# Patient Record
Sex: MALE | Race: WHITE | Employment: OTHER | URBAN - METROPOLITAN AREA
[De-identification: names, ages, dates, MRNs, and addresses within clinical notes are randomized per-mention and may not be internally consistent; named-entity substitution may affect disease eponyms.]

---

## 2017-01-03 ENCOUNTER — TELEPHONE (OUTPATIENT)
Dept: ENDOCRINOLOGY | Age: 82
End: 2017-01-03

## 2017-01-03 NOTE — TELEPHONE ENCOUNTER
----- Message from Evangelina Colón MD sent at 1/3/2017 10:14 AM EST -----  Vitamin D level is normal  Kidney and liver function are normal  Thyroid hormone level relatively unchanged though TSH not yet improved. I recommend he increase his methimazole to 10mg daily for 2 weeks, then return back to using 5mg daily. If methimazole fails to improve his thyroid function, we may trial a short course of prednisone later to try and reduce any inflammation in the thyroid gland.  ----------  I called and spoke with Hanna Figueredo. I relayed the above message, He understood the information. He did ask, did you want to do follow up blood work after the 2 week trial of the increased Methimazole. I told him I would ask you and get back to him. Kermitt Sink   . ................... That wont be necessary, this will just be a boost dose for 2 weeks and we will check his blood work on his one month f/u visit. It is unclear to me however on the schedule it appears he has an appointment for 1 week and again for 1 month with me. Should be one month. The  will need to fix that one I think.      Thanks,  es

## 2017-02-13 ENCOUNTER — TELEPHONE (OUTPATIENT)
Dept: FAMILY MEDICINE CLINIC | Age: 82
End: 2017-02-13

## 2017-02-13 NOTE — TELEPHONE ENCOUNTER
Patient c/o chest pain in left side on and off for 1 week ,denies it hurts when moving or taking a deep breath. advised patient to be evaluated  In the nearest ER or call 911

## 2017-02-27 ENCOUNTER — OFFICE VISIT (OUTPATIENT)
Dept: ENDOCRINOLOGY | Age: 82
End: 2017-02-27

## 2017-02-27 VITALS
SYSTOLIC BLOOD PRESSURE: 149 MMHG | HEIGHT: 68 IN | HEART RATE: 76 BPM | WEIGHT: 175.2 LBS | DIASTOLIC BLOOD PRESSURE: 85 MMHG | BODY MASS INDEX: 26.55 KG/M2

## 2017-02-27 DIAGNOSIS — E05.90 SUBCLINICAL HYPERTHYROIDISM: Primary | ICD-10-CM

## 2017-02-27 DIAGNOSIS — E04.1 THYROID CYST: ICD-10-CM

## 2017-02-27 NOTE — PROGRESS NOTES
CHIEF COMPLAINT: f/u evaluation for hyperthyroidism. HISTORY OF PRESENT ILLNESS:   Annalee Thorpe is a [de-identified] y.o. male with a PMHx as noted below who presents to the endocrinology clinic for f/u evaluation of hyperthyroidism. Patient previously presented with labs suggestive of subclinical hyperthyroidism. Thyroid US did not reveal any nodules, just small cysts, but gland was enlarged at 6cm. Uptake and Scan was normal  Patient was trialled on low dose (5mg) of methimazole and then retrialled with 10mg for 2 weeks before returning back to 5mg. Today he notes again that he feels fine. He was at the ED a few weeks at the [facility] evaluated for chest discomfort with a negative work up. Work up included labs, EKG, and CT of the chest.   Denies symptoms of hyperthyroidism. No recent labs to review, however he did take his methimazole as directed. PAST MEDICAL/SURGICAL HISTORY:   Past Medical History:   Diagnosis Date    Acute gouty arthropathy     Benign hypertensive heart disease without heart failure     Hypertrophy of prostate without urinary obstruction and other lower urinary tract symptoms (LUTS)     Memory difficulties     Other and unspecified hyperlipidemia      Past Surgical History:   Procedure Laterality Date    HX CATARACT REMOVAL  01/01/2008    Rt    HX COLONOSCOPY  7/15    [place], q 10 yrs    HX HERNIA REPAIR  01/01/1980       ALLERGIES:   No Known Allergies    MEDICATIONS ON ADMISSION:     Current Outpatient Prescriptions:     methIMAzole (TAPAZOLE) 5 mg tablet, 5mg (1 tablet) daily  Indications: hyperthyroidism, Disp: 90 Tab, Rfl: 3    OTHER,NON-FORMULARY,, Eye vitamin one tab daily, Disp: , Rfl:     valsartan-hydroCHLOROthiazide (DIOVAN-HCT) 160-12.5 mg per tablet, Take 1 Tab by mouth daily. , Disp: , Rfl:     cholecalciferol, VITAMIN D3, (VITAMIN D3) 5,000 unit tab tablet, Take  by mouth daily. , Disp: , Rfl:     aspirin delayed-release 81 mg tablet, Take  by mouth daily. , Disp: , Rfl:     B.infantis-B.ani-B.long-B.bifi 10-15 mg TbEC, Take  by mouth two (2) times a day., Disp: , Rfl:     amLODIPine (NORVASC) 10 mg tablet, Take  by mouth daily. , Disp: , Rfl:     atorvastatin (LIPITOR) 10 mg tablet, Take  by mouth daily. , Disp: , Rfl:     tamsulosin (FLOMAX) 0.4 mg capsule, Take 0.4 mg by mouth daily. , Disp: , Rfl:     valsartan (DIOVAN) 160 mg tablet, Take  by mouth daily. , Disp: , Rfl:     cholestyramine (QUESTRAN) 4 gram packet, MIX THE CONTENTS OF 1 PACKET IN A LIQUID AND TAKE BY MOUTH TWO (2) TIMES DAILY (WITH MEALS). , Disp: 61 Packet, Rfl: 3    SOCIAL HISTORY:   Social History     Social History    Marital status:      Spouse name: N/A    Number of children: N/A    Years of education: N/A     Occupational History    Not on file. Social History Main Topics    Smoking status: Never Smoker    Smokeless tobacco: Not on file    Alcohol use 1.0 oz/week     2 Standard drinks or equivalent per week      Comment: social    Drug use: Not on file    Sexual activity: Not on file     Other Topics Concern    Not on file     Social History Narrative       FAMILY HISTORY:  Family History   Problem Relation Age of Onset    Hypertension Mother     Stroke Mother     Hypertension Father     Stroke Father     Breast Cancer Sister        REVIEW OF SYSTEMS: Complete ROS assessed and noted for that which is described above, all else are negative.   Eyes: normal  ENT: normal  CVS: normal  Resp: normal  GI: normal  : normal  GYN: normal  Endocrine: normal  Integument: normal  Musculoskeletal: normal  Neuro: normal  Psych: normal      PHYSICAL EXAMINATION:    VITAL SIGNS:  Visit Vitals    /82 (BP 1 Location: Left arm, BP Patient Position: Sitting)    Pulse 80    Ht 5' 8\" (1.727 m)    Wt 175 lb 3.2 oz (79.5 kg)    BMI 26.64 kg/m2       GENERAL: NCAT, Sitting comfortably, NAD  EYES: EOMI, non-icteric, no proptosis  Ear/Nose/Throat: NCAT, no inflammation, thyroid gland enlarged  LYMPH NODES: No LAD  CARDIOVASCULAR: S1 S2, RRR, No murmur, 2+ radial pulses  RESPIRATORY: CTA b/l, no wheeze/rales  GASTROINTESTINAL: soft, NT, ND,  MUSCULOSKELETAL: Normal ROM, no atrophy  SKIN: warm, no edema/rash/ or other skin changes  NEUROLOGIC: 5/5 power all extremities, no tremors, AAOx3  PSYCHIATRIC: Normal affect, Normal insight and judgement         REVIEW OF LABORATORY AND RADIOLOGY DATA:   Labs and documentation have been reviewed as described above. ASSESSMENT AND PLAN:   Donald Shannon is a [de-identified] y.o. male with a PMHx as noted above who presents to the endocrinology clinic for evaluation of hyperthyroidism. Subclinical hyperthyroidism  Enlarged thyroid gland    Patient had previously not responded to low doses of methimazole. We will recheck thyroid function today, then challenge with higher dose in the range of 20mg - 30mg daily for 6 weeks if not improved. I suspect some underlying pathologic process since his thyroid gland is diffusely enlarged without solid nodules. Labs today, and again in 6 weeks following higher doses if not improved. Goal to reduce risk of atrial fibrillation and osteoporosis due to subclinical hyperthyroidism. Plan to f/u in 3 months, repeat labs in 6 weeks as needed. Provided a lab form to complete close to home if needed. [facility].  [address] Diabetes & Endocrinology

## 2017-02-27 NOTE — PATIENT INSTRUCTIONS
Labs today  Will discuss results  Will consider more aggressive dose of methimazole if needed for few weeks, will discuss results and plan soon.

## 2017-02-27 NOTE — MR AVS SNAPSHOT
Visit Information Date & Time Provider Department Dept. Phone Encounter #  
 2/27/2017  3:50 PM Sandi Hurtadoambrosio [facility] Diabetes and Endocrinology [phone] [id number] Follow-up Instructions Return in about 6 weeks (around 4/10/2017). Upcoming Health Maintenance Date Due DTaP/Tdap/Td series (1 - Tdap) 3/19/1957 ZOSTER VACCINE AGE 60> 3/19/1996 GLAUCOMA SCREENING Q2Y 3/19/2001 Pneumococcal 65+ Low/Medium Risk (1 of 2 - PCV13) 3/19/2001 MEDICARE YEARLY EXAM 3/19/2001 INFLUENZA AGE 9 TO ADULT 8/1/2016 Allergies as of 2/27/2017  Review Complete On: 2/27/2017 By: Marilia Alfonso No Known Allergies Current Immunizations  Never Reviewed No immunizations on file. Not reviewed this visit You Were Diagnosed With   
  
 Codes Comments Subclinical hyperthyroidism    -  Primary ICD-10-CM: E05.90 ICD-9-CM: 242.90 Thyroid cyst     ICD-10-CM: E04.1 ICD-9-CM: 246. 2 Vitals BP  
  
  
  
  
  
 149/85 (BP 1 Location: Right arm, BP Patient Position: Sitting) Vitals History BMI and BSA Data Body Mass Index Body Surface Area  
 26.64 kg/m 2 1.95 m 2 Preferred Pharmacy Pharmacy Name Phone [id number] [address] [phone] Your Updated Medication List  
  
   
This list is accurate as of: 2/27/17  4:21 PM.  Always use your most recent med list. amLODIPine 10 mg tablet Commonly known as:  Zhao Perez Take  by mouth daily. aspirin delayed-release 81 mg tablet Take  by mouth daily. B.infantis-B.ani-B.long-B.bifi 10-15 mg Tbec Take  by mouth two (2) times a day. cholecalciferol (VITAMIN D3) 5,000 unit Tab tablet Commonly known as:  VITAMIN D3 Take  by mouth daily. cholestyramine 4 gram packet Commonly known as:  QUESTRAN  
MIX THE CONTENTS OF 1 PACKET IN A LIQUID AND TAKE BY MOUTH TWO (2) TIMES DAILY (WITH MEALS). LIPITOR 10 mg tablet Generic drug:  atorvastatin Take  by mouth daily. methIMAzole 5 mg tablet Commonly known as:  TAPAZOLE  
5mg (1 tablet) daily  Indications: hyperthyroidism OTHER(NON-FORMULARY) Eye vitamin one tab daily  
  
 tamsulosin 0.4 mg capsule Commonly known as:  FLOMAX Take 0.4 mg by mouth daily. valsartan 160 mg tablet Commonly known as:  DIOVAN Take  by mouth daily. valsartan-hydroCHLOROthiazide 160-12.5 mg per tablet Commonly known as:  DIOVAN-HCT Take 1 Tab by mouth daily. We Performed the Following   
 T3 TOTAL [61024 CPT(R)]   
 T3 TOTAL [56311 CPT(R)] T4, FREE T0023709 CPT(R)] T4, FREE [id number] CPT(R)] TSH 3RD GENERATION [54177 CPT(R)] TSH 3RD GENERATION [14040 CPT(R)] Follow-up Instructions Return in about 6 weeks (around 4/10/2017). Patient Instructions Labs today Will discuss results Will consider more aggressive dose of methimazole if needed for few weeks, will discuss results and plan soon. Introducing [facility]! Riky Bauer introduces Physihome patient portal. Now you can access parts of your medical record, email your doctor's office, and request medication refills online. 1. In your internet browser, go to [email or web address] 2. Click on the First Time User? Click Here link in the Sign In box. You will see the New Member Sign Up page. 3. Enter your Physihome Access Code exactly as it appears below. You will not need to use this code after youve completed the sign-up process. If you do not sign up before the expiration date, you must request a new code. · Physihome Access Code: [email or web address] Expires: 5/28/2017  4:21 PM 
 
4. Enter the last four digits of your Social Security Number (xxxx) and Date of Birth (mm/dd/yyyy) as indicated and click Submit. You will be taken to the next sign-up page. 5. Create a Vuzit ID. This will be your Vuzit login ID and cannot be changed, so think of one that is secure and easy to remember. 6. Create a Vuzit password. You can change your password at any time. 7. Enter your Password Reset Question and Answer. This can be used at a later time if you forget your password. 8. Enter your e-mail address. You will receive e-mail notification when new information is available in [address] 9. Click Sign Up. You can now view and download portions of your medical record. 10. Click the Download Summary menu link to download a portable copy of your medical information. If you have questions, please visit the Frequently Asked Questions section of the Vuzit website. Remember, Vuzit is NOT to be used for urgent needs. For medical emergencies, dial 911. Now available from your iPhone and Android! Please provide this summary of care documentation to your next provider. Your primary care clinician is listed as [facility]. If you have any questions after today's visit, please call [phone].

## 2017-02-28 ENCOUNTER — TELEPHONE (OUTPATIENT)
Dept: ENDOCRINOLOGY | Age: 82
End: 2017-02-28

## 2017-02-28 LAB
T3 SERPL-MCNC: 110 NG/DL (ref 71–180)
T4 FREE SERPL-MCNC: 0.9 NG/DL (ref 0.82–1.77)
TSH SERPL DL<=0.005 MIU/L-ACNC: 1.9 UIU/ML (ref 0.45–4.5)

## 2017-02-28 NOTE — TELEPHONE ENCOUNTER
----- Message from Deangelo Freitas MD sent at 2/28/2017  9:00 AM EST -----  TSH is up to 1.9 from the prior 0.92. Continues on 5mg of methimazole. My recommendation is to continue on 5mg of methimazole each day,  No need to adjust dose at this time as he has started to respond to therapy,  Recommend he still do the 6 week TSH near home and we will review it to make sure it is stable. Will see him on his 3 month f/u, to call with any concerns between now and then,    Amado Gray.  [facility]  [address] [place] [address]    -------------------------------------------    2/28/2017, 2:12 PM    Attempted to call Pat Childress, reached voice mail. I left a message to return my call. Manuel Barker     -------------    Addendum: 2/28/2017, 3:39 PM  Spoke with Pat Childress by phone and relayed the above message. He understood the information.       Manuel Barker

## 2017-02-28 NOTE — PROGRESS NOTES
TSH is up to 1.9 from the prior 0.92. Continues on 5mg of methimazole. My recommendation is to continue on 5mg of methimazole each day,  No need to adjust dose at this time as he has started to respond to therapy,  Recommend he still do the 6 week TSH near home and we will review it to make sure it is stable. Will see him on his 3 month f/u, to call with any concerns between now and then,    Lauren Wing.  [address] [facility] Endocrinology  [address] [place] [address]

## 2017-05-26 ENCOUNTER — OFFICE VISIT (OUTPATIENT)
Dept: ENDOCRINOLOGY | Age: 82
End: 2017-05-26

## 2017-05-26 VITALS
SYSTOLIC BLOOD PRESSURE: 137 MMHG | DIASTOLIC BLOOD PRESSURE: 77 MMHG | BODY MASS INDEX: 26.51 KG/M2 | HEIGHT: 68 IN | WEIGHT: 174.9 LBS | HEART RATE: 67 BPM

## 2017-05-26 DIAGNOSIS — E05.90 SUBCLINICAL HYPERTHYROIDISM: Primary | ICD-10-CM

## 2017-05-26 DIAGNOSIS — E04.9 GOITER: ICD-10-CM

## 2017-05-26 NOTE — PROGRESS NOTES
CHIEF COMPLAINT: f/u evaluation for hyperthyroidism. HISTORY OF PRESENT ILLNESS:   Ayana Mathur is a 81 y.o. male with a PMHx as noted below who presents to the endocrinology clinic for f/u evaluation of hyperthyroidism. Patient previously presented with labs suggestive of subclinical hyperthyroidism. Thyroid US did not reveal any nodules, just small cysts, but gland was enlarged at 6cm. Uptake and Scan was normal  Patient was trialled on low dose (5mg) of methimazole and eventually had a good response to therapy. Today the patient presents feeling well,  Has been taking the 5mg regularly, not missing doses,  Plans to have a vascular screen offered by a company in town today. Denies symptoms of hyperthyroidism, no chest discomfort, family is doing fine.    No recent blood work between visits as expected but will check today,  Review of thyroid labs:  Review of most recent thyroid function:  Lab Results   Component Value Date    TSH 1.49 04/06/2017    TSH 1.900 02/27/2017    TSH 0.092 (L) 12/30/2016    FT4 0.6 04/06/2017    FT4 0.90 02/27/2017    FT4 1.21 12/30/2016    T3LT 105 04/06/2017    T3LT 110 02/27/2017    T3LT 126 11/03/2016    TSILT 25 06/14/2016      TSILT = Thyroid stimulating antibodies  TMCLT = TPO antibodies  T3LT = Total T3 levels        PAST MEDICAL/SURGICAL HISTORY:   Past Medical History:   Diagnosis Date    Acute gouty arthropathy     Benign hypertensive heart disease without heart failure     Hypertrophy of prostate without urinary obstruction and other lower urinary tract symptoms (LUTS)     Memory difficulties     Other and unspecified hyperlipidemia      Past Surgical History:   Procedure Laterality Date    HX CATARACT REMOVAL  01/01/2008    Rt    HX COLONOSCOPY  7/15    [place], q 10 yrs    HX HERNIA REPAIR  01/01/1980       ALLERGIES:   No Known Allergies    MEDICATIONS ON ADMISSION:     Current Outpatient Prescriptions:     methIMAzole (TAPAZOLE) 5 mg tablet, 5mg (1 tablet) daily  Indications: hyperthyroidism, Disp: 90 Tab, Rfl: 3    OTHER,NON-FORMULARY,, Eye vitamin one tab daily, Disp: , Rfl:     valsartan-hydroCHLOROthiazide (DIOVAN-HCT) 160-12.5 mg per tablet, Take 1 Tab by mouth daily. , Disp: , Rfl:     cholecalciferol, VITAMIN D3, (VITAMIN D3) 5,000 unit tab tablet, Take  by mouth daily. , Disp: , Rfl:     aspirin delayed-release 81 mg tablet, Take  by mouth daily. , Disp: , Rfl:     B.infantis-B.ani-B.long-B.bifi 10-15 mg TbEC, Take  by mouth two (2) times a day., Disp: , Rfl:     amLODIPine (NORVASC) 10 mg tablet, Take  by mouth daily. , Disp: , Rfl:     atorvastatin (LIPITOR) 10 mg tablet, Take  by mouth daily. , Disp: , Rfl:     tamsulosin (FLOMAX) 0.4 mg capsule, Take 0.4 mg by mouth daily. , Disp: , Rfl:     valsartan (DIOVAN) 160 mg tablet, Take  by mouth daily. , Disp: , Rfl:     cholestyramine (QUESTRAN) 4 gram packet, MIX THE CONTENTS OF 1 PACKET IN A LIQUID AND TAKE BY MOUTH TWO (2) TIMES DAILY (WITH MEALS). , Disp: 61 Packet, Rfl: 3    SOCIAL HISTORY:   Social History     Social History    Marital status:      Spouse name: N/A    Number of children: N/A    Years of education: N/A     Occupational History    Not on file. Social History Main Topics    Smoking status: Never Smoker    Smokeless tobacco: Not on file    Alcohol use 1.0 oz/week     2 Standard drinks or equivalent per week      Comment: social    Drug use: Not on file    Sexual activity: Not on file     Other Topics Concern    Not on file     Social History Narrative       FAMILY HISTORY:  Family History   Problem Relation Age of Onset    Hypertension Mother     Stroke Mother     Hypertension Father     Stroke Father     Breast Cancer Sister        REVIEW OF SYSTEMS: Complete ROS assessed and noted for that which is described above, all else are negative.   Eyes: normal  ENT: normal  CVS: normal  Resp: normal  GI: normal  : normal  GYN: normal  Endocrine: normal  Integument: normal  Musculoskeletal: normal  Neuro: normal  Psych: normal      PHYSICAL EXAMINATION:    VITAL SIGNS:  Visit Vitals    /77 (BP 1 Location: Right arm, BP Patient Position: Sitting)    Pulse 67    Ht 5' 8\" (1.727 m)    Wt 174 lb 14.4 oz (79.3 kg)    BMI 26.59 kg/m2       GENERAL: NCAT, Sitting comfortably, NAD  EYES: EOMI, non-icteric, no proptosis  Ear/Nose/Throat: NCAT, no inflammation, thyroid gland sightly enlarged but not as before  LYMPH NODES: No LAD  CARDIOVASCULAR: S1 S2, RRR, No murmur, 2+ radial pulses  RESPIRATORY: CTA b/l, no wheeze/rales  GASTROINTESTINAL: soft, NT, ND,  MUSCULOSKELETAL: Normal ROM, no atrophy  SKIN: warm, no edema/rash/ or other skin changes  NEUROLOGIC: 5/5 power all extremities, no tremors, AAOx3  PSYCHIATRIC: Normal affect, Normal insight and judgement         REVIEW OF LABORATORY AND RADIOLOGY DATA:   Labs and documentation have been reviewed as described above. ASSESSMENT AND PLAN:   Jolene Foss is a 81 y.o. male with a PMHx as noted above who presents to the endocrinology clinic for evaluation of hyperthyroidism. Subclinical hyperthyroidism  Goiter    Responding well to methimazole at 5mg daily  Labs today, to include cmp and cbc for monitoring parameters  Goal to reduce risk of atrial fibrillation and osteoporosis due to subclinical hyperthyroidism. Thyroid goiter not completely appreciated on exam, can repeat US in 2019 as long as clinical exam is ok    Plan to f/u in 4 months, call with concerns    Heidi Elias.  [facility]

## 2017-05-26 NOTE — MR AVS SNAPSHOT
Visit Information Date & Time Provider Department Dept. Phone Encounter #  
 5/26/2017 10:50 AM Eric Nguyen, [address] [place] [address] Diabetes and Endocrinology [phone] [id number] Follow-up Instructions Return in about 4 months (around 9/26/2017). Upcoming Health Maintenance Date Due DTaP/Tdap/Td series (1 - Tdap) 3/19/1957 ZOSTER VACCINE AGE 60> 3/19/1996 GLAUCOMA SCREENING Q2Y 3/19/2001 Pneumococcal 65+ Low/Medium Risk (1 of 2 - PCV13) 3/19/2001 MEDICARE YEARLY EXAM 3/19/2001 INFLUENZA AGE 9 TO ADULT 8/1/2017 Allergies as of 5/26/2017  Review Complete On: 5/26/2017 By: Eric Nguyen MD  
 No Known Allergies Current Immunizations  Never Reviewed No immunizations on file. Not reviewed this visit You Were Diagnosed With   
  
 Codes Comments Subclinical hyperthyroidism    -  Primary ICD-10-CM: E05.90 ICD-9-CM: 242.90 Goiter     ICD-10-CM: E04.9 ICD-9-CM: 240.9 Vitals BP Pulse Height(growth percentile) Weight(growth percentile) BMI Smoking Status 137/77 (BP 1 Location: Right arm, BP Patient Position: Sitting) 67 5' 8\" (1.727 m) 174 lb 14.4 oz (79.3 kg) 26.59 kg/m2 Never Smoker Vitals History BMI and BSA Data Body Mass Index Body Surface Area  
 26.59 kg/m 2 1.95 m 2 Preferred Pharmacy Pharmacy Name Phone [id number] [address] [phone] Your Updated Medication List  
  
   
This list is accurate as of: 5/26/17 11:03 AM.  Always use your most recent med list. amLODIPine 10 mg tablet Commonly known as:  Herlene Pupa Take  by mouth daily. aspirin delayed-release 81 mg tablet Take  by mouth daily. B.infantis-B.ani-B.long-B.bifi 10-15 mg Tbec Take  by mouth two (2) times a day. cholecalciferol (VITAMIN D3) 5,000 unit Tab tablet Commonly known as:  VITAMIN D3 Take  by mouth daily. cholestyramine 4 gram packet Commonly known as:  QUESTRAN  
MIX THE CONTENTS OF 1 PACKET IN A LIQUID AND TAKE BY MOUTH TWO (2) TIMES DAILY (WITH MEALS). LIPITOR 10 mg tablet Generic drug:  atorvastatin Take  by mouth daily. methIMAzole 5 mg tablet Commonly known as:  TAPAZOLE  
5mg (1 tablet) daily  Indications: hyperthyroidism OTHER(NON-FORMULARY) Eye vitamin one tab daily  
  
 tamsulosin 0.4 mg capsule Commonly known as:  FLOMAX Take 0.4 mg by mouth daily. valsartan 160 mg tablet Commonly known as:  DIOVAN Take  by mouth daily. valsartan-hydroCHLOROthiazide 160-12.5 mg per tablet Commonly known as:  DIOVAN-HCT Take 1 Tab by mouth daily. We Performed the Following CBC WITH AUTOMATED DIFF [72634 CPT(R)] METABOLIC PANEL, COMPREHENSIVE [46478 CPT(R)]   
 T3 TOTAL [07053 CPT(R)] T4, FREE [id number] CPT(R)] TSH 3RD GENERATION [51671 CPT(R)] Follow-up Instructions Return in about 4 months (around 9/26/2017). Patient Instructions 5mg of methimazole daily Blood work today Plan for a 4 month follow up, Inocente Moe. [address] [facility] Endocrinology 8 [facility]! [facility] introduces [employer] patient portal. Now you can access parts of your medical record, email your doctor's office, and request medication refills online. 1. In your internet browser, go to [email or web address] 2. Click on the First Time User? Click Here link in the Sign In box. You will see the New Member Sign Up page. 3. Enter your [employer] Access Code exactly as it appears below. You will not need to use this code after youve completed the sign-up process. If you do not sign up before the expiration date, you must request a new code. · [employer] Access Code: [email or web address] Expires: 5/28/2017  5:21 PM 
 
 4. Enter the last four digits of your Social Security Number (xxxx) and Date of Birth (mm/dd/yyyy) as indicated and click Submit. You will be taken to the next sign-up page. 5. Create a Kanobu Network ID. This will be your [employer] login ID and cannot be changed, so think of one that is secure and easy to remember. 6. Create a [employer] Network password. You can change your password at any time. 7. Enter your Password Reset Question and Answer. This can be used at a later time if you forget your password. 8. Enter your e-mail address. You will receive e-mail notification when new information is available in [address] 9. Click Sign Up. You can now view and download portions of your medical record. 10. Click the Download Summary menu link to download a portable copy of your medical information. If you have questions, please visit the Frequently Asked Questions section of the [employer] website. Remember, [employer] is NOT to be used for urgent needs. For medical emergencies, dial 911. Now available from your iPhone and Android! Please provide this summary of care documentation to your next provider. Your primary care clinician is listed as Vinya Portillo. If you have any questions after today's visit, please call [phone].

## 2017-05-26 NOTE — PATIENT INSTRUCTIONS
5mg of methimazole daily  Blood work today  Plan for a 4 month follow up,    Mya Marcial.  [facility] Endocr[facility]ology  8 [facility]

## 2017-05-27 LAB
ALBUMIN SERPL-MCNC: 3.9 G/DL (ref 3.5–4.7)
ALBUMIN/GLOB SERPL: 1.4 {RATIO} (ref 1.2–2.2)
ALP SERPL-CCNC: 85 IU/L (ref 39–117)
ALT SERPL-CCNC: 18 IU/L (ref 0–44)
AST SERPL-CCNC: 26 IU/L (ref 0–40)
BASOPHILS # BLD AUTO: 0 X10E3/UL (ref 0–0.2)
BASOPHILS NFR BLD AUTO: 1 %
BILIRUB SERPL-MCNC: 0.4 MG/DL (ref 0–1.2)
BUN SERPL-MCNC: 23 MG/DL (ref 8–27)
BUN/CREAT SERPL: 18 (ref 10–24)
CALCIUM SERPL-MCNC: 9.3 MG/DL (ref 8.6–10.2)
CHLORIDE SERPL-SCNC: 104 MMOL/L (ref 96–106)
CO2 SERPL-SCNC: 26 MMOL/L (ref 18–29)
CREAT SERPL-MCNC: 1.3 MG/DL (ref 0.76–1.27)
EOSINOPHIL # BLD AUTO: 0.1 X10E3/UL (ref 0–0.4)
EOSINOPHIL NFR BLD AUTO: 3 %
ERYTHROCYTE [DISTWIDTH] IN BLOOD BY AUTOMATED COUNT: 14.4 % (ref 12.3–15.4)
GLOBULIN SER CALC-MCNC: 2.7 G/DL (ref 1.5–4.5)
GLUCOSE SERPL-MCNC: 85 MG/DL (ref 65–99)
HCT VFR BLD AUTO: 40.8 % (ref 37.5–51)
HGB BLD-MCNC: 13.6 G/DL (ref 12.6–17.7)
IMM GRANULOCYTES # BLD: 0 X10E3/UL (ref 0–0.1)
IMM GRANULOCYTES NFR BLD: 1 %
INTERPRETATION: NORMAL
LYMPHOCYTES # BLD AUTO: 1.2 X10E3/UL (ref 0.7–3.1)
LYMPHOCYTES NFR BLD AUTO: 33 %
MCH RBC QN AUTO: 31.3 PG (ref 26.6–33)
MCHC RBC AUTO-ENTMCNC: 33.3 G/DL (ref 31.5–35.7)
MCV RBC AUTO: 94 FL (ref 79–97)
MONOCYTES # BLD AUTO: 0.4 X10E3/UL (ref 0.1–0.9)
MONOCYTES NFR BLD AUTO: 12 %
NEUTROPHILS # BLD AUTO: 1.8 X10E3/UL (ref 1.4–7)
NEUTROPHILS NFR BLD AUTO: 50 %
PLATELET # BLD AUTO: 200 X10E3/UL (ref 150–379)
POTASSIUM SERPL-SCNC: 4.6 MMOL/L (ref 3.5–5.2)
PROT SERPL-MCNC: 6.6 G/DL (ref 6–8.5)
RBC # BLD AUTO: 4.35 X10E6/UL (ref 4.14–5.8)
SODIUM SERPL-SCNC: 145 MMOL/L (ref 134–144)
T3 SERPL-MCNC: 117 NG/DL (ref 71–180)
T4 FREE SERPL-MCNC: 0.89 NG/DL (ref 0.82–1.77)
TSH SERPL DL<=0.005 MIU/L-ACNC: 2.46 UIU/ML (ref 0.45–4.5)
WBC # BLD AUTO: 3.6 X10E3/UL (ref 3.4–10.8)

## 2017-05-30 NOTE — PROGRESS NOTES
Thyroid function normal  Cr and sodium up suggesting dehydration,  Sent letter to home with results and description,    Peggy Hernández.  39 [facility] Endocr[facility]ology  8 [facility]

## 2017-09-18 ENCOUNTER — DOCUMENTATION ONLY (OUTPATIENT)
Dept: ENDOCRINOLOGY | Age: 82
End: 2017-09-18

## 2017-09-18 NOTE — PROGRESS NOTES
Received outside labs from Dr. Londell Cabot, Internal Medicine, dated 9/7/18:   TSH normal at 2.39  FT4 normal at 1.0  FT3 normal at 3.1    Appears to be stable on methimazole,    [place] MEI.  [address] [facility] Endocrinology  [address] [place] [address]

## 2017-09-26 ENCOUNTER — OFFICE VISIT (OUTPATIENT)
Dept: ENDOCRINOLOGY | Age: 82
End: 2017-09-26

## 2017-09-26 VITALS
HEART RATE: 80 BPM | DIASTOLIC BLOOD PRESSURE: 80 MMHG | BODY MASS INDEX: 26.9 KG/M2 | WEIGHT: 177.5 LBS | SYSTOLIC BLOOD PRESSURE: 160 MMHG | HEIGHT: 68 IN

## 2017-09-26 DIAGNOSIS — E05.90 SUBCLINICAL HYPERTHYROIDISM: Primary | ICD-10-CM

## 2017-09-26 DIAGNOSIS — E04.9 GOITER: ICD-10-CM

## 2017-09-26 RX ORDER — IRBESARTAN 300 MG/1
TABLET ORAL
Refills: 3 | COMMUNITY
Start: 2017-09-11

## 2017-09-26 NOTE — PROGRESS NOTES
CHIEF COMPLAINT: f/u evaluation for hyperthyroidism. HISTORY OF PRESENT ILLNESS:   Thuy Cramer is a 81 y.o. male with a PMHx as noted below who presents to the endocrinology clinic for f/u evaluation of hyperthyroidism. Patient previously presented with labs suggestive of subclinical hyperthyroidism. Thyroid US did not reveal any nodules, just small cysts, but gland was enlarged at 6cm. Uptake and Scan was normal  Patient was trialled on low dose (5mg) of methimazole and eventually had a good response to therapy. Interval history: Today the patient presents feeling well,  Has been taking the 5mg regularly, not missing doses,  Received outside labs from Dr. Reg Krishnamurthy, Internal Medicine, dated 9/7/18:   TSH normal at 2.39  FT4 normal at 1.0  FT3 normal at 3.1  Patient has card from clinic noting normal \"liver test\" and \"CBC\" also, which is reassuring.    Notes he will be seeing neurology for evaluation of gait/tremor etc.     Review of thyroid labs:  Review of most recent thyroid function:  Lab Results   Component Value Date    TSH 2.460 05/26/2017    TSH 1.49 04/06/2017    TSH 1.900 02/27/2017    FT4 0.89 05/26/2017    FT4 0.6 04/06/2017    FT4 0.90 02/27/2017    T3LT 117 05/26/2017    T3LT 105 04/06/2017    T3LT 110 02/27/2017    TSILT 25 06/14/2016      TSILT = Thyroid stimulating antibodies  TMCLT = TPO antibodies  T3LT = Total T3 levels        PAST MEDICAL/SURGICAL HISTORY:   Past Medical History:   Diagnosis Date    Acute gouty arthropathy     Benign hypertensive heart disease without heart failure     Hypertrophy of prostate without urinary obstruction and other lower urinary tract symptoms (LUTS)     Memory difficulties     Other and unspecified hyperlipidemia      Past Surgical History:   Procedure Laterality Date    HX CATARACT REMOVAL  01/01/2008    Rt    HX COLONOSCOPY  7/15    Leatha, lenin 10 yrs    HX HERNIA REPAIR  01/01/1980       ALLERGIES:   No Known Allergies    MEDICATIONS ON ADMISSION:     Current Outpatient Prescriptions:     irbesartan (AVAPRO) 300 mg tablet, TAKE 1 TABLET BY MOUTH EVERY DAY FOR BLOOD PRESSURE, Disp: , Rfl: 3    methIMAzole (TAPAZOLE) 5 mg tablet, 5mg (1 tablet) daily  Indications: hyperthyroidism, Disp: 90 Tab, Rfl: 3    OTHER,NON-FORMULARY,, Eye vitamin one tab daily, Disp: , Rfl:     cholecalciferol, VITAMIN D3, (VITAMIN D3) 5,000 unit tab tablet, Take  by mouth daily. , Disp: , Rfl:     aspirin delayed-release 81 mg tablet, Take  by mouth daily. , Disp: , Rfl:     B.infantis-B.ani-B.long-B.bifi 10-15 mg TbEC, Take  by mouth two (2) times a day., Disp: , Rfl:     amLODIPine (NORVASC) 10 mg tablet, Take  by mouth daily. , Disp: , Rfl:     atorvastatin (LIPITOR) 10 mg tablet, Take  by mouth daily. , Disp: , Rfl:     tamsulosin (FLOMAX) 0.4 mg capsule, Take 0.4 mg by mouth daily. , Disp: , Rfl:     cholestyramine (QUESTRAN) 4 gram packet, MIX THE CONTENTS OF 1 PACKET IN A LIQUID AND TAKE BY MOUTH TWO (2) TIMES DAILY (WITH MEALS). , Disp: 60 Packet, Rfl: 3    valsartan-hydroCHLOROthiazide (DIOVAN-HCT) 160-12.5 mg per tablet, Take 1 Tab by mouth daily. , Disp: , Rfl:     valsartan (DIOVAN) 160 mg tablet, Take  by mouth daily. , Disp: , Rfl:     SOCIAL HISTORY:   Social History     Social History    Marital status:      Spouse name: N/A    Number of children: N/A    Years of education: N/A     Occupational History    Not on file.      Social History Main Topics    Smoking status: Never Smoker    Smokeless tobacco: Not on file    Alcohol use 1.0 oz/week     2 Standard drinks or equivalent per week      Comment: social    Drug use: Not on file    Sexual activity: Not on file     Other Topics Concern    Not on file     Social History Narrative       FAMILY HISTORY:  Family History   Problem Relation Age of Onset    Hypertension Mother     Stroke Mother     Hypertension Father     Stroke Father     Breast Cancer Sister REVIEW OF SYSTEMS: Complete ROS assessed and noted for that which is described above, all else are negative. Eyes: normal  ENT: normal  CVS: normal  Resp: normal  GI: normal  : normal  GYN: normal  Endocrine: normal  Integument: normal  Musculoskeletal: normal  Neuro: normal  Psych: normal      PHYSICAL EXAMINATION:    VITAL SIGNS:  Visit Vitals    /80 (BP 1 Location: Right arm, BP Patient Position: Sitting)    Pulse 80    Ht 5' 8\" (1.727 m)    Wt 177 lb 8 oz (80.5 kg)    BMI 26.99 kg/m2       GENERAL: NCAT, Sitting comfortably, NAD  EYES: EOMI, non-icteric, no proptosis  Ear/Nose/Throat: NCAT, no inflammation, thyroid gland sightly enlarged but not as before  LYMPH NODES: No LAD  CARDIOVASCULAR: S1 S2, RRR, No murmur, 2+ radial pulses  RESPIRATORY: CTA b/l, no wheeze/rales  GASTROINTESTINAL: soft, NT, ND,  MUSCULOSKELETAL: Normal ROM, no atrophy  SKIN: warm, no edema/rash/ or other skin changes  NEUROLOGIC: 5/5 power all extremities, no tremors, AAOx3  PSYCHIATRIC: Normal affect, Normal insight and judgement         REVIEW OF LABORATORY AND RADIOLOGY DATA:   Labs and documentation have been reviewed as described above. ASSESSMENT AND PLAN:   Boubacar Bravo is a 81 y.o. male with a PMHx as noted above who presents to the endocrinology clinic for evaluation of hyperthyroidism. Subclinical hyperthyroidism  Goiter    Doing well on methimazole 5mg daily  We will continue this for now,  Labs are stable,   I suspect that on next visit we will start a lower dose or trial off. I suspect that his condition, considering negative work up but diffusely large goiter, that autoimmune disease could still be the cause of his underlying subclinical hyperthyroidism. Plan to f/u in 4 months, pre-labs,    Leanna LAN  [address] [facility] & Endocrinology

## 2017-09-26 NOTE — MR AVS SNAPSHOT
Visit Information Date & Time Provider Department Dept. Phone Encounter #  
 9/26/2017 10:50 AM Mounika Jackson, [address] [facility] Diabetes and Endocrinology [phone] Follow-up Instructions Return in about 4 months (around 1/26/2018). Upcoming Health Maintenance Date Due DTaP/Tdap/Td series (1 - Tdap) 3/19/1957 ZOSTER VACCINE AGE 60> 1/19/1996 GLAUCOMA SCREENING Q2Y 3/19/2001 Pneumococcal 65+ Low/Medium Risk (1 of 2 - PCV13) 3/19/2001 MEDICARE YEARLY EXAM 3/19/2001 INFLUENZA AGE 9 TO ADULT 8/1/2017 Allergies as of 9/26/2017  Review Complete On: 9/26/2017 By: Mounika Jackson MD  
 No Known Allergies Current Immunizations  Never Reviewed No immunizations on file. Not reviewed this visit You Were Diagnosed With   
  
 Codes Comments Subclinical hyperthyroidism    -  Primary ICD-10-CM: E05.90 ICD-9-CM: 242.90 Goiter     ICD-10-CM: E04.9 ICD-9-CM: 240.9 Vitals BP Pulse Height(growth percentile) Weight(growth percentile) BMI Smoking Status 160/80 (BP 1 Location: Right arm, BP Patient Position: Sitting) 80 5' 8\" (1.727 m) 177 lb 8 oz (80.5 kg) 26.99 kg/m2 Never Smoker Vitals History BMI and BSA Data Body Mass Index Body Surface Area  
 26.99 kg/m 2 1.97 m 2 Preferred Pharmacy Pharm[facility] Name Phone [id number] [address] [phone] Your Updated Medication List  
  
   
This list is accurate as of: 9/26/17 11:52 AM.  Always use your most recent med list. amLODIPine 10 mg tablet Commonly known as:  Harlolindsay Cansecoyne Take  by mouth daily. aspirin delayed-release 81 mg tablet Take  by mouth daily. B.infantis-B.ani-B.long-B.bifi 10-15 mg Tbec Take  by mouth two (2) times a day. cholecalciferol (VITAMIN D3) 5,000 unit Tab tablet Commonly known as:  VITAMIN D3 Take  by mouth daily. cholestyramine 4 gram packet Commonly known as:  QUESTRAN  
MIX THE CONTENTS OF 1 PACKET IN A LIQUID AND TAKE BY MOUTH TWO (2) TIMES DAILY (WITH MEALS). irbesartan 300 mg tablet Commonly known as:  AVAPRO TAKE 1 TABLET BY MOUTH EVERY DAY FOR BLOOD PRESSURE  
  
 LIPITOR 10 mg tablet Generic drug:  atorvastatin Take  by mouth daily. methIMAzole 5 mg tablet Commonly known as:  TAPAZOLE  
5mg (1 tablet) daily  Indications: hyperthyroidism OTHER(NON-FORMULARY) Eye vitamin one tab daily  
  
 tamsulosin 0.4 mg capsule Commonly known as:  FLOMAX Take 0.4 mg by mouth daily. valsartan 160 mg tablet Commonly known as:  DIOVAN Take  by mouth daily. valsartan-hydroCHLOROthiazide 160-12.5 mg per tablet Commonly known as:  DIOVAN-HCT Take 1 Tab by mouth daily. We Performed the Following T4, FREE W799139 CPT(R)] TSH 3RD GENERATION [20636 CPT(R)] Follow-up Instructions Return in about 4 months (around 1/26/2018). Introducing [facility]! Amy Mullen introduces [employer] patient portal. Now you can access parts of your medical record, email your doctor's office, and request medication refills online. 1. In your internet browser, go to [email or web address] 2. Click on the First Time User? Click Here link in the Sign In box. You will see the New Member Sign Up page. 3. Enter your [employer] Access Code exactly as it appears below. You will not need to use this code after youve completed the sign-up process. If you do not sign up before the expiration date, you must request a new code. · [employer] Access Code: [email or web address] Expires: 12/25/2017 11:51 AM 
 
4. Enter the last four digits of your Social Security Number (xxxx) and Date of Birth (mm/dd/yyyy) as indicated and click Submit. You will be taken to the next sign-up page. 5. Create a [employer] ID.  This will be your [employer] login ID and cannot be changed, so think of one that is secure and easy to remember. 6. Create a [employer]com password. You can change your password at any time. 7. Enter your Password Reset Question and Answer. This can be used at a later time if you forget your password. 8. Enter your e-mail address. You will receive e-mail notification when new information is available in [address] 9. Click Sign Up. You can now view and download portions of your medical record. 10. Click the Download Summary menu link to download a portable copy of your medical information. If you have questions, please visit the Frequently Asked Questions section of the [employer] website. Remember, [employer] is NOT to be used for urgent needs. For medical emergencies, dial 911. Now available from your iPhone and Android! Please provide this summary of care documentation to your next provider. Your primary care clinician is listed as Leta Burgess. If you have any questions after today's visit, please call [phone].

## 2017-11-06 RX ORDER — METHIMAZOLE 5 MG/1
TABLET ORAL
Qty: 90 TAB | Refills: 3 | Status: SHIPPED | OUTPATIENT
Start: 2017-11-06 | End: 2018-11-08 | Stop reason: SDUPTHER

## 2018-01-24 ENCOUNTER — OFFICE VISIT (OUTPATIENT)
Dept: ENDOCRINOLOGY | Age: 83
End: 2018-01-24

## 2018-01-24 VITALS
WEIGHT: 171.2 LBS | HEART RATE: 80 BPM | SYSTOLIC BLOOD PRESSURE: 112 MMHG | HEIGHT: 68 IN | BODY MASS INDEX: 25.94 KG/M2 | DIASTOLIC BLOOD PRESSURE: 65 MMHG

## 2018-01-24 DIAGNOSIS — E05.90 SUBCLINICAL HYPERTHYROIDISM: Primary | ICD-10-CM

## 2018-01-24 RX ORDER — HYDROCHLOROTHIAZIDE 12.5 MG/1
TABLET ORAL
Refills: 1 | COMMUNITY
Start: 2018-01-15 | End: 2019-04-18 | Stop reason: ALTCHOICE

## 2018-01-24 NOTE — MR AVS SNAPSHOT
[address] [facility] [address] [address] [phone] Patient: Jazmine Umana 
MRN: [id number] RCF:[id number] Visit Information Date & Time Provider Department Dept. Phone Encounter #  
 1/24/2018 10:50 AM Fede Chavarria, [address] [place] [address] Diabetes and Endocrinology [phone] [id number] Follow-up Instructions Return in about 4 months (around 5/24/2018). Upcoming Health Maintenance Date Due DTaP/Tdap/Td series (1 - Tdap) 3/19/1957 ZOSTER VACCINE AGE 60> 1/19/1996 GLAUCOMA SCREENING Q2Y 3/19/2001 Pneumococcal 65+ Low/Medium Risk (1 of 2 - PCV13) 3/19/2001 MEDICARE YEARLY EXAM 3/19/2001 Influenza Age 5 to Adult 8/1/2017 Allergies as of 1/24/2018  Review Complete On: 1/24/2018 By: Fede Chavarria MD  
 No Known Allergies Current Immunizations  Never Reviewed No immunizations on file. Not reviewed this visit You Were Diagnosed With   
  
 Codes Comments Subclinical hyperthyroidism    -  Primary ICD-10-CM: E05.90 ICD-9-CM: 242.90 Vitals BP Pulse Height(growth percentile) Weight(growth percentile) BMI Smoking Status 112/65 (BP 1 Location: Right arm, BP Patient Position: Sitting) 80 5' 8\" (1.727 m) 171 lb 3.2 oz (77.7 kg) 26.03 kg/m2 Never Smoker Vitals History BMI and BSA Data Body Mass Index Body Surface Area 26.03 kg/m 2 1.93 m 2 Preferred Pharmacy Pharmacy Name Phone [id number], [facility] [address] [phone] Your Updated Medication List  
  
   
This list is accurate as of: 1/24/18 11:18 AM.  Always use your most recent med list. amLODIPine 10 mg tablet Commonly known as:  Jonna Sierra Take  by mouth daily. aspirin delayed-release 81 mg tablet Take  by mouth daily. B.infantis-B.ani-B.long-B.bifi 10-15 mg Tbec Take  by mouth two (2) times a day. cholecalciferol (VITAMIN D3) 5,000 unit Tab tablet Commonly known as:  VITAMIN D3 Take  by mouth daily. cholestyramine 4 gram packet Commonly known as:  QUESTRAN  
MIX THE CONTENTS OF 1 PACKET IN A LIQUID AND TAKE BY MOUTH TWO (2) TIMES DAILY (WITH MEALS). hydroCHLOROthiazide 12.5 mg tablet Commonly known as:  HYDRODIURIL  
TAKE 1 TABLET BY MOUTH EVERY MORNING  
  
 irbesartan 300 mg tablet Commonly known as:  AVAPRO TAKE 1 TABLET BY MOUTH EVERY DAY FOR BLOOD PRESSURE  
  
 LIPITOR 10 mg tablet Generic drug:  atorvastatin Take  by mouth daily. methIMAzole 5 mg tablet Commonly known as:  TAPAZOLE  
5mg (1 tablet) daily  Indications: hyperthyroidism PRESERVISION AREDS PO Take  by mouth. SYSTANE (PROPYLENE GLYCOL) 0.4-0.3 % Drop Generic drug:  peg 400-propylene glycol Apply 1 Drop to eye.  
  
 tamsulosin 0.4 mg capsule Commonly known as:  FLOMAX Take 0.4 mg by mouth daily. valsartan 160 mg tablet Commonly known as:  DIOVAN Take  by mouth daily. valsartan-hydroCHLOROthiazide 160-12.5 mg per tablet Commonly known as:  DIOVAN-HCT Take 1 Tab by mouth daily. We Performed the Following T4, FREE [id number] CPT(R)] TSH 3RD GENERATION [46532 CPT(R)] Follow-up Instructions Return in about 4 months (around 5/24/2018). Introducing \[facility]\"" & HEALTH SERVICES! Marcos Sheppard introduces [employer] patient portal. Now you can access parts of your medical record, email your doctor's office, and request medication refills online. 1. In your internet browser, go to [email or web address] 2. Click on the First Time User? Click Here link in the Sign In box. You will see the New Member Sign Up page. 3. Enter your [employer] Access Code exactly as it appears below. You will not need to use this code after youve completed the sign-up process. If you do not sign up before the expiration date, you must request a new code. · [employer] Access Code: [email or web address] Expires: 4/8/2018  9:39 AM 
 
4. Enter the last four digits of your Social Security Number (xxxx) and Date of Birth (mm/dd/yyyy) as indicated and click Submit. You will be taken to the next sign-up page. 5. Create a [employer] ID. This will be your [employer] login ID and cannot be changed, so think of one that is secure and easy to remember. 6. Create a [employer] password. You can change your password at any time. 7. Enter your Password Reset Question and Answer. This can be used at a later time if you forget your password. 8. Enter your e-mail address. You will receive e-mail notification when new information is available in [address] 9. Click Sign Up. You can now view and download portions of your medical record. 10. Click the Download Summary menu link to download a portable copy of your medical information. If you have questions, please visit the Frequently Asked Questions section of the [employer] website. Remember, [employer] is NOT to be used for urgent needs. For medical emergencies, dial 911. Now available from your iPhone and Android! Please provide this summary of care documentation to your next provider. Your primary care clinician is listed as Micheline Reeves. If you have any questions after today's visit, please call [phone].

## 2018-01-24 NOTE — PROGRESS NOTES
CHIEF COMPLAINT: f/u evaluation for hyperthyroidism. HISTORY OF PRESENT ILLNESS:   Viral Sibley is a 81 y.o. male with a PMHx as noted below who presents to the endocrinology clinic for f/u evaluation of hyperthyroidism. Patient previously presented with labs suggestive of subclinical hyperthyroidism. Thyroid US did not reveal any nodules, just small cysts, but gland was enlarged at 6cm. Uptake and Scan was normal  Patient was trialled on low dose (5mg) of methimazole and eventually had a good response to therapy.   Methimazole started 12/19/16    Interval history:  The patient presents feeling well,  Denies symptoms of hyper or hypothyroidism,  He continues to take 5mg of methimazole once daily,  No recent labs for review,  Seen by neurology for evaluation of gait/tremor etc.,  Will have a neuropsych eval in the near future,    Review of thyroid labs:  Review of most recent thyroid function:  Lab Results   Component Value Date    TSH 2.460 05/26/2017    TSH 1.49 04/06/2017    TSH 1.900 02/27/2017    FT4 0.89 05/26/2017    FT4 0.6 04/06/2017    FT4 0.90 02/27/2017    T3LT 117 05/26/2017    T3LT 105 04/06/2017    T3LT 110 02/27/2017    TSILT 25 06/14/2016      TSILT = Thyroid stimulating antibodies  TMCLT = TPO antibodies  T3LT = Total T3 levels        PAST MEDICAL/SURGICAL HISTORY:   Past Medical History:   Diagnosis Date    Acute gouty arthropathy     Benign hypertensive heart disease without heart failure     Hypertrophy of prostate without urinary obstruction and other lower urinary tract symptoms (LUTS)     Memory difficulties     Other and unspecified hyperlipidemia      Past Surgical History:   Procedure Laterality Date    HX CATARACT REMOVAL  01/01/2008    Rt    HX COLONOSCOPY  7/15    Arabi, q 10 yrs    HX HERNIA REPAIR  01/01/1980       ALLERGIES:   No Known Allergies    MEDICATIONS ON ADMISSION:     Current Outpatient Prescriptions:     peg 400-propylene glycol (SYSTANE, PROPYLENE GLYCOL,) 0.4-0.3 % drop, Apply 1 Drop to eye., Disp: , Rfl:     hydroCHLOROthiazide (HYDRODIURIL) 12.5 mg tablet, TAKE 1 TABLET BY MOUTH EVERY MORNING, Disp: , Rfl: 1    VIT A/VIT C/VIT E/ZINC/COPPER (PRESERVISION AREDS PO), Take  by mouth., Disp: , Rfl:     methIMAzole (TAPAZOLE) 5 mg tablet, 5mg (1 tablet) daily  Indications: hyperthyroidism, Disp: 90 Tab, Rfl: 3    irbesartan (AVAPRO) 300 mg tablet, TAKE 1 TABLET BY MOUTH EVERY DAY FOR BLOOD PRESSURE, Disp: , Rfl: 3    cholecalciferol, VITAMIN D3, (VITAMIN D3) 5,000 unit tab tablet, Take  by mouth daily. , Disp: , Rfl:     aspirin delayed-release 81 mg tablet, Take  by mouth daily. , Disp: , Rfl:     B.infantis-B.ani-B.long-B.bifi 10-15 mg TbEC, Take  by mouth two (2) times a day., Disp: , Rfl:     amLODIPine (NORVASC) 10 mg tablet, Take  by mouth daily. , Disp: , Rfl:     atorvastatin (LIPITOR) 10 mg tablet, Take  by mouth daily. , Disp: , Rfl:     tamsulosin (FLOMAX) 0.4 mg capsule, Take 0.4 mg by mouth daily. , Disp: , Rfl:     cholestyramine (QUESTRAN) 4 gram packet, MIX THE CONTENTS OF 1 PACKET IN A LIQUID AND TAKE BY MOUTH TWO (2) TIMES DAILY (WITH MEALS). , Disp: 60 Packet, Rfl: 3    valsartan-hydroCHLOROthiazide (DIOVAN-HCT) 160-12.5 mg per tablet, Take 1 Tab by mouth daily. , Disp: , Rfl:     valsartan (DIOVAN) 160 mg tablet, Take  by mouth daily. , Disp: , Rfl:     SOCIAL HISTORY:   Social History     Social History    Marital status:      Spouse name: N/A    Number of children: N/A    Years of education: N/A     Occupational History    Not on file.      Social History Main Topics    Smoking status: Never Smoker    Smokeless tobacco: Never Used    Alcohol use 1.0 oz/week     2 Standard drinks or equivalent per week      Comment: social    Drug use: Not on file    Sexual activity: Not on file     Other Topics Concern    Not on file     Social History Narrative       FAMILY HISTORY:  Family History   Problem Relation Age of Onset    Hypertension Mother     Stroke Mother     Hypertension Father     Stroke Father     Breast Cancer Sister        REVIEW OF SYSTEMS: Complete ROS assessed and noted for that which is described above, all else are negative. Eyes: normal  ENT: normal  CVS: normal  Resp: normal  GI: normal  : normal  GYN: normal  Endocrine: normal  Integument: normal  Musculoskeletal: normal  Neuro: normal  Psych: normal      PHYSICAL EXAMINATION:    VITAL SIGNS:  Visit Vitals    /65 (BP 1 Location: Right arm, BP Patient Position: Sitting)    Pulse 80    Ht 5' 8\" (1.727 m)    Wt 171 lb 3.2 oz (77.7 kg)    BMI 26.03 kg/m2       GENERAL: NCAT, Sitting comfortably, NAD  EYES: EOMI, non-icteric, no proptosis  Ear/Nose/Throat: NCAT, no inflammation, thyroid gland not appreciably enlarged  LYMPH NODES: No LAD  CARDIOVASCULAR: S1 S2, RRR, No murmur, 2+ radial pulses  RESPIRATORY: CTA b/l, no wheeze/rales  GASTROINTESTINAL: soft, NT, ND,  MUSCULOSKELETAL: Normal ROM, no atrophy  SKIN: warm, no edema/rash/ or other skin changes  NEUROLOGIC: 5/5 power all extremities, no tremors, AAOx3  PSYCHIATRIC: Normal affect, Normal insight and judgement         REVIEW OF LABORATORY AND RADIOLOGY DATA:   Labs and documentation have been reviewed as described above. ASSESSMENT AND PLAN:   Rangel Souza is a 81 y.o. male with a PMHx as noted above who presents to the endocrinology clinic for evaluation of hyperthyroidism. 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 Stable however at this time.     Clinically stable with methimazole 5mg daily,  Considering his upcoming neuropsych eval, would rather keep stable and not trial off for now,  Will consider trial off on future visit,  Will continue same dose but will obtain labs today for evaluation,  Will modify dose if needed,    Plan for a 4 month f/u,   Will adjust if labs are not suggestive of stable thyroid function,    Ronan Valera.  [address] Diabetes & Endocrinology

## 2018-01-25 ENCOUNTER — TELEPHONE (OUTPATIENT)
Dept: ENDOCRINOLOGY | Age: 83
End: 2018-01-25

## 2018-01-25 LAB
T4 FREE SERPL-MCNC: 0.92 NG/DL (ref 0.82–1.77)
TSH SERPL DL<=0.005 MIU/L-ACNC: 2.86 UIU/ML (ref 0.45–4.5)

## 2018-01-25 NOTE — TELEPHONE ENCOUNTER
----- Message from Bogdan Flanagan MD sent at 1/25/2018  9:33 AM EST -----  Thyroid function looks great,   No change in methimazole dose, will continue for now,  Will re-evaluate with next visit,    Magan Soliz.  [address] [facility] Endocrinology  [address] [place] [address]

## 2018-01-25 NOTE — PROGRESS NOTES
Thyroid function looks great,   No change in methimazole dose, will continue for now,  Will re-evaluate with next visit,    Lorie Chen.  [address] [facility] Endocrinology  [address] [facility]

## 2018-01-25 NOTE — TELEPHONE ENCOUNTER
I called  Cata Nealan and relayed the message from Dr. Panchal Monday. He understood the information and was very happy to hear this.   Flako Blanco

## 2018-03-19 ENCOUNTER — OFFICE VISIT (OUTPATIENT)
Dept: FAMILY MEDICINE CLINIC | Age: 83
End: 2018-03-19

## 2018-03-19 VITALS
HEART RATE: 88 BPM | DIASTOLIC BLOOD PRESSURE: 62 MMHG | TEMPERATURE: 98.9 F | OXYGEN SATURATION: 97 % | HEIGHT: 68 IN | WEIGHT: 178.8 LBS | BODY MASS INDEX: 27.1 KG/M2 | RESPIRATION RATE: 28 BRPM | SYSTOLIC BLOOD PRESSURE: 120 MMHG

## 2018-03-19 DIAGNOSIS — R05.9 COUGH: ICD-10-CM

## 2018-03-19 RX ORDER — BENZONATATE 200 MG/1
200 CAPSULE ORAL
Qty: 30 CAP | Refills: 1 | Status: SHIPPED | OUTPATIENT
Start: 2018-03-19 | End: 2018-03-26

## 2018-03-19 NOTE — ACP (ADVANCE CARE PLANNING)
Advance care planning discussed with patient has a form and instructed to bring in to copy in chart.

## 2018-03-19 NOTE — MR AVS SNAPSHOT
Ward Atkinson 
 
 
 [address] 
[phone] Patient: Mary Velazquez 
MRN: [id number] RFF:9/74/0500 Visit Information Date & Time Provider Department Dept. Phone Encounter #  
 3/19/2018  3:00 PM Cheryl Rangel NP [facility] [address] [facility] [phone] [id number] Your Appointments 5/24/2018 10:50 AM  
Follow Up with MD [facility] Appt Note: 4 month f/u  Hyperthyroid [address] [phone] [address] Upcoming Health Maintenance Date Due ZOSTER VACCINE AGE 60> 1/19/1996 GLAUCOMA SCREENING Q2Y 3/19/2001 Pneumococcal 65+ Low/Medium Risk (1 of 2 - PCV13) 3/19/2001 Influenza Age 5 to Adult 8/1/2017 MEDICARE YEARLY EXAM 3/20/2019 DTaP/Tdap/Td series (2 - Td) 3/19/2028 Allergies as of 3/19/2018  Review Complete On: 3/19/2018 By: Cheryl Rangel NP No Known Allergies Current Immunizations  Never Reviewed No immunizations on file. Not reviewed this visit You Were Diagnosed With   
  
 Codes Comments BMI 27.0-27.9,adult    -  Primary ICD-10-CM: I81.43 ICD-9-CM: V85.23 Cough     ICD-10-CM: R05 ICD-9-CM: 206. 2 Vitals BP Pulse Temp Resp Height(growth percentile) 120/62 (BP 1 Location: Left arm, BP Patient Position: Sitting) 88 98.9 °F (37.2 °C) (Temporal) 28 5' 7.5\" (1.715 m) Weight(growth percentile) SpO2 BMI Smoking Status 178 lb 12.8 oz (81.1 kg) 97% 27.59 kg/m2 Never Smoker Vitals History BMI and BSA Data Body Mass Index Body Surface Area  
 27.59 kg/m 2 1.97 m 2 Preferred Pharmacy Pharmacy Name Phone [address] [facility] [place] Mike Roldan Mai [phone] Your Updated Medication List  
  
   
 This list is accurate as of 3/19/18  3:56 PM.  Always use your most recent med list. amLODIPine 10 mg tablet Commonly known as:  Sallynell Manual Take  by mouth daily. aspirin delayed-release 81 mg tablet Take  by mouth daily. B.infantis-B.ani-B.long-B.bifi 10-15 mg Tbec Take  by mouth two (2) times a day. benzonatate 200 mg capsule Commonly known as:  TESSALON Take 1 Cap by mouth three (3) times daily as needed for Cough for up to 7 days. cholecalciferol (VITAMIN D3) 5,000 unit Tab tablet Commonly known as:  VITAMIN D3 Take  by mouth daily. hydroCHLOROthiazide 12.5 mg tablet Commonly known as:  HYDRODIURIL  
TAKE 1 TABLET BY MOUTH EVERY MORNING  
  
 irbesartan 300 mg tablet Commonly known as:  AVAPRO TAKE 1 TABLET BY MOUTH EVERY DAY FOR BLOOD PRESSURE  
  
 LIPITOR 10 mg tablet Generic drug:  atorvastatin Take  by mouth daily. methIMAzole 5 mg tablet Commonly known as:  TAPAZOLE  
5mg (1 tablet) daily  Indications: hyperthyroidism PRESERVISION AREDS PO Take  by mouth. SYSTANE (PROPYLENE GLYCOL) 0.4-0.3 % Drop Generic drug:  peg 400-propylene glycol Apply 1 Drop to eye.  
  
 tamsulosin 0.4 mg capsule Commonly known as:  FLOMAX Take 0.4 mg by mouth daily. valsartan 160 mg tablet Commonly known as:  DIOVAN Take  by mouth daily. valsartan-hydroCHLOROthiazide 160-12.5 mg per tablet Commonly known as:  DIOVAN-HCT Take 1 Tab by mouth daily. Prescriptions Sent to Pharmacy Refills  
 benzonatate (TESSALON) 200 mg capsule 1 Sig: Take 1 Cap by mouth three (3) times daily as needed for Cough for up to 7 days. Class: Normal  
 Pharmacy: [address], [address] [facility] Kaitlindsay Sharp Ph #: [phone] Route: Oral  
  
Introducing [facility] & HEALTH SERVICES!    
 Yfn Mckeon introduces [employer] patient portal. Now you can access parts of your medical record, email your doctor's office, and request medication refills online. 1. In your internet browser, go to [email or web address] 2. Click on the First Time User? Click Here link in the Sign In box. You will see the New Member Sign Up page. 3. Enter your [employer] Access Code exactly as it appears below. You will not need to use this code after youve completed the sign-up process. If you do not sign up before the expiration date, you must request a new code. · [employer] Access Code: [email or web address] Expires: 4/8/2018 10:39 AM 
 
4. Enter the last four digits of your Social Security Number (xxxx) and Date of Birth (mm/dd/yyyy) as indicated and click Submit. You will be taken to the next sign-up page. 5. Create a [employer] ID. This will be your [employer] login ID and cannot be changed, so think of one that is secure and easy to remember. 6. Create a [employer] password. You can change your password at any time. 7. Enter your Password Reset Question and Answer. This can be used at a later time if you forget your password. 8. Enter your e-mail address. You will receive e-mail notification when new information is available in [address] 9. Click Sign Up. You can now view and download portions of your medical record. 10. Click the Download Summary menu link to download a portable copy of your medical information. If you have questions, please visit the Frequently Asked Questions section of the [employer] website. Remember, [employer] is NOT to be used for urgent needs. For medical emergencies, dial 911. Now available from your iPhone and Android! Please provide this summary of care documentation to your next provider. Your primary care clinician is listed as Jacob Lilly. If you have any questions after today's visit, please call [phone].

## 2018-03-20 NOTE — PROGRESS NOTES
Subjective:   Jose Castellanos is a 81 y.o. male who complains of congestion, sneezing, post nasal drip and dry cough for 4 days, gradually worsening since that time. He denies a history of shortness of breath and wheezing. Evaluation to date: none. Treatment to date: OTC products. Patient does not smoke cigarettes. Relevant PMH: No pertinent additional PMH. Patient Active Problem List   Diagnosis Code    Mixed hyperlipidemia E78.2    Benign hypertensive heart disease without heart failure I11.9    Acute gouty arthropathy M10.9    Benign non-nodular prostatic hyperplasia without lower urinary tract symptoms N40.0    Lipoma of joint D17.9    Memory difficulties R41.3     Current Outpatient Prescriptions   Medication Sig Dispense Refill    benzonatate (TESSALON) 200 mg capsule Take 1 Cap by mouth three (3) times daily as needed for Cough for up to 7 days. 30 Cap 1    peg 400-propylene glycol (SYSTANE, PROPYLENE GLYCOL,) 0.4-0.3 % drop Apply 1 Drop to eye.  hydroCHLOROthiazide (HYDRODIURIL) 12.5 mg tablet TAKE 1 TABLET BY MOUTH EVERY MORNING  1    VIT A/VIT C/VIT E/ZINC/COPPER (PRESERVISION AREDS PO) Take  by mouth.  methIMAzole (TAPAZOLE) 5 mg tablet 5mg (1 tablet) daily  Indications: hyperthyroidism 90 Tab 3    irbesartan (AVAPRO) 300 mg tablet TAKE 1 TABLET BY MOUTH EVERY DAY FOR BLOOD PRESSURE  3    cholecalciferol, VITAMIN D3, (VITAMIN D3) 5,000 unit tab tablet Take  by mouth daily.  aspirin delayed-release 81 mg tablet Take  by mouth daily.  B.infantis-B.ani-B.long-B.bifi 10-15 mg TbEC Take  by mouth two (2) times a day.  amLODIPine (NORVASC) 10 mg tablet Take  by mouth daily.  atorvastatin (LIPITOR) 10 mg tablet Take  by mouth daily.  tamsulosin (FLOMAX) 0.4 mg capsule Take 0.4 mg by mouth daily.  valsartan-hydroCHLOROthiazide (DIOVAN-HCT) 160-12.5 mg per tablet Take 1 Tab by mouth daily.  valsartan (DIOVAN) 160 mg tablet Take  by mouth daily. No Known Allergies  Past Medical History:   Diagnosis Date    Acute gouty arthropathy     Benign hypertensive heart disease without heart failure     Hypertrophy of prostate without urinary obstruction and other lower urinary tract symptoms (LUTS)     Memory difficulties     Other and unspecified hyperlipidemia      Past Surgical History:   Procedure Laterality Date    HX CATARACT REMOVAL  01/01/2008    Rt    HX COLONOSCOPY  7/15    Leatha, q 10 yrs    HX HERNIA REPAIR  01/01/1980     Family History   Problem Relation Age of Onset    Hypertension Mother     Stroke Mother     Hypertension Father     Stroke Father     Breast Cancer Sister      Social History   Substance Use Topics    Smoking status: Never Smoker    Smokeless tobacco: Never Used    Alcohol use 1.0 oz/week     2 Standard drinks or equivalent per week      Comment: social        Review of Systems  Pertinent items are noted in HPI. Objective:     Visit Vitals    /62 (BP 1 Location: Left arm, BP Patient Position: Sitting)    Pulse 88    Temp 98.9 °F (37.2 °C) (Temporal)    Resp 28    Ht 5' 7.5\" (1.715 m)    Wt 178 lb 12.8 oz (81.1 kg)    SpO2 97%    BMI 27.59 kg/m2     General:  alert, cooperative, no distress   Eyes: negative   Ears: normal TM's and external ear canals AU   Sinuses: Normal paranasal sinuses without tenderness   Mouth:  Lips, mucosa, and tongue normal. Teeth and gums normal   Neck: supple, symmetrical, trachea midline and no adenopathy. Heart: S1 and S2 normal, no murmurs noted. Lungs: dullness to percussion L apex, rales L anterior, wheezes L anterior   Abdomen: soft, non-tender. Bowel sounds normal. No masses,  no organomegaly        Assessment/Plan:   viral upper respiratory illness  Suggested symptomatic OTC remedies. RTC prn. Discussed diagnosis and treatment of viral URIs. Discussed the importance of avoiding unnecessary antibiotic therapy. BMIDiscussed the patient's BMI with him.   The BMI follow up plan is as follows:     dietary management education, guidance, and counseling  encourage exercise  monitor weight  prescribed dietary intake    An After Visit Summary was printed and given to the patient. ICD-10-CM ICD-9-CM    1. BMI 27.0-27.9,adult Z68.27 V85.23    2. Cough R05 786.2      Orders Placed This Encounter    benzonatate (TESSALON) 200 mg capsule     Diagnoses and all orders for this visit:    1. BMI 27.0-27.9,adult    2. Cough    Other orders  -     benzonatate (TESSALON) 200 mg capsule; Take 1 Cap by mouth three (3) times daily as needed for Cough for up to 7 days.     Skippy Dilling NP-C

## 2018-03-22 ENCOUNTER — OFFICE VISIT (OUTPATIENT)
Dept: FAMILY MEDICINE CLINIC | Age: 83
End: 2018-03-22

## 2018-03-22 VITALS
RESPIRATION RATE: 14 BRPM | WEIGHT: 175.8 LBS | OXYGEN SATURATION: 97 % | HEIGHT: 68 IN | HEART RATE: 85 BPM | DIASTOLIC BLOOD PRESSURE: 60 MMHG | SYSTOLIC BLOOD PRESSURE: 110 MMHG | TEMPERATURE: 98.7 F | BODY MASS INDEX: 26.64 KG/M2

## 2018-03-22 DIAGNOSIS — R09.89 ABNORMAL LUNG SOUNDS: ICD-10-CM

## 2018-03-22 DIAGNOSIS — R09.89 CHEST CONGESTION: ICD-10-CM

## 2018-03-22 DIAGNOSIS — R05.9 COUGH: ICD-10-CM

## 2018-03-22 DIAGNOSIS — J18.9 ATYPICAL PNEUMONIA: Primary | ICD-10-CM

## 2018-03-22 PROBLEM — M1A.09X0 IDIOPATHIC CHRONIC GOUT OF MULTIPLE SITES WITHOUT TOPHUS: Status: ACTIVE | Noted: 2018-03-22

## 2018-03-22 RX ORDER — AZITHROMYCIN 250 MG/1
TABLET, FILM COATED ORAL
Qty: 6 TAB | Refills: 0 | Status: SHIPPED | OUTPATIENT
Start: 2018-03-22 | End: 2018-08-24 | Stop reason: ALTCHOICE

## 2018-03-22 NOTE — MR AVS SNAPSHOT
[address] [facility] 
 
 
 [address] [address] 
[phone] Patient: Glo Ortiz 
MRN: [id number]:3/44/7807 Visit Information Date & Time Provider Department Dept. Phone Encounter #  
 3/22/2018  2:20 PM Arlene HayMars 72 [phone] [id number] Your Appointments 5/24/2018 10:50 AM  
Follow Up with Zhanna Murcia MD  
[facility] Appt Note: 4 month f/u  Hyperthyroid [address] [facility] [address] [phone] [address] [facility] Upcoming Health Maintenance Date Due ZOSTER VACCINE AGE 60> 1/19/1996 GLAUCOMA SCREENING Q2Y 3/19/2001 Pneumococcal 65+ Low/Medium Risk (1 of 2 - PCV13) 3/19/2001 MEDICARE YEARLY EXAM 3/20/2019 DTaP/Tdap/Td series (2 - Td) 3/19/2028 Allergies as of 3/22/2018  Review Complete On: 3/22/2018 By: Arlene Hay MD  
 No Known Allergies Current Immunizations  Never Reviewed No immunizations on file. Not reviewed this visit You Were Diagnosed With   
  
 Codes Comments Atypical pneumonia    -  Primary ICD-10-CM: J18.9 ICD-9-CM: 826 Cough     ICD-10-CM: R05 ICD-9-CM: 861. 2 Chest congestion     ICD-10-CM: R09.89 ICD-9-CM: 786.9 Abnormal lung sounds     ICD-10-CM: R09.89 ICD-9-CM: 213. 7 Vitals BP Pulse Temp Resp Height(growth percentile) 110/60 (BP 1 Location: Left arm, BP Patient Position: Sitting) 85 98.7 °F (37.1 °C) (Temporal) 14 5' 7.5\" (1.715 m) Weight(growth percentile) SpO2 PF BMI Smoking Status 175 lb 12.8 oz (79.7 kg) 97% 350 L/min 27.13 kg/m2 Never Smoker Vitals History BMI and BSA Data Body Mass Index Body Surface Area  
 27.13 kg/m 2 1.95 m 2 Preferred Pharmacy Pharmacy Name Phone [address] [place] [address] Mike Sandoval [phone] Your Updated Medication List  
  
   
This list is accurate as of 3/22/18  4:37 PM.  Always use your most recent med list. amLODIPine 10 mg tablet Commonly known as:  Brian Free Take  by mouth daily. aspirin delayed-release 81 mg tablet Take  by mouth daily. azithromycin 250 mg tablet Commonly known as:  English Merrill Take two tablets today then one tablet daily for worsening infection B.infantis-B.ani-B.long-B.bifi 10-15 mg Tbec Take  by mouth two (2) times a day. benzonatate 200 mg capsule Commonly known as:  TESSALON Take 1 Cap by mouth three (3) times daily as needed for Cough for up to 7 days. cholecalciferol (VITAMIN D3) 5,000 unit Tab tablet Commonly known as:  VITAMIN D3 Take  by mouth daily. hydroCHLOROthiazide 12.5 mg tablet Commonly known as:  HYDRODIURIL  
TAKE 1 TABLET BY MOUTH EVERY MORNING  
  
 irbesartan 300 mg tablet Commonly known as:  AVAPRO TAKE 1 TABLET BY MOUTH EVERY DAY FOR BLOOD PRESSURE  
  
 LIPITOR 10 mg tablet Generic drug:  atorvastatin Take  by mouth daily. methIMAzole 5 mg tablet Commonly known as:  TAPAZOLE  
5mg (1 tablet) daily  Indications: hyperthyroidism PRESERVISION AREDS PO Take  by mouth. SYSTANE (PROPYLENE GLYCOL) 0.4-0.3 % Drop Generic drug:  peg 400-propylene glycol Apply 1 Drop to eye.  
  
 tamsulosin 0.4 mg capsule Commonly known as:  FLOMAX Take 0.4 mg by mouth daily. Prescriptions Sent to Pharmacy Refills  
 azithromycin (ZITHROMAX) 250 mg tablet 0 Sig: Take two tablets today then one tablet daily for worsening infection Class: Normal  
 Pharmacy: [address] [place] [address] Mike Grant Ph #: [phone] We Performed the Following CBC WITH AUTOMATED DIFF [16982 CPT(R)] To-Do List   
 03/22/2018 Imaging:  XR CHEST PA LAT Introducing [facility] & HEALTH SERVICES! [address] introduces [employer] patient portal. Now you can access parts of your medical record, email your doctor's office, and request medication refills online. 1. In your internet browser, go to [email or web address] Online 2. Click on the First Time User? Click Here link in the Sign In box. You will see the New Member Sign Up page. 3. Enter your [employer] Access Code exactly as it appears below. You will not need to use this code after youve completed the sign-up process. If you do not sign up before the expiration date, you must request a new code. · [employer] Access Code: [email or web address] Expires: 4/8/2018 10:39 AM 
 
4. Enter the last four digits of your Social Security Number (xxxx) and Date of Birth (mm/dd/yyyy) as indicated and click Submit. You will be taken to the next sign-up page. 5. Create a [employer] ID. This will be your [employer] login ID and cannot be changed, so think of one that is secure and easy to remember. 6. Create a [employer] password. You can change your password at any time. 7. Enter your Password Reset Question and Answer. This can be used at a later time if you forget your password. 8. Enter your e-mail address. You will receive e-mail notification when new information is available in [address] 9. Click Sign Up. You can now view and download portions of your medical record. 10. Click the Download Summary menu link to download a portable copy of your medical information. If you have questions, please visit the Frequently Asked Questions section of the [employer] website. Remember, [employer] is NOT to be used for urgent needs. For medical emergencies, dial 911. Now available from your iPhone and Android! Please provide this summary of care documentation to your next provider. Your primary care clinician is listed as Orestes Ruiz.  If you have any questions after today's visit, please call [phone].

## 2018-03-22 NOTE — PATIENT INSTRUCTIONS
Walking Pneumonia: Care Instructions  Your Care Instructions    Walking pneumonia is an infection of the lungs caused by the mycoplasma bacteria. This form of pneumonia is usually mild and feels like a chest cold, but it can get worse. The symptoms of cough, headache, and a low fever start slowly. The infection is usually so mild that you may walk around with it without knowing that you have it. Most people don't get sick enough to be in the hospital.  Walking pneumonia is usually treated with antibiotics. Your cough may last for 2 to 3 weeks after the infection has been treated. You may have some wheezing too. These symptoms will go away over time. Follow-up care is a key part of your treatment and safety. Be sure to make and go to all appointments, and call your doctor if you are having problems. It's also a good idea to know your test results and keep a list of the medicines you take. How can you care for yourself at home? · Be safe with medicines. Take your medicines exactly as prescribed. Call your doctor if you think you are having a problem with your medicine. · Take your antibiotics as directed. Do not stop taking them just because you feel better. You need to take the full course of antibiotics. · Ask your doctor if you can take an over-the-counter pain medicine, such as acetaminophen (Tylenol), ibuprofen (Advil, Motrin), or naproxen (Aleve). Read and follow all instructions on the label. · Do not take two or more pain medicines at the same time unless the doctor told you to. Many pain medicines have acetaminophen, which is Tylenol. Too much acetaminophen (Tylenol) can be harmful. · Take care of your cough so you can rest. A cough that brings up mucus from your lungs is common with pneumonia. It is one way your body gets rid of the infection. But if coughing keeps you from resting or causes severe fatigue and chest-wall pain, talk to your doctor.  He or she may suggest that you take a medicine to reduce the cough. · Do not smoke or allow others to smoke around you. When should you call for help? Call 911 anytime you think you may need emergency care. For example, call if:  ? · You have severe trouble breathing. ?Call your doctor now or seek immediate medical care if:  ? · You cough up dark brown or bloody mucus (sputum). ? · You have new or worse trouble breathing. ? · You are dizzy or lightheaded, or you feel like you may faint. ? Watch closely for changes in your health, and be sure to contact your doctor if:  ? · You have a new or higher fever. ? · Your cough or wheezing has not gone away after 2 to 4 weeks. ? · You are not getting better as expected. Where can you learn more? Go to http://divya-jocelin.info/. Enter V660 in the search box to learn more about \"Walking Pneumonia: Care Instructions. \"  Current as of: May 12, 2017  Content Version: 11.4  © 6410-6532 Healthwise, Incorporated. Care instructions adapted under license by [employer] (which disclaims liability or warranty for this information). If you have questions about a medical condition or this instruction, always ask your healthcare professional. [address] any warranty or liability for your use of this information.

## 2018-03-22 NOTE — PROGRESS NOTES
Mike Sexton is a 81 y.o. male presenting for/with:    Cough    HPI:  Symptoms include productive cough, chest congestion for past couple months, gradually worsening. No hx of smoking or excessive dust exposure, no prior hx of seasonal allergies or sinuses. Evaluation to date: seen previously and thought to have a viral URI. Treatment to date: tessalon perles, not helpful. PMH, SH, Medications/Allergies: reviewed, on chart. ROS:  Constitutional: No fever, chills or weight loss  Respiratory: No cough, SOB   CV: No chest pain or Palpitations    Visit Vitals    /60 (BP 1 Location: Left arm, BP Patient Position: Sitting)    Pulse 85    Temp 98.7 °F (37.1 °C) (Temporal)    Resp 14    Ht 5' 7.5\" (1.715 m)    Wt 175 lb 12.8 oz (79.7 kg)    SpO2 97%    BMI 27.13 kg/m2     Wt Readings from Last 3 Encounters:   03/22/18 175 lb 12.8 oz (79.7 kg)   03/19/18 178 lb 12.8 oz (81.1 kg)   01/24/18 171 lb 3.2 oz (77.7 kg)     Physical Examination: General appearance - alert, well appearing, and in no distress  Mental status - alert, oriented to person, place, and time  Eyes - pupils equal and reactive, extraocular eye movements intact  ENT - bilateral external ears and nose normal. Normal lips  Neck - supple, no significant adenopathy, no thyromegaly or mass  Lymphatics - no palpable lymphadenopathy, no hepatosplenomegaly  Chest - biapical coarse crackles on auscultation, no wheezes. symmetric air entry. Unchanged with neb albuterol/atrovent. Heart - normal rate, regular rhythm, normal S1, S2, no murmurs, rubs, clicks or gallops  Extremities - peripheral pulses normal, no pedal edema, no clubbing or cyanosis   pre, 350 post. Coughed a lot. A/P:  Cough x1mo, productive  Check CXR, CBC, consider PFT's. Tx with zpack for probable atypical ASDZ. F/U per results.

## 2018-08-24 ENCOUNTER — OFFICE VISIT (OUTPATIENT)
Dept: ENDOCRINOLOGY | Age: 83
End: 2018-08-24

## 2018-08-24 VITALS
HEART RATE: 82 BPM | WEIGHT: 175.7 LBS | SYSTOLIC BLOOD PRESSURE: 112 MMHG | HEIGHT: 67 IN | BODY MASS INDEX: 27.58 KG/M2 | DIASTOLIC BLOOD PRESSURE: 56 MMHG

## 2018-08-24 DIAGNOSIS — E05.90 SUBCLINICAL HYPERTHYROIDISM: Primary | ICD-10-CM

## 2018-08-24 RX ORDER — HYDROCHLOROTHIAZIDE 25 MG/1
TABLET ORAL
Refills: 3 | COMMUNITY
Start: 2018-06-30 | End: 2019-04-18 | Stop reason: ALTCHOICE

## 2018-08-24 NOTE — MR AVS SNAPSHOT
[address] [place] [address] [address] [address] [phone] Patient: Bucky Montez 
MRN: [id number] [facility]:7[birth year]17[birth year]66 Visit Information Date & Time Provider Department Dept. Phone Encounter #  
 8/24/2018  3:10 PM Meme Isaac, [address] [place] [address] Diabetes and Endocrinology [phone] [id number] Follow-up Instructions Return in about 4 months (around 12/24/2018). Upcoming Health Maintenance Date Due ZOSTER VACCINE AGE 60> 1/19/1996 GLAUCOMA SCREENING Q2Y 3/19/2001 Pneumococcal 65+ Low/Medium Risk (1 of 2 - PCV13) 3/19/2001 Influenza Age 5 to Adult 8/1/2018 MEDICARE YEARLY EXAM 3/20/2019 DTaP/Tdap/Td series (2 - Td) 3/19/2028 Allergies as of 8/24/2018  Review Complete On: 8/24/2018 By: Meme Isaac MD  
 No Known Allergies Current Immunizations  Never Reviewed No immunizations on file. Not reviewed this visit You Were Diagnosed With   
  
 Codes Comments Subclinical hyperthyroidism    -  Primary ICD-10-CM: E05.90 ICD-9-CM: 242.90 Vitals BP Pulse Height(growth percentile) Weight(growth percentile) BMI Smoking Status 112/56 (BP 1 Location: Left arm, BP Patient Position: Sitting) 82 5' 7\" (1.702 m) 175 lb 11.2 oz (79.7 kg) 27.52 kg/m2 Never Smoker BMI and BSA Data Body Mass Index Body Surface Area  
 27.52 kg/m 2 1.94 m 2 Preferred Pharmacy Pharmacy Name Phone [address], [address] [place] [address] Mike Scott Ingris [phone] Your Updated Medication List  
  
   
This list is accurate as of 8/24/18  3:23 PM.  Always use your most recent med list. amLODIPine 10 mg tablet Commonly known as:  Dockery Haim Take  by mouth daily. aspirin delayed-release 81 mg tablet Take  by mouth daily. B.infantis-B.ani-B.long-B.bifi 10-15 mg Tbec Take  by mouth two (2) times a day. cholecalciferol (VITAMIN D3) 5,000 unit Tab tablet Commonly known as:  VITAMIN D3 Take  by mouth daily. * hydroCHLOROthiazide 12.5 mg tablet Commonly known as:  HYDRODIURIL  
TAKE 1 TABLET BY MOUTH EVERY MORNING  
  
 * hydroCHLOROthiazide 25 mg tablet Commonly known as:  HYDRODIURIL  
TAKE 1 TABLET BY MOUTH EVERY DAY  
  
 irbesartan 300 mg tablet Commonly known as:  AVAPRO TAKE 1 TABLET BY MOUTH EVERY DAY FOR BLOOD PRESSURE  
  
 LIPITOR 10 mg tablet Generic drug:  atorvastatin Take  by mouth daily. methIMAzole 5 mg tablet Commonly known as:  TAPAZOLE  
5mg (1 tablet) daily  Indications: hyperthyroidism PRESERVISION AREDS PO Take  by mouth. SYSTANE (PROPYLENE GLYCOL) 0.4-0.3 % Drop Generic drug:  peg 400-propylene glycol Apply 1 Drop to eye.  
  
 tamsulosin 0.4 mg capsule Commonly known as:  FLOMAX Take 0.4 mg by mouth daily. * Notice: This list has 2 medication(s) that are the same as other medications prescribed for you. Read the directions carefully, and ask your doctor or other care provider to review them with you. We Performed the Following T4, FREE [id number] CPT(R)] TSH 3RD GENERATION [30705 CPT(R)] Follow-up Instructions Return in about 4 months (around 12/24/2018). Patient Instructions Plan for labs today, Will consider 1/2 tablet once daily for 4 months, before discontinuing the dose, See you in about 4 months, 
Call with concerns, Vasyl Cha. [facility] Endocr[facility]ology [facility] Introducing [facility]! [employer] introduces [employer] patient portal. Now you can access parts of your medical record, email your doctor's office, and request medication refills online. 1. In your internet browser, go to [email or web address] 2. Click on the First Time User? Click Here link in the Sign In box. You will see the New Member Sign Up page. 3. Enter your [employer] Access Code exactly as it appears below. You will not need to use this code after youve completed the sign-up process. If you do not sign up before the expiration date, you must request a new code. · [employer] Access Code: [email or web address] Expires: 11/22/2018  3:22 PM 
 
4. Enter the last four digits of your Social Security Number (xxxx) and Date of Birth (mm/dd/yyyy) as indicated and click Submit. You will be taken to the next sign-up page. 5. Create a [employer] ID. This will be your [employer] login ID and cannot be changed, so think of one that is secure and easy to remember. 6. Create a [employer] password. You can change your password at any time. 7. Enter your Password Reset Question and Answer. This can be used at a later time if you forget your password. 8. Enter your e-mail address. You will receive e-mail notification when new information is available in [address] 9. Click Sign Up. You can now view and download portions of your medical record. 10. Click the Download Summary menu link to download a portable copy of your medical information. If you have questions, please visit the Frequently Asked Questions section of the [employer] website. Remember, [employer] is NOT to be used for urgent needs. For medical emergencies, dial 911. Now available from your iPhone and Android! Please provide this summary of care documentation to your next provider. Your primary care clinician is listed as Meena Vasquez. If you have any questions after today's visit, please call [phone].

## 2018-08-24 NOTE — PATIENT INSTRUCTIONS
Plan for labs today,   Will consider 1/2 tablet once daily for 4 months, before discontinuing the dose,  See you in about 4 months,  Call with concerns,     Fely Brown.  [address] [facility] Endocrinology  [address] [place] [address]

## 2018-08-24 NOTE — PROGRESS NOTES
CHIEF COMPLAINT: f/u evaluation for hyperthyroidism. HISTORY OF PRESENT ILLNESS:   Pat Childress is a 81 y.o. male with a PMHx as noted below who presents to the endocrinology clinic for f/u evaluation of hyperthyroidism. Patient previously presented with labs suggestive of subclinical hyperthyroidism. Thyroid US did not reveal any nodules, just small cysts, but gland was enlarged at 6cm. Uptake and Scan was normal  Patient was trialled on low dose (5mg) of methimazole and eventually had a good response to therapy.   Methimazole started 12/19/16    Interval history:  Previously continued on 5mg of methimazole once daily,  Taking as directed  Denies symptoms of hyper or hypothyroidism,  No recent labs for review,  Review of most recent thyroid function:  Lab Results   Component Value Date    TSH 2.860 01/24/2018    TSH 2.460 05/26/2017    TSH 1.49 04/06/2017    FT4 0.92 01/24/2018    FT4 0.89 05/26/2017    FT4 0.6 04/06/2017    T3LT 117 05/26/2017    T3LT 105 04/06/2017    T3LT 110 02/27/2017    TSILT 25 06/14/2016      TSILT = Thyroid stimulating antibodies  TMCLT = TPO antibodies  T3LT = Total T3 levels        PAST MEDICAL/SURGICAL HISTORY:   Past Medical History:   Diagnosis Date    Acute gouty arthropathy     Benign hypertensive heart disease without heart failure     Hypertrophy of prostate without urinary obstruction and other lower urinary tract symptoms (LUTS)     Memory difficulties     Other and unspecified hyperlipidemia      Past Surgical History:   Procedure Laterality Date    HX CATARACT REMOVAL  01/01/2008    Rt    HX COLONOSCOPY  7/15    [place], q 10 yrs    HX HERNIA REPAIR  01/01/1980       ALLERGIES:   No Known Allergies    MEDICATIONS ON ADMISSION:     Current Outpatient Prescriptions:     azithromycin (ZITHROMAX) 250 mg tablet, Take two tablets today then one tablet daily for worsening infection, Disp: 6 Tab, Rfl: 0    peg 400-propylene glycol (SYSTANE, PROPYLENE GLYCOL,) 0.4-0.3 % drop, Apply 1 Drop to eye., Disp: , Rfl:     hydroCHLOROthiazide (HYDRODIURIL) 12.5 mg tablet, TAKE 1 TABLET BY MOUTH EVERY MORNING, Disp: , Rfl: 1    VIT A/VIT C/VIT E/ZINC/COPPER (PRESERVISION AREDS PO), Take  by mouth., Disp: , Rfl:     methIMAzole (TAPAZOLE) 5 mg tablet, 5mg (1 tablet) daily  Indications: hyperthyroidism, Disp: 90 Tab, Rfl: 3    irbesartan (AVAPRO) 300 mg tablet, TAKE 1 TABLET BY MOUTH EVERY DAY FOR BLOOD PRESSURE, Disp: , Rfl: 3    cholecalciferol, VITAMIN D3, (VITAMIN D3) 5,000 unit tab tablet, Take  by mouth daily. , Disp: , Rfl:     aspirin delayed-release 81 mg tablet, Take  by mouth daily. , Disp: , Rfl:     B.infantis-B.ani-B.long-B.bifi 10-15 mg TbEC, Take  by mouth two (2) times a day., Disp: , Rfl:     amLODIPine (NORVASC) 10 mg tablet, Take  by mouth daily. , Disp: , Rfl:     atorvastatin (LIPITOR) 10 mg tablet, Take  by mouth daily. , Disp: , Rfl:     tamsulosin (FLOMAX) 0.4 mg capsule, Take 0.4 mg by mouth daily. , Disp: , Rfl:     SOCIAL HISTORY:   Social History     Social History    Marital status:      Spouse name: N/A    Number of children: N/A    Years of education: N/A     Occupational History    Not on file. Social History Main Topics    Smoking status: Never Smoker    Smokeless tobacco: Never Used    Alcohol use 1.0 oz/week     2 Standard drinks or equivalent per week      Comment: social    Drug use: Not on file    Sexual activity: Not on file     Other Topics Concern    Not on file     Social History Narrative       FAMILY HISTORY:  Family History   Problem Relation Age of Onset    Hypertension Mother     Stroke Mother     Hypertension Father     Stroke Father     Breast Cancer Sister        REVIEW OF SYSTEMS: Complete ROS assessed and noted for that which is described above, all else are negative.   Eyes: normal  ENT: normal  CVS: normal  Resp: normal  GI: normal  : normal  GYN: normal  Endocrine: normal  Integument: normal  Musculoskeletal: normal  Neuro: normal  Psych: normal      PHYSICAL EXAMINATION:    VITAL SIGNS:  There were no vitals taken for this visit. GENERAL: NCAT, Sitting comfortably, NAD  EYES: EOMI, non-icteric, no proptosis  Ear/Nose/Throat: NCAT, no inflammation, thyroid gland not appreciably enlarged  LYMPH NODES: No LAD  CARDIOVASCULAR: S1 S2, RRR, No murmur, 2+ radial pulses  RESPIRATORY: CTA b/l, no wheeze/rales  GASTROINTESTINAL: soft, NT, ND,  MUSCULOSKELETAL: Normal ROM, no atrophy  SKIN: warm, no edema/rash/ or other skin changes  NEUROLOGIC: 5/5 power all extremities, no tremors, AAOx3  PSYCHIATRIC: Normal affect, Normal insight and judgement       REVIEW OF LABORATORY AND RADIOLOGY DATA:   Labs and documentation have been reviewed as described above. ASSESSMENT AND PLAN:   Raul Barbosa is a 81 y.o. male with a PMHx as noted above who presents to the endocrinology clinic for evaluation of hyperthyroidism. Subclinical hyperthyroidism  Goiter    Clinically stable with methimazole 5mg daily,  Thyroid labs today, will taper to 1/2 tablet (2.5mg) once daily if stable,   Plan for a 4 month f/u visit, can be modified,    Frida MEI.  [address] [facility] Endocrinology

## 2018-08-25 LAB
T4 FREE SERPL-MCNC: 0.97 NG/DL (ref 0.82–1.77)
TSH SERPL DL<=0.005 MIU/L-ACNC: 1.59 UIU/ML (ref 0.45–4.5)

## 2018-08-27 NOTE — PROGRESS NOTES
Patients thyroid hormone levels are stable however his TSH level has come down from 2.8 to 1.5. This suggests that cutting back on his methimazole dose at this time will likely cause his thyroid hormone levels to rise too high. We will instead continue the 5mg tablet that he is taking each day as it is keeping a control on his thyroid levels. Juno Arreola, would you mind letting Mr. Elías Bar know this ? Thank you ! Balta Mcgowan.  [facility] Endocrinology  [address] [place] [address]

## 2018-11-08 RX ORDER — METHIMAZOLE 5 MG/1
TABLET ORAL
Qty: 90 TAB | Refills: 3 | Status: SHIPPED | OUTPATIENT
Start: 2018-11-08 | End: 2019-10-24 | Stop reason: SDUPTHER

## 2019-01-04 ENCOUNTER — OFFICE VISIT (OUTPATIENT)
Dept: ENDOCRINOLOGY | Age: 84
End: 2019-01-04

## 2019-01-04 VITALS
DIASTOLIC BLOOD PRESSURE: 76 MMHG | HEART RATE: 70 BPM | SYSTOLIC BLOOD PRESSURE: 133 MMHG | WEIGHT: 172.6 LBS | HEIGHT: 67 IN | BODY MASS INDEX: 27.09 KG/M2

## 2019-01-04 DIAGNOSIS — E05.90 SUBCLINICAL HYPERTHYROIDISM: Primary | ICD-10-CM

## 2019-01-04 DIAGNOSIS — Z12.5 ENCOUNTER FOR SCREENING FOR MALIGNANT NEOPLASM OF PROSTATE: ICD-10-CM

## 2019-01-04 NOTE — PROGRESS NOTES
CHIEF COMPLAINT: f/u evaluation for hyperthyroidism. HISTORY OF PRESENT ILLNESS:   Diamond Bardales is a 81 y.o. male with a PMHx as noted below who presents to the endocrinology clinic for f/u evaluation of hyperthyroidism. Patient previously presented with labs suggestive of subclinical hyperthyroidism. Thyroid US did not reveal any nodules, just small cysts, but gland was enlarged at 6cm. Uptake and Scan was normal  Patient was trialled on low dose (5mg) of methimazole and eventually had a good response to therapy.   Methimazole started 12/19/16    Interval history:  Previously continued on 5mg of methimazole once daily,  Taking as directed, not missing doses,  Denies symptoms of hyper or hypothyroidism,  Has follow up with neurology in the near future, reports no sign of dementia or stroke,  Still has some issues with short term memory,  No recent labs for review,  Review of most recent thyroid function:  Lab Results   Component Value Date    TSH 1.590 08/24/2018    TSH 2.860 01/24/2018    TSH 2.460 05/26/2017    FT4 0.97 08/24/2018    FT4 0.92 01/24/2018    FT4 0.89 05/26/2017    T3LT 117 05/26/2017    T3LT 105 04/06/2017    T3LT 110 02/27/2017    TSILT 25 06/14/2016      TSILT = Thyroid stimulating antibodies  TMCLT = TPO antibodies  T3LT = Total T3 levels        PAST MEDICAL/SURGICAL HISTORY:   Past Medical History:   Diagnosis Date    Acute gouty arthropathy     Benign hypertensive heart disease without heart failure     Hypertrophy of prostate without urinary obstruction and other lower urinary tract symptoms (LUTS)     Memory difficulties     Other and unspecified hyperlipidemia      Past Surgical History:   Procedure Laterality Date    HX CATARACT REMOVAL  01/01/2008    Rt    HX COLONOSCOPY  7/15    [place], q 10 yrs    HX HERNIA REPAIR  01/01/1980       ALLERGIES:   No Known Allergies    MEDICATIONS ON ADMISSION:     Current Outpatient Medications:     methIMAzole (TAPAZOLE) 5 mg tablet, TAKE 1 TABLET BY MOUTH EVERY DAY, Disp: 90 Tab, Rfl: 3    hydroCHLOROthiazide (HYDRODIURIL) 25 mg tablet, TAKE 1 TABLET BY MOUTH EVERY DAY, Disp: , Rfl: 3    peg 400-propylene glycol (SYSTANE, PROPYLENE GLYCOL,) 0.4-0.3 % drop, Apply 1 Drop to eye. Several times per day, Disp: , Rfl:     VIT A/VIT C/VIT E/ZINC/COPPER (PRESERVISION AREDS PO), Take  by mouth., Disp: , Rfl:     irbesartan (AVAPRO) 300 mg tablet, TAKE 1 TABLET BY MOUTH EVERY DAY FOR BLOOD PRESSURE, Disp: , Rfl: 3    cholecalciferol, VITAMIN D3, (VITAMIN D3) 5,000 unit tab tablet, Take  by mouth daily. , Disp: , Rfl:     aspirin delayed-release 81 mg tablet, Take  by mouth every fourty-eight (48) hours. , Disp: , Rfl:     B.infantis-B.ani-B.long-B.bifi 10-15 mg TbEC, Take  by mouth daily. , Disp: , Rfl:     amLODIPine (NORVASC) 10 mg tablet, Take  by mouth daily. , Disp: , Rfl:     atorvastatin (LIPITOR) 10 mg tablet, Take  by mouth daily. , Disp: , Rfl:     tamsulosin (FLOMAX) 0.4 mg capsule, Take 0.4 mg by mouth daily. , Disp: , Rfl:     hydroCHLOROthiazide (HYDRODIURIL) 12.5 mg tablet, TAKE 1 TABLET BY MOUTH EVERY MORNING, Disp: , Rfl: 1    SOCIAL HISTORY:   Social History     Socioeconomic History    Marital status:      Spouse name: Not on file    Number of children: Not on file    Years of education: Not on file    Highest education level: Not on file   Social Needs    Financial resource strain: Not on file    Food insecurity - worry: Not on file    Food insecurity - inability: Not on file   [employer] needs - medical: Not on file   [employer] needs - non-medical: Not on file   Occupational History    Not on file   Tobacco Use    Smoking status: Never Smoker    Smokeless tobacco: Never Used   Substance and Sexual Activity    Alcohol use:  Yes     Alcohol/week: 1.0 oz     Types: 2 Standard drinks or equivalent per week     Comment: social    Drug use: Not on file    Sexual activity: Not on file   Other Topics Concern    Not on file   Social History Narrative    Not on file       FAMILY HISTORY:  Family History   Problem Relation Age of Onset    Hypertension Mother     Stroke Mother     Hypertension Father     Stroke Father     Breast Cancer Sister        REVIEW OF SYSTEMS: Complete ROS assessed and noted for that which is described above, all else are negative. Eyes: normal  ENT: normal  CVS: normal  Resp: normal  GI: normal  : normal  GYN: normal  Endocrine: normal  Integument: normal  Musculoskeletal: normal  Neuro: normal  Psych: normal      PHYSICAL EXAMINATION:    VITAL SIGNS:  Visit Vitals  /76 (BP 1 Location: Right arm, BP Patient Position: Sitting)   Pulse 70   Ht 5' 7\" (1.702 m)   Wt 172 lb 9.6 oz (78.3 kg)   BMI 27.03 kg/m²       GENERAL: NCAT, Sitting comfortably, NAD  EYES: EOMI, non-icteric, no proptosis  Ear/Nose/Throat: NCAT, no inflammation, thyroid gland not appreciably enlarged  LYMPH NODES: No LAD  CARDIOVASCULAR: S1 S2, RRR, No murmur, 2+ radial pulses  RESPIRATORY: CTA b/l, no wheeze/rales  GASTROINTESTINAL: soft, NT, ND,  MUSCULOSKELETAL: Normal ROM, no atrophy  SKIN: warm, no edema/rash/ or other skin changes  NEUROLOGIC: 5/5 power all extremities, no tremors, AAOx3  PSYCHIATRIC: Normal affect, Normal insight and judgement     REVIEW OF LABORATORY AND RADIOLOGY DATA:   Labs and documentation have been reviewed as described above. ASSESSMENT AND PLAN:   Kezia Nur is a 81 y.o. male with a PMHx as noted above who presents to the endocrinology clinic for evaluation of hyperthyroidism. Subclinical hyperthyroidism  Goiter    Clinically stable with methimazole 5mg daily,  Thyroid labs today, will discuss results and plan when received,  Plan for a 4 month f/u visit, can be modified,    Because of weakness and ongoing fatigue, he is interested to check his testosterone panel, and we can treat him with a little if he is symptomatic.  Labs ordered, he can do at his convenience, around 8 AM at a lab near home. Lab slip provided. Stefan Johnson.  [facility]

## 2019-01-04 NOTE — PATIENT INSTRUCTIONS
Thyroid levels today,     Continue the 5 mg of methimazole once daily,     Plan for follow up in about 4 months,     Carolyn MEI.  [address] Endocrinology  [address] [place] [address]

## 2019-01-05 LAB
T3 SERPL-MCNC: 122 NG/DL (ref 71–180)
T4 FREE SERPL-MCNC: 1.12 NG/DL (ref 0.82–1.77)
TSH SERPL DL<=0.005 MIU/L-ACNC: 1.4 UIU/ML (ref 0.45–4.5)

## 2019-01-08 ENCOUNTER — TELEPHONE (OUTPATIENT)
Dept: ENDOCRINOLOGY | Age: 84
End: 2019-01-08

## 2019-01-08 NOTE — TELEPHONE ENCOUNTER
----- Message from Kristine Rojo MD sent at 1/7/2019 11:14 PM EST -----  Thyroid levels are stable, continue 5mg of methimazole once daily,     Ana Maria LAN  39 [facility] Endocrinology  [address] [place] [address]

## 2019-01-08 NOTE — TELEPHONE ENCOUNTER
I called Mr. Rolanda Whitmore and spoke with his wife and relayed the message from Dr. Mela Douglas. She understood this information and will pass it on to . Rolanda Whitmore.   Master Juárez

## 2019-01-08 NOTE — PROGRESS NOTES
Thyroid levels are stable, continue 5mg of methimazole once daily,     Todd Contreras.  39 [facility] Endocrinology  [address] [place] [address]

## 2019-02-26 LAB
ERYTHROCYTE [DISTWIDTH] IN BLOOD BY AUTOMATED COUNT: 13.4 % (ref 12.3–15.4)
FSH SERPL-ACNC: 9.6 MIU/ML (ref 1.5–12.4)
HCT VFR BLD AUTO: 40.9 % (ref 37.5–51)
HGB BLD-MCNC: 13.7 G/DL (ref 13–17.7)
LH SERPL-ACNC: 8 MIU/ML (ref 1.7–8.6)
MCH RBC QN AUTO: 31.9 PG (ref 26.6–33)
MCHC RBC AUTO-ENTMCNC: 33.5 G/DL (ref 31.5–35.7)
MCV RBC AUTO: 95 FL (ref 79–97)
PLATELET # BLD AUTO: 218 X10E3/UL (ref 150–379)
PROLACTIN SERPL-MCNC: 25.1 NG/ML (ref 4–15.2)
PSA SERPL-MCNC: 0.9 NG/ML (ref 0–4)
RBC # BLD AUTO: 4.3 X10E6/UL (ref 4.14–5.8)
TESTOST FREE SERPL-MCNC: 9.5 PG/ML (ref 6.6–18.1)
TESTOST SERPL-MCNC: 639 NG/DL (ref 264–916)
WBC # BLD AUTO: 3.3 X10E3/UL (ref 3.4–10.8)

## 2019-02-26 NOTE — PROGRESS NOTES
Sania,  Can you let Mr Sumeet Lu know that both his Total and Free testosterone levels are both very normal and very healthy. No role for testosterone therapy. His prolactin level, a pituitary hormone that can interfere with testosterone secretion, is elevated slightly but can be repeated in the future. Thanks, !

## 2019-02-28 ENCOUNTER — TELEPHONE (OUTPATIENT)
Dept: ENDOCRINOLOGY | Age: 84
End: 2019-02-28

## 2019-02-28 NOTE — TELEPHONE ENCOUNTER
I called Mr. Mercedes Go and relayed the message from  Dr. Arianne Barba. He understood this information.   Fior Lindsey

## 2019-02-28 NOTE — TELEPHONE ENCOUNTER
----- Message from Rosangela Cavazos MD sent at 2/26/2019  2:27 PM EST -----  Liset Mendoza,  Can you let Mr Celeste Pacheco know that both his Total and Free testosterone levels are both very normal and very healthy. No role for testosterone therapy. His prolactin level, a pituitary hormone that can interfere with testosterone secretion, is elevated slightly but can be repeated in the future. Thanks, !

## 2019-04-11 ENCOUNTER — OFFICE VISIT (OUTPATIENT)
Dept: ENDOCRINOLOGY | Age: 84
End: 2019-04-11

## 2019-04-11 VITALS
WEIGHT: 175 LBS | HEART RATE: 77 BPM | BODY MASS INDEX: 27.47 KG/M2 | HEIGHT: 67 IN | DIASTOLIC BLOOD PRESSURE: 69 MMHG | SYSTOLIC BLOOD PRESSURE: 134 MMHG

## 2019-04-11 DIAGNOSIS — E05.90 SUBCLINICAL HYPERTHYROIDISM: Primary | ICD-10-CM

## 2019-04-11 DIAGNOSIS — E22.1 HYPERPROLACTINEMIA (HCC): ICD-10-CM

## 2019-04-11 RX ORDER — VALSARTAN 320 MG/1
TABLET ORAL
Status: ON HOLD | COMMUNITY
Start: 2019-04-09 | End: 2021-05-05

## 2019-04-11 NOTE — PROGRESS NOTES
CHIEF COMPLAINT: f/u evaluation for hyperthyroidism. HISTORY OF PRESENT ILLNESS:   Cheryl Howard is a 81 y.o. male with a PMHx as noted below who presents to the endocrinology clinic for f/u evaluation of hyperthyroidism. Patient previously presented with labs suggestive of subclinical hyperthyroidism. Thyroid US did not reveal any nodules, just small cysts, but gland was enlarged at 6cm. Uptake and Scan was normal  Patient was trialled on low dose (5mg) of methimazole and eventually had a good response to therapy.   Methimazole started 12/19/16    Interval history:  Continues on 5mg of methimazole once daily,  Taking as directed, not missing doses,  Denies symptoms of hyper or hypothyroidism,  We checked his testosterone level per his request, and was normal,  His prolactin which was checked however was slightly elevated,  No recent labs for review, will check today,  Review of most recent thyroid function:  Lab Results   Component Value Date    TSH 1.400 01/04/2019    TSH 1.590 08/24/2018    TSH 2.860 01/24/2018    FT4 1.12 01/04/2019    FT4 0.97 08/24/2018    FT4 0.92 01/24/2018    T3LT 122 01/04/2019    T3LT 117 05/26/2017    T3LT 105 04/06/2017    TSILT 25 06/14/2016      TSILT = Thyroid stimulating antibodies  TMCLT = TPO antibodies  T3LT = Total T3 levels        PAST MEDICAL/SURGICAL HISTORY:   Past Medical History:   Diagnosis Date    Acute gouty arthropathy     Benign hypertensive heart disease without heart failure     Hypertrophy of prostate without urinary obstruction and other lower urinary tract symptoms (LUTS)     Memory difficulties     Other and unspecified hyperlipidemia      Past Surgical History:   Procedure Laterality Date    HX CATARACT REMOVAL  01/01/2008    Rt    HX COLONOSCOPY  7/15    [place], q 10 yrs    HX HERNIA REPAIR  01/01/1980       ALLERGIES:   No Known Allergies    MEDICATIONS ON ADMISSION:     Current Outpatient Medications:     valsartan (DIOVAN) 320 mg tablet, , Disp: , Rfl:     methIMAzole (TAPAZOLE) 5 mg tablet, TAKE 1 TABLET BY MOUTH EVERY DAY, Disp: 90 Tab, Rfl: 3    hydroCHLOROthiazide (HYDRODIURIL) 25 mg tablet, TAKE 1 TABLET BY MOUTH EVERY DAY, Disp: , Rfl: 3    peg 400-propylene glycol (SYSTANE, PROPYLENE GLYCOL,) 0.4-0.3 % drop, Apply 1 Drop to eye. Several times per day, Disp: , Rfl:     VIT A/VIT C/VIT E/ZINC/COPPER (PRESERVISION AREDS PO), Take  by mouth two (2) times a day., Disp: , Rfl:     irbesartan (AVAPRO) 300 mg tablet, TAKE 1 TABLET BY MOUTH EVERY DAY FOR BLOOD PRESSURE, Disp: , Rfl: 3    cholecalciferol, VITAMIN D3, (VITAMIN D3) 5,000 unit tab tablet, Take  by mouth daily. , Disp: , Rfl:     aspirin delayed-release 81 mg tablet, Take  by mouth every fourty-eight (48) hours. , Disp: , Rfl:     B.infantis-B.ani-B.long-B.bifi 10-15 mg TbEC, Take  by mouth daily. , Disp: , Rfl:     amLODIPine (NORVASC) 10 mg tablet, Take  by mouth daily. , Disp: , Rfl:     atorvastatin (LIPITOR) 10 mg tablet, Take  by mouth daily. , Disp: , Rfl:     hydroCHLOROthiazide (HYDRODIURIL) 12.5 mg tablet, TAKE 1 TABLET BY MOUTH EVERY MORNING, Disp: , Rfl: 1    tamsulosin (FLOMAX) 0.4 mg capsule, Take 0.4 mg by mouth daily. , Disp: , Rfl:     SOCIAL HISTORY:   Social History     Socioeconomic History    Marital status:      Spouse name: Not on file    Number of children: Not on file    Years of education: Not on file    Highest education level: Not on file   Occupational History    Not on file   Social Needs    Financial resource strain: Not on file    Food insecurity:     Worry: Not on file     Inability: Not on file    Transportation needs:     Medical: Not on file     Non-medical: Not on file   Tobacco Use    Smoking status: Never Smoker    Smokeless tobacco: Never Used   Substance and Sexual Activity    Alcohol use:  Yes     Alcohol/week: 1.0 oz     Types: 2 Standard drinks or equivalent per week     Comment: social    Drug use: Not on file    Sexual activity: Not on file   Lifestyle    Physical activity:     Days per week: Not on file     Minutes per session: Not on file    Stress: Not on file   Relationships    Social connections:     Talks on phone: Not on file     Gets together: Not on file     Attends Caodaism service: Not on file     Active member of club or organization: Not on file     Attends meetings of clubs or organizations: Not on file     Relationship status: Not on file    Intimate partner violence:     Fear of current or ex partner: Not on file     Emotionally abused: Not on file     Physically abused: Not on file     Forced sexual activity: Not on file   Other Topics Concern    Not on file   Social History Narrative    Not on file       FAMILY HISTORY:  Family History   Problem Relation Age of Onset    Hypertension Mother     Stroke Mother     Hypertension Father     Stroke Father     Breast Cancer Sister        REVIEW OF SYSTEMS: Complete ROS assessed and noted for that which is described above, all else are negative.   Eyes: normal  ENT: normal  CVS: normal  Resp: normal  GI: normal  : normal  GYN: normal  Endocrine: normal  Integument: normal  Musculoskeletal: normal  Neuro: normal  Psych: normal      PHYSICAL EXAMINATION:    VITAL SIGNS:  Visit Vitals  /69 (BP 1 Location: Left arm, BP Patient Position: Sitting)   Pulse 77   Ht 5' 7\" (1.702 m)   Wt 175 lb (79.4 kg)   BMI 27.41 kg/m²       GENERAL: NCAT, Sitting comfortably, NAD  EYES: EOMI, non-icteric, no proptosis  Ear/Nose/Throat: NCAT, no inflammation, thyroid gland not appreciably enlarged  LYMPH NODES: No LAD  CARDIOVASCULAR: S1 S2, RRR, No murmur, 2+ radial pulses  RESPIRATORY: CTA b/l, no wheeze/rales  GASTROINTESTINAL: soft, NT, ND,  MUSCULOSKELETAL: Normal ROM, no atrophy  SKIN: warm, no edema/rash/ or other skin changes  NEUROLOGIC: 5/5 power all extremities, no tremors, AAOx3  PSYCHIATRIC: Normal affect, Normal insight and judgement     REVIEW OF LABORATORY AND RADIOLOGY DATA:   Labs and documentation have been reviewed as described above. ASSESSMENT AND PLAN:   Cheryl Howard is a 81 y.o. male with a PMHx as noted above who presents to the endocrinology clinic for evaluation of hyperthyroidism. Subclinical hyperthyroidism  Hyperprolactinemia (new)    Hyperthyroidism  Continue methimazole 5mg daily,  Thyroid labs today, will discuss results and plan when received,    Prolactin  Not clear why this was elevated previously, possibly idiopathic  His testosterone level was stable  Will recheck today, currently asymptomatic    RTC in 6 months,     Moy Notice T.  [facility]

## 2019-04-11 NOTE — PATIENT INSTRUCTIONS
Thyroid levels today,     Continue the 5 mg of methimazole once daily,     Plan for follow up in about 6 months,     Amelia MEI.  [address] [facility]  [address] [place] [address]

## 2019-04-12 LAB
PROLACTIN SERPL-MCNC: 10.4 NG/ML (ref 4–15.2)
T4 FREE SERPL-MCNC: 1.2 NG/DL (ref 0.82–1.77)
TSH SERPL DL<=0.005 MIU/L-ACNC: 0.6 UIU/ML (ref 0.45–4.5)

## 2019-04-12 NOTE — PROGRESS NOTES
Thyroid levels look good, Prolactin back to normal,   Unable to reach him  by phone, Edward Jordan could you notify him,    Thanks,    Ruth Leon.  [address] Endocrinology  8 [facility]

## 2019-04-15 ENCOUNTER — TELEPHONE (OUTPATIENT)
Dept: ENDOCRINOLOGY | Age: 84
End: 2019-04-15

## 2019-04-15 NOTE — TELEPHONE ENCOUNTER
Attempted to call Deon Gera Garcia and reached a voice mail. I left a message asking him to give me a call back.   Darron Foss

## 2019-04-15 NOTE — TELEPHONE ENCOUNTER
----- Message from Loli Easton MD sent at 4/12/2019  4:56 PM EDT -----  Thyroid levels look good, Prolactin back to normal,   Unable to reach him  by phone, Alicia Fowler could you notify him,    Thanks,    Tessie Cheng.  39 [facility] End[facility]  [address] [place] [address]

## 2019-04-16 NOTE — TELEPHONE ENCOUNTER
I called Deon Beverly Bundy and relayed the message from Dr. Marbin Baird. He understood this information.   Tomas Marmolejo

## 2019-10-17 ENCOUNTER — TELEPHONE (OUTPATIENT)
Dept: ENDOCRINOLOGY | Age: 84
End: 2019-10-17

## 2019-10-17 NOTE — TELEPHONE ENCOUNTER
Mr. Anh Andre had missed a recent appointment,   His thyroid levels were abnormal,   TSH was low at 0.01 though his thyroid level was normal,  We need to know if he had missed any of his methimazole tablets,  Also if he has had symptoms of palpitations/tremors,  Previously we had him at 5mg once daily. Thanks,     Jennifer Robertson.  39 [facility] Endocrin[facility]  [address] [place] [address]

## 2019-10-18 ENCOUNTER — TELEPHONE (OUTPATIENT)
Dept: ENDOCRINOLOGY | Age: 84
End: 2019-10-18

## 2019-10-18 NOTE — TELEPHONE ENCOUNTER
----- Message from [employer] sent at 10/18/2019  9:40 AM EDT -----  Regarding: /Telephone  Patient return call    Caller's first and last name and relationship (if not the patient):      Best contact number(s): [phone]      Whose call is being returned:office      Details to clarify the request: Pt called requesting a call back from a miss call      [employer]

## 2019-10-23 NOTE — TELEPHONE ENCOUNTER
Joann Poon, previously nobody could reach patient about the following message, could you try reach him, thanks !    ----    Mr. Jayro Cheema had missed a recent appointment,   His thyroid levels were abnormal,   TSH was low at 0.01 though his thyroid level was normal,  We need to know if he had missed any of his methimazole tablets,  Also if he has had symptoms of palpitations/tremors,  Previously we had him at 5mg once daily.

## 2019-10-24 RX ORDER — METHIMAZOLE 5 MG/1
TABLET ORAL
Qty: 90 TAB | Refills: 3 | Status: SHIPPED | OUTPATIENT
Start: 2019-10-24 | End: 2019-12-05 | Stop reason: SDUPTHER

## 2019-10-24 NOTE — TELEPHONE ENCOUNTER
I called Mr. Nicole Lieberman and he said he hasn't taken his Methimazole in over a month because he ran out. I told him that in the future, when he starts to run low to let me know and I will do a refill for him so this doesn't happen again. He understood this. I also transferred him to the  to make a follow up appointment.   Rosie Solares

## 2019-12-02 ENCOUNTER — OFFICE VISIT (OUTPATIENT)
Dept: ENDOCRINOLOGY | Age: 84
End: 2019-12-02

## 2019-12-02 VITALS
HEIGHT: 67 IN | BODY MASS INDEX: 27 KG/M2 | SYSTOLIC BLOOD PRESSURE: 125 MMHG | WEIGHT: 172 LBS | DIASTOLIC BLOOD PRESSURE: 65 MMHG | HEART RATE: 79 BPM

## 2019-12-02 DIAGNOSIS — E05.90 SUBCLINICAL HYPERTHYROIDISM: Primary | ICD-10-CM

## 2019-12-02 NOTE — PROGRESS NOTES
CHIEF COMPLAINT: f/u evaluation for hyperthyroidism. HISTORY OF PRESENT ILLNESS:   Kinga Stapleton is a 81 y.o. male with a PMHx as noted below who presents to the endocrinology clinic for f/u evaluation of hyperthyroidism. Patient previously presented with labs suggestive of subclinical hyperthyroidism. Thyroid US did not reveal any nodules, just small cysts, but gland was enlarged at 6cm. Uptake and Scan was normal  Patient was trialled on low dose (5mg) of methimazole and eventually had a good response to therapy.   Methimazole started 12/19/16    Interval history:  Patient had missed a visit recently,   I had reviewed his labs and noted his TSH was abnormal,  We inquired with him and he described not taking methimazole x1 month due to running out,  He has since restarted his methimazole 5mg once daily,  No recent labs for review,  Denies symptoms of hyperthyroidism or hypothyroidism at this time,   Prolactin level that was prev elevated had resolved, no need to repeat,  Review of most recent thyroid function:  Lab Results   Component Value Date    TSH 0.01 (L) 09/30/2019    TSH 0.601 04/11/2019    TSH 1.400 01/04/2019    FT4 1.2 09/30/2019    FT4 1.20 04/11/2019    FT4 1.12 01/04/2019    T3LT 122 01/04/2019    T3LT 117 05/26/2017    T3LT 105 04/06/2017    TSILT 25 06/14/2016      TSILT = Thyroid stimulating antibodies  TMCLT = TPO antibodies  T3LT = Total T3 levels    PAST MEDICAL/SURGICAL HISTORY:   Past Medical History:   Diagnosis Date    Acute gouty arthropathy     Benign hypertensive heart disease without heart failure     Hypertrophy of prostate without urinary obstruction and other lower urinary tract symptoms (LUTS)     Memory difficulties     Other and unspecified hyperlipidemia      Past Surgical History:   Procedure Laterality Date    HX CATARACT REMOVAL  01/01/2008    Rt    HX COLONOSCOPY  7/15    [place], q 10 yrs    HX HERNIA REPAIR  01/01/1980       ALLERGIES:   No Known Allergies    MEDICATIONS ON ADMISSION:     Current Outpatient Medications:     tadalafil (CIALIS) 10 mg tablet, TAKE 1 TABLET BY MOUTH ONCE DAILY, Disp: , Rfl: 1    hydroCHLOROthiazide (HYDRODIURIL) 25 mg tablet, TAKE 1 TABLET BY MOUTH EVERY DAY, Disp: , Rfl: 0    methIMAzole (TAPAZOLE) 5 mg tablet, TAKE 1 TABLET BY MOUTH EVERY DAY, Disp: 90 Tab, Rfl: 3    valsartan (DIOVAN) 320 mg tablet, , Disp: , Rfl:     peg 400-propylene glycol (SYSTANE, PROPYLENE GLYCOL,) 0.4-0.3 % drop, Apply 1 Drop to eye. Several times per day, Disp: , Rfl:     VIT A/VIT C/VIT E/ZINC/COPPER (PRESERVISION AREDS PO), Take  by mouth two (2) times a day., Disp: , Rfl:     irbesartan (AVAPRO) 300 mg tablet, TAKE 1 TABLET BY MOUTH EVERY DAY FOR BLOOD PRESSURE, Disp: , Rfl: 3    cholecalciferol, VITAMIN D3, (VITAMIN D3) 5,000 unit tab tablet, Take  by mouth daily. , Disp: , Rfl:     aspirin delayed-release 81 mg tablet, Take  by mouth every fourty-eight (48) hours. , Disp: , Rfl:     B.infantis-B.ani-B.long-B.bifi 10-15 mg TbEC, Take  by mouth daily. , Disp: , Rfl:     amLODIPine (NORVASC) 10 mg tablet, Take  by mouth daily. , Disp: , Rfl:     atorvastatin (LIPITOR) 10 mg tablet, Take  by mouth daily. , Disp: , Rfl:     tamsulosin (FLOMAX) 0.4 mg capsule, Take 0.4 mg by mouth daily. , Disp: , Rfl:     cetirizine (ZYRTEC) 10 mg tablet, TAKE 1 TABLET BY MOUTH ONCE DAILY FOR 7 DAYS AND THEN AS NEEDED, Disp: 30 Tab, Rfl: 5    SOCIAL HISTORY:   Social History     Socioeconomic History    Marital status:      Spouse name: Not on file    Number of children: Not on file    Years of education: Not on file    Highest education level: Not on file   Occupational History    Not on file   Social Needs    Financial resource strain: Not on file    Food insecurity:     Worry: Not on file     Inability: Not on file    Transportation needs:     Medical: Not on file     Non-medical: Not on file   Tobacco Use    Smoking status: Never Smoker    Smokeless tobacco: Never Used   Substance and Sexual Activity    Alcohol use: Yes     Alcohol/week: 1.7 standard drinks     Types: 2 Standard drinks or equivalent per week     Comment: social    Drug use: Not on file    Sexual activity: Not on file   Lifestyle    Physical activity:     Days per week: Not on file     Minutes per session: Not on file    Stress: Not on file   Relationships    Social connections:     Talks on phone: Not on file     Gets together: Not on file     Attends Religion service: Not on file     Active member of club or organization: Not on file     Attends meetings of clubs or organizations: Not on file     Relationship status: Not on file    Intimate partner violence:     Fear of current or ex partner: Not on file     Emotionally abused: Not on file     Physically abused: Not on file     Forced sexual activity: Not on file   Other Topics Concern    Not on file   Social History Narrative    Not on file       FAMILY HISTORY:  Family History   Problem Relation Age of Onset    Hypertension Mother     Stroke Mother     Hypertension Father     Stroke Father     Breast Cancer Sister        REVIEW OF SYSTEMS: Complete ROS assessed and noted for that which is described above, all else are negative.   Eyes: normal  ENT: normal  CVS: normal  Resp: normal  GI: normal  : normal  GYN: normal  Endocrine: normal  Integument: normal  Musculoskeletal: normal  Neuro: normal  Psych: normal      PHYSICAL EXAMINATION:    VITAL SIGNS:  Visit Vitals  /65 (BP 1 Location: Left arm, BP Patient Position: Sitting)   Pulse 79   Ht 5' 7\" (1.702 m)   Wt 172 lb (78 kg)   BMI 26.94 kg/m²       GENERAL: NCAT, Sitting comfortably, NAD  EYES: EOMI, non-icteric, no proptosis  Ear/Nose/Throat: NCAT, no inflammation, thyroid gland not appreciably enlarged  LYMPH NODES: No LAD  CARDIOVASCULAR: S1 S2, RRR, No murmur, 2+ radial pulses  RESPIRATORY: CTA b/l, no wheeze/rales  GASTROINTESTINAL: soft, NT, ND,  MUSCULOSKELETAL: Normal ROM, no atrophy  SKIN: warm, no edema/rash/ or other skin changes  NEUROLOGIC: 5/5 power all extremities, no tremors, AAOx3  PSYCHIATRIC: Normal affect, Normal insight and judgement     REVIEW OF LABORATORY AND RADIOLOGY DATA:   Labs and documentation have been reviewed as described above. ASSESSMENT AND PLAN:   Dandre Fregoso is a 81 y.o. male with a PMHx as noted above who presents to the endocrinology clinic for evaluation of hyperthyroidism. Subclinical hyperthyroidism    Hyperthyroidism:   TSH low while he was off his methimazole thus we know that he still requires methimazole,  Continue methimazole 5mg daily,  Thyroid labs today, will discuss results and plan when result reviewed,    RTC in 6 months,     Meena MEI.  [address] [facility]

## 2019-12-02 NOTE — PATIENT INSTRUCTIONS
Thyroid levels today,  
 
Continue the 5 mg of methimazole once daily,  
 
Plan for follow up in about 6 months,  
 
Nena MEI. [address] [facility] 8 Mara Lopez

## 2019-12-03 LAB
T4 FREE SERPL-MCNC: 1.23 NG/DL (ref 0.82–1.77)
TSH SERPL DL<=0.005 MIU/L-ACNC: 0.02 UIU/ML (ref 0.45–4.5)

## 2019-12-04 ENCOUNTER — TELEPHONE (OUTPATIENT)
Dept: ENDOCRINOLOGY | Age: 84
End: 2019-12-04

## 2019-12-04 NOTE — TELEPHONE ENCOUNTER
----- Message from Paulina Russ MD sent at 12/4/2019  2:52 PM EST -----  Arlene Mckoy, please let patient know to increase methimazole as follows. Two full tablets daily for 4 weeks, then reduce back down to 1 tablet daily thereafter. Thanks,     Krista Santoro.  39 [facility] End[facility]  [address] [place] [address]

## 2019-12-04 NOTE — TELEPHONE ENCOUNTER
I attempted to call Mr. Ayan Hernandez and reached his mother in law. She said he wasn't home but she would give him a message to call me tomorrow.   Andrew Cyr

## 2019-12-04 NOTE — PROGRESS NOTES
Will Walton, please let patient know to increase methimazole as follows. Two full tablets daily for 4 weeks, then reduce back down to 1 tablet daily thereafter. Thanks,     Yoanna Valera.  [address]

## 2019-12-05 RX ORDER — METHIMAZOLE 5 MG/1
TABLET ORAL
Qty: 180 TAB | Refills: 3 | Status: SHIPPED | OUTPATIENT
Start: 2019-12-05 | End: 2021-07-09 | Stop reason: SDUPTHER

## 2019-12-05 NOTE — TELEPHONE ENCOUNTER
I called Mr. Reyna  back and relayed the message from Dr. Dave Lynn. He understood this information and said he would be running out of medication sooner with the increase. I spoke with Gwen Lynn and he will send in a new script reflecting this.   Daylin Morrison

## 2020-05-05 ENCOUNTER — VIRTUAL VISIT (OUTPATIENT)
Dept: ENDOCRINOLOGY | Age: 85
End: 2020-05-05

## 2020-05-05 DIAGNOSIS — E05.90 SUBCLINICAL HYPERTHYROIDISM: Primary | ICD-10-CM

## 2020-05-05 NOTE — PROGRESS NOTES
**DUE TO PANDEMIC AND HEALTH CONCERNS IN THE COMMUNITY, THIS PATIENT WAS EITHER ILL OR FOUND TO BE HIGH RISK FOR IN-PERSON EVALUATION WITHIN THE CLINIC. THE FOLLOWING IS A VIRTUAL VISIT VIA A TELEPHONE ENCOUNTER TO WHICH THE PATIENT AGREED. THE PURPOSE IS TO LIMIT INTERRUPTIONS IN HEALTHCARE AND TO PROVIDE FOR ONGOING URGENT NEEDS UNDER THE CURRENT CONDITIONS. THE PATIENT CONFIRMS THEY ARE AWARE OF THE LIMITATIONS OF THE TELEPHONE VISIT. CHIEF COMPLAINT: f/u evaluation for hyperthyroidism. HISTORY OF PRESENT ILLNESS:   Alison Barr is a 81 y.o. male with a PMHx as noted below who presents to the endocrinology clinic for f/u evaluation of hyperthyroidism. Patient previously presented with labs suggestive of subclinical hyperthyroidism. Thyroid US did not reveal any nodules, just small cysts, but gland was enlarged at 6cm. Uptake and Scan was normal  Patient was trialled on low dose (5mg) of methimazole and eventually had a good response to therapy. Methimazole started 12/19/16    Interval history:   On the prior visit patient had notably missed his methimazole x1 month,   His TSH was as low as 0.016 and so we knew he was still requiring treatment,  I had provided him with the following instructions:    Two full tablets daily for 4 weeks,    then reduce back down to 1 tablet daily thereafter  He has followed these instructions,  No recent labs for review,  Denies symptoms of hyperthyroidism or hypothyroidism at this time,   Prolactin level that was prev elevated resolved and no need to repeat as noted before,  Review of most recent thyroid function:  Lab Results   Component Value Date    TSH 0.016 (L) 12/02/2019    TSH 0.01 (L) 09/30/2019    TSH 0.601 04/11/2019    FT4 1.23 12/02/2019    FT4 1.2 09/30/2019    FT4 1.20 04/11/2019    T3LT 122 01/04/2019    T3LT 117 05/26/2017    T3LT 105 04/06/2017    TSILT 25 06/14/2016      TSILT = Thyroid stimulating antibodies  TMCLT = TPO antibodies  T3LT = Total T3 levels    PAST MEDICAL/SURGICAL HISTORY:   Past Medical History:   Diagnosis Date    Acute gouty arthropathy     Benign hypertensive heart disease without heart failure     Hypertrophy of prostate without urinary obstruction and other lower urinary tract symptoms (LUTS)     Memory difficulties     Other and unspecified hyperlipidemia      Past Surgical History:   Procedure Laterality Date    HX CATARACT REMOVAL  01/01/2008    Rt    HX COLONOSCOPY  7/15    [place], q 10 yrs    HX HERNIA REPAIR  01/01/1980       ALLERGIES:   No Known Allergies    MEDICATIONS ON ADMISSION:     Current Outpatient Medications:     methIMAzole (TAPAZOLE) 5 mg tablet, Take 2 tabs daily for 4 weeks, then reduce to 1 tab daily therafter, Disp: 180 Tab, Rfl: 3    hydroCHLOROthiazide (HYDRODIURIL) 25 mg tablet, TAKE 1 TABLET BY MOUTH EVERY DAY, Disp: , Rfl: 0    VIT A/VIT C/VIT E/ZINC/COPPER (PRESERVISION AREDS PO), Take  by mouth two (2) times a day., Disp: , Rfl:     irbesartan (AVAPRO) 300 mg tablet, TAKE 1 TABLET BY MOUTH EVERY DAY FOR BLOOD PRESSURE, Disp: , Rfl: 3    cholecalciferol, VITAMIN D3, (VITAMIN D3) 5,000 unit tab tablet, Take  by mouth daily. , Disp: , Rfl:     aspirin delayed-release 81 mg tablet, Take  by mouth every fourty-eight (48) hours. , Disp: , Rfl:     B.infantis-B.ani-B.long-B.bifi 10-15 mg TbEC, Take  by mouth daily. , Disp: , Rfl:     amLODIPine (NORVASC) 10 mg tablet, Take  by mouth daily. , Disp: , Rfl:     atorvastatin (LIPITOR) 10 mg tablet, Take  by mouth daily. , Disp: , Rfl:     tamsulosin (FLOMAX) 0.4 mg capsule, Take 0.4 mg by mouth daily. , Disp: , Rfl:     cetirizine (ZYRTEC) 10 mg tablet, TAKE 1 TABLET BY MOUTH ONCE DAILY FOR 7 DAYS AND THEN AS NEEDED, Disp: 30 Tab, Rfl: 5    tadalafil (CIALIS) 10 mg tablet, TAKE 1 TABLET BY MOUTH ONCE DAILY, Disp: , Rfl: 1    valsartan (DIOVAN) 320 mg tablet, , Disp: , Rfl:     peg 400-propylene glycol (SYSTANE, PROPYLENE GLYCOL,) 0.4-0.3 % drop, Apply 1 Drop to eye. Several times per day, Disp: , Rfl:     SOCIAL HISTORY:   Social History     Socioeconomic History    Marital status:      Spouse name: Not on file    Number of children: Not on file    Years of education: Not on file    Highest education level: Not on file   Occupational History    Not on file   Social Needs    Financial resource strain: Not on file    Food insecurity     Worry: Not on file     Inability: Not on file    Transportation needs     Medical: Not on file     Non-medical: Not on file   Tobacco Use    Smoking status: Never Smoker    Smokeless tobacco: Never Used   Substance and Sexual Activity    Alcohol use: Yes     Alcohol/week: 1.7 standard drinks     Types: 2 Standard drinks or equivalent per week     Comment: social    Drug use: Not on file    Sexual activity: Not on file   Lifestyle    Physical activity     Days per week: Not on file     Minutes per session: Not on file    Stress: Not on file   Relationships    Social connections     Talks on phone: Not on file     Gets together: Not on file     Attends Yarsanism service: Not on file     Active member of club or organization: Not on file     Attends meetings of clubs or organizations: Not on file     Relationship status: Not on file    Intimate partner violence     Fear of current or ex partner: Not on file     Emotionally abused: Not on file     Physically abused: Not on file     Forced sexual activity: Not on file   Other Topics Concern    Not on file   Social History Narrative    Not on file       FAMILY HISTORY:  Family History   Problem Relation Age of Onset    Hypertension Mother     Stroke Mother     Hypertension Father     Stroke Father     Breast Cancer Sister        REVIEW OF SYSTEMS: Complete ROS assessed and noted for that which is described above, all else are negative.   Eyes: normal  ENT: normal  CVS: normal  Resp: normal  GI: normal  : normal  GYN: normal  Endocrine: normal  Integument: normal  Musculoskeletal: normal  Neuro: normal  Psych: normal      PHYSICAL EXAMINATION:    VITAL SIGNS:  Telephone visit     REVIEW OF LABORATORY AND RADIOLOGY DATA:   Labs and documentation have been reviewed as described above. ASSESSMENT AND PLAN:   Nelson Solares is a 81 y.o. male with a PMHx as noted above who presents to the endocrinology clinic for evaluation of hyperthyroidism. Subclinical hyperthyroidism    Patient is feeling well and back on his previously dose which kept his thyroid levels stable. He has not desired to leave his home with the pandemic status, and labs near his home are reportedly locked down. In light of this, we will have him repeat his labs before next visit and give us a call if he runs into any issues relating potentially to his thyroid. Hyperthyroidism:   Continue methimazole 5mg daily,  Thyroid labs before next visit, ordered,    RTC in 4 months, Sept 15 at 11:30     15 minutes spent toward telephone visit today of which >50% of this time was spent in counseling and coordination of care. Rios Call.  [address] [facility]

## 2020-09-15 ENCOUNTER — VIRTUAL VISIT (OUTPATIENT)
Dept: ENDOCRINOLOGY | Age: 85
End: 2020-09-15
Payer: MEDICARE

## 2020-09-15 DIAGNOSIS — E05.90 SUBCLINICAL HYPERTHYROIDISM: Primary | ICD-10-CM

## 2020-09-15 PROCEDURE — 99213 OFFICE O/P EST LOW 20 MIN: CPT | Performed by: INTERNAL MEDICINE

## 2020-09-15 PROCEDURE — G8427 DOCREV CUR MEDS BY ELIG CLIN: HCPCS | Performed by: INTERNAL MEDICINE

## 2020-09-15 PROCEDURE — G8432 DEP SCR NOT DOC, RNG: HCPCS | Performed by: INTERNAL MEDICINE

## 2020-09-15 PROCEDURE — 1101F PT FALLS ASSESS-DOCD LE1/YR: CPT | Performed by: INTERNAL MEDICINE

## 2020-09-15 NOTE — PROGRESS NOTES
**DUE TO PANDEMIC AND HEALTH CONCERNS IN THE COMMUNITY, THIS PATIENT WAS EITHER ILL OR FOUND TO BE HIGH RISK FOR IN-PERSON EVALUATION WITHIN THE CLINIC. THE FOLLOWING IS A VIRTUAL TELEMEDICINE VIDEO ENCOUNTER VIA [facility], TO WHICH THE PATIENT AGREED. THE PURPOSE IS TO LIMIT INTERRUPTIONS IN HEALTHCARE AND TO PROVIDE FOR ONGOING URGENT NEEDS UNDER THE CURRENT CONDITIONS. CHIEF COMPLAINT: f/u evaluation for hyperthyroidism. HISTORY OF PRESENT ILLNESS:   Facundo Leos is a 81 y.o. male with a PMHx as noted below who presents to the endocrinology clinic for f/u evaluation of hyperthyroidism. Patient previously presented with labs suggestive of subclinical hyperthyroidism. Thyroid US did not reveal any nodules, just small cysts, but gland was enlarged at 6cm. Uptake and Scan was normal  Patient was trialled on low dose (5mg) of methimazole and eventually had a good response to therapy.   Methimazole started 12/19/16    Interval history:  Patient had been off his methimazole previously resulting in low TSH,  We had restarted him back on 5mg once daily and he felt better,   Previously he was not comfortable with going to the lab during the pandemic,  Thus we discussed staying on the same dose and planning for labs before todays visit,  Today patient notably has not yet repeated the lab for today,  He is feeling well however,  Continuing on the 5 mg of methimazole once per day,  No symptoms of tremors or palpitations,    Review of most recent thyroid function:  Lab Results   Component Value Date    TSH 0.016 (L) 12/02/2019    TSH 0.01 (L) 09/30/2019    TSH 0.601 04/11/2019    FT4 1.23 12/02/2019    FT4 1.2 09/30/2019    FT4 1.20 04/11/2019    T3LT 122 01/04/2019    T3LT 117 05/26/2017    T3LT 105 04/06/2017    TSILT 25 06/14/2016      TSILT = Thyroid stimulating antibodies  TMCLT = TPO antibodies  T3LT = Total T3 levels    PAST MEDICAL/SURGICAL HISTORY:   Past Medical History:   Diagnosis Date    Acute gouty arthropathy     Benign hypertensive heart disease without heart failure     Hypertrophy of prostate without urinary obstruction and other lower urinary tract symptoms (LUTS)     Memory difficulties     Other and unspecified hyperlipidemia      Past Surgical History:   Procedure Laterality Date    HX CATARACT REMOVAL  01/01/2008    Rt    HX COLONOSCOPY  7/15    [place], q 10 yrs    HX HERNIA REPAIR  01/01/1980       ALLERGIES:   No Known Allergies    MEDICATIONS ON ADMISSION:     Current Outpatient Medications:     methIMAzole (TAPAZOLE) 5 mg tablet, Take 2 tabs daily for 4 weeks, then reduce to 1 tab daily therafter (Patient taking differently: 1 tab daily therafter), Disp: 180 Tab, Rfl: 3    cetirizine (ZYRTEC) 10 mg tablet, TAKE 1 TABLET BY MOUTH ONCE DAILY FOR 7 DAYS AND THEN AS NEEDED, Disp: 30 Tab, Rfl: 5    tadalafil (CIALIS) 10 mg tablet, TAKE 1 TABLET BY MOUTH ONCE DAILY, Disp: , Rfl: 1    hydroCHLOROthiazide (HYDRODIURIL) 25 mg tablet, TAKE 1 TABLET BY MOUTH EVERY DAY, Disp: , Rfl: 0    valsartan (DIOVAN) 320 mg tablet, , Disp: , Rfl:     peg 400-propylene glycol (SYSTANE, PROPYLENE GLYCOL,) 0.4-0.3 % drop, Apply 1 Drop to eye. Several times per day, Disp: , Rfl:     VIT A/VIT C/VIT E/ZINC/COPPER (PRESERVISION AREDS PO), Take  by mouth two (2) times a day., Disp: , Rfl:     irbesartan (AVAPRO) 300 mg tablet, TAKE 1 TABLET BY MOUTH EVERY DAY FOR BLOOD PRESSURE, Disp: , Rfl: 3    cholecalciferol, VITAMIN D3, (VITAMIN D3) 5,000 unit tab tablet, Take  by mouth two (2) times a day., Disp: , Rfl:     aspirin delayed-release 81 mg tablet, Take  by mouth every fourty-eight (48) hours. , Disp: , Rfl:     B.infantis-B.ani-B.long-B.bifi 10-15 mg TbEC, Take  by mouth daily. 1 tab every other day, Disp: , Rfl:     amLODIPine (NORVASC) 10 mg tablet, Take  by mouth daily. , Disp: , Rfl:     atorvastatin (LIPITOR) 10 mg tablet, Take  by mouth daily. , Disp: , Rfl:     tamsulosin (FLOMAX) 0.4 mg capsule, Take 0.4 mg by mouth daily. , Disp: , Rfl:     SOCIAL HISTORY:   Social History     Socioeconomic History    Marital status:      Spouse name: Not on file    Number of children: Not on file    Years of education: Not on file    Highest education level: Not on file   Occupational History    Not on file   Social Needs    Financial resource strain: Not on file    Food insecurity     Worry: Not on file     Inability: Not on file    Transportation needs     Medical: Not on file     Non-medical: Not on file   Tobacco Use    Smoking status: Never Smoker    Smokeless tobacco: Never Used   Substance and Sexual Activity    Alcohol use: Yes     Alcohol/week: 1.7 standard drinks     Types: 2 Standard drinks or equivalent per week     Comment: social    Drug use: Not on file    Sexual activity: Not on file   Lifestyle    Physical activity     Days per week: Not on file     Minutes per session: Not on file    Stress: Not on file   Relationships    Social connections     Talks on phone: Not on file     Gets together: Not on file     Attends Mormon service: Not on file     Active member of club or organization: Not on file     Attends meetings of clubs or organizations: Not on file     Relationship status: Not on file    Intimate partner violence     Fear of current or ex partner: Not on file     Emotionally abused: Not on file     Physically abused: Not on file     Forced sexual activity: Not on file   Other Topics Concern    Not on file   Social History Narrative    Not on file       FAMILY HISTORY:  Family History   Problem Relation Age of Onset    Hypertension Mother     Stroke Mother     Hypertension Father     Stroke Father     Breast Cancer Sister        REVIEW OF SYSTEMS: Complete ROS assessed and noted for that which is described above, all else are negative.   Eyes: normal  ENT: normal  CVS: normal  Resp: normal  GI: normal  : normal  GYN: normal  Endocrine: normal  Integument: normal  Musculoskeletal: normal  Neuro: normal  Psych: normal    PHYSICAL EXAMINATION:  Telemedicine Visit    GENERAL: NCAT, Appears well nourished  EYES: EOMI, non-icteric, no proptosis  Ear/Nose/Throat: NCAT, no visible inflammation or masses  CARDIOVASCULAR: no cyanosis, no visible JVD  RESPIRATORY: comfortable respirations observed, no cyanosis  MUSCULOSKELETAL: Normal ROM of upper extremities observed  SKIN: No edema, rash, or other significant changes observed  NEUROLOGIC:  AAOx3  PSYCHIATRIC: Normal affect, Normal insight and judgement     REVIEW OF LABORATORY AND RADIOLOGY DATA:   Labs and documentation have been reviewed as described above. ASSESSMENT AND PLAN:   Felipe Woodard is a 81 y.o. male with a PMHx as noted above who presents to the endocrinology clinic for evaluation of hyperthyroidism. Subclinical hyperthyroidism    Hyperthyroidism:   Patient has not had biochemical evaluation of his thyroid since Dec of last year in 2019, as he had not felt comfortable appearing at the lab during the pandemic, and we noted on the prior visit that it would be ok to wait until this visit as long as he continued to feel well clinically and continued to take his methimazole. He has continued as we discussed. Patient to report to the lab sometime in the next week or so when he is able to complete his labs. I will review his levels and adjust his methimazole dose as appropriate. We discussed his history and the need for continued therapy. We also discussed the potential consequences of untreated hyperthyroidism. He demonstrated his understanding. He is otherwise doing well clinically. Continue methimazole 5mg daily,  Thyroid labs are ordered,    RTC in 6 months, March 15 at 10:30,     20 minutes spent toward telemedicine visit today of which >50% of this time was spent in counseling and coordination of care. [facility].  [address] [facility]

## 2021-03-15 ENCOUNTER — VIRTUAL VISIT (OUTPATIENT)
Dept: ENDOCRINOLOGY | Age: 86
End: 2021-03-15
Payer: MEDICARE

## 2021-03-15 DIAGNOSIS — E05.90 SUBCLINICAL HYPERTHYROIDISM: ICD-10-CM

## 2021-03-15 DIAGNOSIS — E05.90 SUBCLINICAL HYPERTHYROIDISM: Primary | ICD-10-CM

## 2021-03-15 PROCEDURE — G8427 DOCREV CUR MEDS BY ELIG CLIN: HCPCS | Performed by: INTERNAL MEDICINE

## 2021-03-15 PROCEDURE — 1101F PT FALLS ASSESS-DOCD LE1/YR: CPT | Performed by: INTERNAL MEDICINE

## 2021-03-15 PROCEDURE — G8432 DEP SCR NOT DOC, RNG: HCPCS | Performed by: INTERNAL MEDICINE

## 2021-03-15 PROCEDURE — 99213 OFFICE O/P EST LOW 20 MIN: CPT | Performed by: INTERNAL MEDICINE

## 2021-03-15 RX ORDER — HYDROCHLOROTHIAZIDE 12.5 MG/1
12.5 TABLET ORAL DAILY
COMMUNITY
End: 2022-02-25 | Stop reason: ALTCHOICE

## 2021-03-15 NOTE — PROGRESS NOTES
**DUE TO PANDEMIC AND HEALTH CONCERNS IN THE COMMUNITY, THIS PATIENT WAS EITHER ILL OR FOUND TO BE HIGH RISK FOR IN-PERSON EVALUATION WITHIN THE CLINIC. THE FOLLOWING IS A VIRTUAL TELEMEDICINE VIDEO ENCOUNTER VIA [facility], TO WHICH THE PATIENT AGREED. THE PURPOSE IS TO LIMIT INTERRUPTIONS IN HEALTHCARE AND TO PROVIDE FOR ONGOING URGENT NEEDS UNDER THE CURRENT CONDITIONS. CHIEF COMPLAINT: f/u evaluation for hyperthyroidism. HISTORY OF PRESENT ILLNESS:   Trinidad Lindsey is a 81 y.o. male with a PMHx as noted below who presents to the endocrinology clinic for f/u evaluation of hyperthyroidism. Patient previously presented with labs suggestive of subclinical hyperthyroidism. Thyroid US did not reveal any nodules, just small cysts, but gland was enlarged at 6cm. Uptake and Scan was normal  Patient was trialled on low dose (5mg) of methimazole and eventually had a good response to therapy.   Methimazole started 12/19/16    Interval history:  Patient had been off his methimazole previously resulting in low TSH,  We had restarted him back on 5mg once daily and he felt better,   In 2020 he was not comfortable with going to the lab during the pandemic,  He was clinically stable at the time,  He did repeat the TSH however in October of 2020 with stable TSH level,  He has continued to take 5 mg of methimazole once per day since that time,  He is currently without overt symptoms of hyper or hypothyroidism,  No reported fatigue,  No interval hospitalizations since last visit, has new hearing aids, not fond of them,  Had his first vaccine for covid recently,   No recent labs for review,     Review of most recent thyroid function:  Lab Results   Component Value Date    TSH 3.55 10/21/2020    TSH 0.016 (L) 12/02/2019    TSH 0.01 (L) 09/30/2019    FT4 0.9 10/21/2020    FT4 1.23 12/02/2019    FT4 1.2 09/30/2019    T3LT 122 01/04/2019    T3LT 117 05/26/2017    T3LT 105 04/06/2017    TSILT 25 06/14/2016      TSILT = Thyroid stimulating antibodies  TMCLT = TPO antibodies  T3LT = Total T3 levels    PAST MEDICAL/SURGICAL HISTORY:   Past Medical History:   Diagnosis Date    Acute gouty arthropathy     Benign hypertensive heart disease without heart failure     Hypertrophy of prostate without urinary obstruction and other lower urinary tract symptoms (LUTS)     Memory difficulties     Other and unspecified hyperlipidemia      Past Surgical History:   Procedure Laterality Date    HX CATARACT REMOVAL  01/01/2008    Rt    HX COLONOSCOPY  7/15    [place], q 10 yrs    HX HERNIA REPAIR  01/01/1980       ALLERGIES:   No Known Allergies    MEDICATIONS ON ADMISSION:     Current Outpatient Medications:     hydroCHLOROthiazide (HYDRODIURIL) 12.5 mg tablet, Take 12.5 mg by mouth daily. , Disp: , Rfl:     methIMAzole (TAPAZOLE) 5 mg tablet, Take 2 tabs daily for 4 weeks, then reduce to 1 tab daily therafter (Patient taking differently: 1 tab daily therafter), Disp: 180 Tab, Rfl: 3    VIT A/VIT C/VIT E/ZINC/COPPER (PRESERVISION AREDS PO), Take  by mouth two (2) times a day., Disp: , Rfl:     irbesartan (AVAPRO) 300 mg tablet, TAKE 1 TABLET BY MOUTH EVERY DAY FOR BLOOD PRESSURE, Disp: , Rfl: 3    cholecalciferol, VITAMIN D3, (VITAMIN D3) 5,000 unit tab tablet, Take  by mouth two (2) times a day., Disp: , Rfl:     B.infantis-B.ani-B.long-B.bifi 10-15 mg TbEC, Take  by mouth daily. 1 tab every other day, Disp: , Rfl:     amLODIPine (NORVASC) 10 mg tablet, Take  by mouth daily. , Disp: , Rfl:     atorvastatin (LIPITOR) 10 mg tablet, Take  by mouth daily. , Disp: , Rfl:     tamsulosin (FLOMAX) 0.4 mg capsule, Take 0.4 mg by mouth daily. , Disp: , Rfl:     cetirizine (ZYRTEC) 10 mg tablet, TAKE 1 TABLET BY MOUTH ONCE DAILY FOR 7 DAYS AND THEN AS NEEDED, Disp: 30 Tab, Rfl: 5    tadalafil (CIALIS) 10 mg tablet, TAKE 1 TABLET BY MOUTH ONCE DAILY, Disp: , Rfl: 1    hydroCHLOROthiazide (HYDRODIURIL) 25 mg tablet, TAKE 1 TABLET BY MOUTH EVERY DAY, Disp: , Rfl: 0    valsartan (DIOVAN) 320 mg tablet, , Disp: , Rfl:     peg 400-propylene glycol (SYSTANE, PROPYLENE GLYCOL,) 0.4-0.3 % drop, Apply 1 Drop to eye. Several times per day, Disp: , Rfl:     aspirin delayed-release 81 mg tablet, Take  by mouth every fourty-eight (48) hours. , Disp: , Rfl:     SOCIAL HISTORY:   Social History     Socioeconomic History    Marital status:      Spouse name: Not on file    Number of children: Not on file    Years of education: Not on file    Highest education level: Not on file   Occupational History    Not on file   Social Needs    Financial resource strain: Not on file    Food insecurity     Worry: Not on file     Inability: Not on file    Transportation needs     Medical: Not on file     Non-medical: Not on file   Tobacco Use    Smoking status: Never Smoker    Smokeless tobacco: Never Used   Substance and Sexual Activity    Alcohol use:  Yes     Alcohol/week: 1.7 standard drinks     Types: 2 Standard drinks or equivalent per week     Comment: social    Drug use: Not on file    Sexual activity: Not on file   Lifestyle    Physical activity     Days per week: Not on file     Minutes per session: Not on file    Stress: Not on file   Relationships    Social connections     Talks on phone: Not on file     Gets together: Not on file     Attends Orthodox service: Not on file     Active member of club or organization: Not on file     Attends meetings of clubs or organizations: Not on file     Relationship status: Not on file    Intimate partner violence     Fear of current or ex partner: Not on file     Emotionally abused: Not on file     Physically abused: Not on file     Forced sexual activity: Not on file   Other Topics Concern    Not on file   Social History Narrative    Not on file       FAMILY HISTORY:  Family History   Problem Relation Age of Onset    Hypertension Mother     Stroke Mother     Hypertension Father     Stroke Father     Breast Cancer Sister        REVIEW OF SYSTEMS: Complete ROS assessed and noted for that which is described above, all else are negative. Eyes: normal  ENT: normal  CVS: normal  Resp: normal  GI: normal  : normal  GYN: normal  Endocrine: normal  Integument: normal  Musculoskeletal: normal  Neuro: normal  Psych: normal    PHYSICAL EXAMINATION:  Telemedicine Visit    GENERAL: NCAT, Appears well nourished  EYES: EOMI, non-icteric, no proptosis  Ear/Nose/Throat: NCAT, no visible inflammation or masses  CARDIOVASCULAR: no cyanosis, no visible JVD  RESPIRATORY: comfortable respirations observed, no cyanosis  MUSCULOSKELETAL: Normal ROM of upper extremities observed  SKIN: No edema, rash, or other significant changes observed  NEUROLOGIC:  AAOx3  PSYCHIATRIC: Normal affect, Normal insight and judgement     REVIEW OF LABORATORY AND RADIOLOGY DATA:   Labs and documentation have been reviewed as described above. ASSESSMENT AND PLAN:   Alison Barr is a 81 y.o. male with a PMHx as noted above who presents to the endocrinology clinic for evaluation of hyperthyroidism. Subclinical hyperthyroidism    Hyperthyroidism:   Continue methimazole 5mg daily,  Thyroid labs ordered to be completed,   Will spend additional time reviewing and advising on his methimazole dose,    He will need to repeat his thyroid level with his PCP in about 4 months or establish care with another endocrinologist by that time for routine evaluation and management, noting that I will not be present here after May. He is welcome to call with questions or concerns before that time however. 20 minutes spent toward telemedicine visit today of which >50% of this time was spent in counseling and coordination of care. Linda Blackmon.  [address] [facility]

## 2021-03-22 ENCOUNTER — HOSPITAL ENCOUNTER (OUTPATIENT)
Dept: LAB | Age: 86
Discharge: HOME | End: 2021-03-22

## 2021-03-23 LAB
T4 FREE SERPL-MCNC: 0.96 NG/DL (ref 0.82–1.77)
TSH SERPL DL<=0.005 MIU/L-ACNC: 2.06 UIU/ML (ref 0.45–4.5)

## 2021-03-23 NOTE — PROGRESS NOTES
Roro Lane,   Thyroid levels looks great, no changes in methimazole dose.  Should repeat his TSH with his primary clinic in about 4-5 months,     Thanks,

## 2021-03-24 ENCOUNTER — TELEPHONE (OUTPATIENT)
Dept: ENDOCRINOLOGY | Age: 86
End: 2021-03-24

## 2021-05-02 ENCOUNTER — APPOINTMENT (OUTPATIENT)
Dept: GENERAL RADIOLOGY | Age: 86
DRG: 493 | End: 2021-05-02
Attending: EMERGENCY MEDICINE
Payer: MEDICARE

## 2021-05-02 ENCOUNTER — HOSPITAL ENCOUNTER (INPATIENT)
Age: 86
LOS: 4 days | Discharge: HOME HEALTH | DRG: 493 | End: 2021-05-06
Attending: EMERGENCY MEDICINE | Admitting: INTERNAL MEDICINE
Payer: MEDICARE

## 2021-05-02 DIAGNOSIS — S82.852A CLOSED TRIMALLEOLAR FRACTURE OF LEFT ANKLE, INITIAL ENCOUNTER: Primary | ICD-10-CM

## 2021-05-02 LAB
ALBUMIN SERPL-MCNC: 3.8 G/DL (ref 3.5–5)
ALBUMIN/GLOB SERPL: 1.2 {RATIO} (ref 1.1–2.2)
ALP SERPL-CCNC: 123 U/L (ref 45–117)
ALT SERPL-CCNC: 26 U/L (ref 12–78)
ANION GAP SERPL CALC-SCNC: 9 MMOL/L (ref 5–15)
AST SERPL-CCNC: 27 U/L (ref 15–37)
BASOPHILS # BLD: 0 K/UL (ref 0–0.1)
BASOPHILS NFR BLD: 0 % (ref 0–1)
BILIRUB SERPL-MCNC: 0.5 MG/DL (ref 0.2–1)
BUN SERPL-MCNC: 28 MG/DL (ref 6–20)
BUN/CREAT SERPL: 16 (ref 12–20)
CALCIUM SERPL-MCNC: 8.7 MG/DL (ref 8.5–10.1)
CHLORIDE SERPL-SCNC: 103 MMOL/L (ref 97–108)
CO2 SERPL-SCNC: 30 MMOL/L (ref 21–32)
CREAT SERPL-MCNC: 1.75 MG/DL (ref 0.7–1.3)
DIFFERENTIAL METHOD BLD: ABNORMAL
EOSINOPHIL # BLD: 0.1 K/UL (ref 0–0.4)
EOSINOPHIL NFR BLD: 2 % (ref 0–7)
ERYTHROCYTE [DISTWIDTH] IN BLOOD BY AUTOMATED COUNT: 12.8 % (ref 11.5–14.5)
GLOBULIN SER CALC-MCNC: 3.3 G/DL (ref 2–4)
GLUCOSE SERPL-MCNC: 133 MG/DL (ref 65–100)
HCT VFR BLD AUTO: 39.8 % (ref 36.6–50.3)
HGB BLD-MCNC: 13.6 G/DL (ref 12.1–17)
IMM GRANULOCYTES # BLD AUTO: 0 K/UL (ref 0–0.04)
IMM GRANULOCYTES NFR BLD AUTO: 0 % (ref 0–0.5)
LYMPHOCYTES # BLD: 0.8 K/UL (ref 0.8–3.5)
LYMPHOCYTES NFR BLD: 11 % (ref 12–49)
MCH RBC QN AUTO: 32.4 PG (ref 26–34)
MCHC RBC AUTO-ENTMCNC: 34.2 G/DL (ref 30–36.5)
MCV RBC AUTO: 94.8 FL (ref 80–99)
MONOCYTES # BLD: 0.5 K/UL (ref 0–1)
MONOCYTES NFR BLD: 7 % (ref 5–13)
NEUTS SEG # BLD: 5.5 K/UL (ref 1.8–8)
NEUTS SEG NFR BLD: 80 % (ref 32–75)
NRBC # BLD: 0 K/UL (ref 0–0.01)
NRBC BLD-RTO: 0 PER 100 WBC
PLATELET # BLD AUTO: 227 K/UL (ref 150–400)
PMV BLD AUTO: 9 FL (ref 8.9–12.9)
POTASSIUM SERPL-SCNC: 3.9 MMOL/L (ref 3.5–5.1)
PROT SERPL-MCNC: 7.1 G/DL (ref 6.4–8.2)
RBC # BLD AUTO: 4.2 M/UL (ref 4.1–5.7)
RBC MORPH BLD: ABNORMAL
SODIUM SERPL-SCNC: 142 MMOL/L (ref 136–145)
WBC # BLD AUTO: 6.9 K/UL (ref 4.1–11.1)

## 2021-05-02 PROCEDURE — 36415 COLL VENOUS BLD VENIPUNCTURE: CPT

## 2021-05-02 PROCEDURE — 85025 COMPLETE CBC W/AUTO DIFF WBC: CPT

## 2021-05-02 PROCEDURE — 73610 X-RAY EXAM OF ANKLE: CPT

## 2021-05-02 PROCEDURE — 65270000029 HC RM PRIVATE

## 2021-05-02 PROCEDURE — 80053 COMPREHEN METABOLIC PANEL: CPT

## 2021-05-02 PROCEDURE — 74011250636 HC RX REV CODE- 250/636: Performed by: EMERGENCY MEDICINE

## 2021-05-02 PROCEDURE — 99283 EMERGENCY DEPT VISIT LOW MDM: CPT

## 2021-05-02 RX ORDER — MORPHINE SULFATE 2 MG/ML
2 INJECTION, SOLUTION INTRAMUSCULAR; INTRAVENOUS
Status: ACTIVE | OUTPATIENT
Start: 2021-05-02 | End: 2021-05-03

## 2021-05-02 RX ORDER — MORPHINE SULFATE 4 MG/ML
4 INJECTION INTRAVENOUS
Status: COMPLETED | OUTPATIENT
Start: 2021-05-02 | End: 2021-05-03

## 2021-05-02 RX ORDER — ONDANSETRON 2 MG/ML
4 INJECTION INTRAMUSCULAR; INTRAVENOUS
Status: DISCONTINUED | OUTPATIENT
Start: 2021-05-02 | End: 2021-05-06 | Stop reason: HOSPADM

## 2021-05-02 RX ORDER — ONDANSETRON 2 MG/ML
4 INJECTION INTRAMUSCULAR; INTRAVENOUS
Status: ACTIVE | OUTPATIENT
Start: 2021-05-02 | End: 2021-05-03

## 2021-05-02 RX ADMIN — MORPHINE SULFATE 4 MG: 4 INJECTION INTRAVENOUS at 21:03

## 2021-05-02 NOTE — ED PROVIDER NOTES
[address] [facility]  EMERGENCY DEPARTMENT HISTORY AND PHYSICAL EXAM         Date of Service: 5/2/2021   Patient Name: Pedro Luis Mabry   YOB: 1935  Medical Record Number: [id number]    History of Presenting Illness     Chief Complaint   Patient presents with    Ankle Injury        History Provided By:  patient    Additional History:   Pedro Luis Mabry is a 81 y.o. male who presents via EMS to the ED with cc of left ankle pain and swelling after he was forced to jump off a riding mower that was about to go over an embankment, twisting his ankle. HE is unsure of the mechanism of the injury. He is unable to weight bear. Limmie Hash in mainly medial. No other injury. There are no other complaints, changes or physical findings at this time. Primary Care Provider: Kenn Tierney MD   Specialist:    Past History     Past Medical History:   Past Medical History:   Diagnosis Date    Acute gouty arthropathy     Benign hypertensive heart disease without heart failure     Hypertrophy of prostate without urinary obstruction and other lower urinary tract symptoms (LUTS)     Memory difficulties     Other and unspecified hyperlipidemia         Past Surgical History:   Past Surgical History:   Procedure Laterality Date    HX CATARACT REMOVAL  01/01/2008    Rt    HX COLONOSCOPY  7/15    [place], q 10 yrs    HX HERNIA REPAIR  01/01/1980        Family History:   Family History   Problem Relation Age of Onset    Hypertension Mother     Stroke Mother     Hypertension Father     Stroke Father     Breast Cancer Sister         Social History:   Social History     Tobacco Use    Smoking status: Never Smoker    Smokeless tobacco: Never Used   Substance Use Topics    Alcohol use:  Yes     Alcohol/week: 1.7 standard drinks     Types: 2 Standard drinks or equivalent per week     Comment: social    Drug use: Not on file        Allergies:   No Known Allergies     Review of Systems   Review of Systems   Constitutional: Negative for appetite change, chills and fever. HENT: Negative for congestion. Eyes: Negative for visual disturbance. Respiratory: Negative for cough, shortness of breath and wheezing. Cardiovascular: Negative for chest pain, palpitations and leg swelling. Gastrointestinal: Negative for abdominal pain. Genitourinary: Negative for dysuria, frequency and urgency. Musculoskeletal: Positive for arthralgias and joint swelling. Negative for back pain, myalgias and neck stiffness. Skin: Negative for rash. Neurological: Negative for dizziness, syncope, weakness and headaches. Hematological: Negative for adenopathy. Psychiatric/Behavioral: Negative for behavioral problems and dysphoric mood. Physical Exam  Physical Exam  Vitals signs and nursing note reviewed. Constitutional:       General: He is not in acute distress. Appearance: He is well-developed. HENT:      Head: Normocephalic and atraumatic. Eyes:      General: No scleral icterus. Conjunctiva/sclera: Conjunctivae normal.      Pupils: Pupils are equal, round, and reactive to light. Neck:      Musculoskeletal: Normal range of motion and neck supple. Cardiovascular:      Rate and Rhythm: Normal rate and regular rhythm. Heart sounds: No murmur. No gallop. Pulmonary:      Effort: Pulmonary effort is normal. No respiratory distress. Breath sounds: No stridor. No wheezing or rales. Abdominal:      General: Bowel sounds are normal. There is no distension. Palpations: Abdomen is soft. There is no mass. Tenderness: There is no abdominal tenderness. There is no guarding or rebound. Musculoskeletal:         General: Swelling, tenderness and signs of injury present. No deformity. Comments: Left ankle swollen bilaterally, TTP medial malleolus, no deformity of foot, normal DP pulse. Lymphadenopathy:      Cervical: No cervical adenopathy.    Skin:     General: Skin is warm and dry. Findings: No erythema or rash. Neurological:      Mental Status: He is alert and oriented to person, place, and time. Cranial Nerves: No cranial nerve deficit. Coordination: Coordination normal.         Medical Decision Making   I am the first provider for this patient. I reviewed the vital signs, available nursing notes, past medical history, past surgical history, family history and social history. Old Medical Records: EMS report     Provider Notes:   DDX: Sprain, strain, fracture     ED Course:  6:18 PM   Initial assessment performed. The patients presenting problems have been discussed, and they are in agreement with the care plan formulated and outlined with them. I have encouraged them to ask questions as they arise throughout their visit. Progress Notes:  7:25 PM  Pt has a trimalleolar ankle fracture. D/W Dr. So Johnson, who agrees with admission for ORIF on Tuesday. Requests Hospitalist admission. Morales Goss MD      Procedures:   Procedures    Diagnostic Study Results   Labs -    No results found for this or any previous visit (from the past 12 hour(s)). Radiologic Studies -  The following have been ordered and reviewed:  XR ANKLE LT MIN 3 V   Final Result   Acute left trimalleolar fracture subluxation as described. 2   intra-articular loose bodies        CT Results  (Last 48 hours)    None        CXR Results  (Last 48 hours)    None            Vital Signs-Reviewed the patient's vital signs. Patient Vitals for the past 12 hrs:   Temp Pulse Resp BP SpO2   05/02/21 1805 98.4 °F (36.9 °C) 85 16 126/81 96 %       Medications Given in the ED:  Medications   morphine injection 2 mg (has no administration in time range)   ondansetron (ZOFRAN) injection 4 mg (has no administration in time range)       Diagnosis:  Clinical Impression:   1.  Closed trimalleolar fracture of left ankle, initial encounter         Disposition:  Admit  _______________________________ Attestations: This note was performed by Shubham Crockett MD in its entirety.   _______________________________

## 2021-05-02 NOTE — ED TRIAGE NOTES
Pt fell off lawnmower 2 hours ago, landed sideways on left ankle, c/o pain 2/10.  Ankle swollen +2 at joint

## 2021-05-03 ENCOUNTER — APPOINTMENT (OUTPATIENT)
Dept: GENERAL RADIOLOGY | Age: 86
DRG: 493 | End: 2021-05-03
Attending: INTERNAL MEDICINE
Payer: MEDICARE

## 2021-05-03 ENCOUNTER — APPOINTMENT (OUTPATIENT)
Dept: GENERAL RADIOLOGY | Age: 86
DRG: 493 | End: 2021-05-03
Attending: ORTHOPAEDIC SURGERY
Payer: MEDICARE

## 2021-05-03 ENCOUNTER — ANESTHESIA EVENT (OUTPATIENT)
Dept: SURGERY | Age: 86
DRG: 493 | End: 2021-05-03
Payer: MEDICARE

## 2021-05-03 PROBLEM — E05.00 GRAVES DISEASE: Chronic | Status: ACTIVE | Noted: 2021-05-03

## 2021-05-03 PROCEDURE — 74011250636 HC RX REV CODE- 250/636: Performed by: ORTHOPAEDIC SURGERY

## 2021-05-03 PROCEDURE — 65270000029 HC RM PRIVATE

## 2021-05-03 PROCEDURE — 74011250636 HC RX REV CODE- 250/636: Performed by: EMERGENCY MEDICINE

## 2021-05-03 PROCEDURE — 71045 X-RAY EXAM CHEST 1 VIEW: CPT

## 2021-05-03 PROCEDURE — 73600 X-RAY EXAM OF ANKLE: CPT

## 2021-05-03 PROCEDURE — 74011250637 HC RX REV CODE- 250/637: Performed by: INTERNAL MEDICINE

## 2021-05-03 RX ORDER — MORPHINE SULFATE 4 MG/ML
4 INJECTION INTRAVENOUS
Status: DISCONTINUED | OUTPATIENT
Start: 2021-05-03 | End: 2021-05-06 | Stop reason: HOSPADM

## 2021-05-03 RX ORDER — METHIMAZOLE 5 MG/1
5 TABLET ORAL DAILY
Status: DISCONTINUED | OUTPATIENT
Start: 2021-05-04 | End: 2021-05-06 | Stop reason: HOSPADM

## 2021-05-03 RX ORDER — AMLODIPINE BESYLATE 10 MG/1
10 TABLET ORAL DAILY
Status: DISCONTINUED | OUTPATIENT
Start: 2021-05-04 | End: 2021-05-06 | Stop reason: HOSPADM

## 2021-05-03 RX ORDER — POLYETHYLENE GLYCOL 3350 17 G/17G
17 POWDER, FOR SOLUTION ORAL DAILY PRN
Status: DISCONTINUED | OUTPATIENT
Start: 2021-05-03 | End: 2021-05-06 | Stop reason: HOSPADM

## 2021-05-03 RX ORDER — TAMSULOSIN HYDROCHLORIDE 0.4 MG/1
0.4 CAPSULE ORAL DAILY
Status: DISCONTINUED | OUTPATIENT
Start: 2021-05-04 | End: 2021-05-06 | Stop reason: HOSPADM

## 2021-05-03 RX ORDER — SODIUM CHLORIDE 0.9 % (FLUSH) 0.9 %
5-40 SYRINGE (ML) INJECTION AS NEEDED
Status: DISCONTINUED | OUTPATIENT
Start: 2021-05-03 | End: 2021-05-06 | Stop reason: HOSPADM

## 2021-05-03 RX ORDER — LOSARTAN POTASSIUM 100 MG/1
100 TABLET ORAL DAILY
Status: DISCONTINUED | OUTPATIENT
Start: 2021-05-04 | End: 2021-05-06 | Stop reason: HOSPADM

## 2021-05-03 RX ORDER — SODIUM CHLORIDE 0.9 % (FLUSH) 0.9 %
5-40 SYRINGE (ML) INJECTION EVERY 8 HOURS
Status: DISCONTINUED | OUTPATIENT
Start: 2021-05-03 | End: 2021-05-06 | Stop reason: HOSPADM

## 2021-05-03 RX ORDER — ACETAMINOPHEN 650 MG/1
650 SUPPOSITORY RECTAL
Status: DISCONTINUED | OUTPATIENT
Start: 2021-05-03 | End: 2021-05-06 | Stop reason: HOSPADM

## 2021-05-03 RX ORDER — MELATONIN
5000 DAILY
Status: DISCONTINUED | OUTPATIENT
Start: 2021-05-04 | End: 2021-05-06 | Stop reason: HOSPADM

## 2021-05-03 RX ORDER — ACETAMINOPHEN 325 MG/1
650 TABLET ORAL
Status: DISCONTINUED | OUTPATIENT
Start: 2021-05-03 | End: 2021-05-06 | Stop reason: HOSPADM

## 2021-05-03 RX ORDER — HYDROCHLOROTHIAZIDE 25 MG/1
25 TABLET ORAL DAILY
Status: DISCONTINUED | OUTPATIENT
Start: 2021-05-04 | End: 2021-05-06 | Stop reason: HOSPADM

## 2021-05-03 RX ORDER — ATORVASTATIN CALCIUM 10 MG/1
10 TABLET, FILM COATED ORAL
Status: DISCONTINUED | OUTPATIENT
Start: 2021-05-03 | End: 2021-05-06 | Stop reason: HOSPADM

## 2021-05-03 RX ADMIN — Medication 10 ML: at 21:10

## 2021-05-03 RX ADMIN — ONDANSETRON 4 MG: 2 INJECTION INTRAMUSCULAR; INTRAVENOUS at 08:32

## 2021-05-03 RX ADMIN — MORPHINE SULFATE 4 MG: 4 INJECTION INTRAVENOUS at 01:56

## 2021-05-03 RX ADMIN — MORPHINE SULFATE 4 MG: 4 INJECTION INTRAVENOUS at 08:32

## 2021-05-03 RX ADMIN — ATORVASTATIN CALCIUM 10 MG: 10 TABLET, FILM COATED ORAL at 21:10

## 2021-05-03 RX ADMIN — I-VITE, TAB 1000-60-2MG (60/BT) 1 TABLET: TAB at 17:23

## 2021-05-03 RX ADMIN — Medication 10 ML: at 13:00

## 2021-05-03 NOTE — CONSULTS
Ortho Consult Note      Complaints: Left ankle pain following fall from lawn tractor. HPI: Very pleasant overall healthy 78-year-old male who was riding his lawn tractor yesterday. He apparently was going through a thin area and lost control of the tractor and jumped off. As he did so he twisted his left ankle. He was unable to bear weight on the ankle following this. He was seen in the emergency room where he was noted to have a trimalleolar ankle fracture subluxation. Dr. Peter Trevino called me and placed him into a splint and admitted him to the medical service. He is doing reasonably good this morning. He is complaining of moderate amount of pain. He seems to be in overall good health. He is treated for hypertension and stays very active. Visit Vitals  /68 (BP 1 Location: Left upper arm, BP Patient Position: At rest)   Pulse 87   Temp 98.4 °F (36.9 °C)   Resp 18   Ht 5' 7\" (1.702 m)   Wt 77.1 kg (170 lb)   SpO2 96%   BMI 26.63 kg/m²     Physical Exam:   Visit Vitals  /68 (BP 1 Location: Left upper arm, BP Patient Position: At rest) Comment (BP Patient Position): HOB 40   Pulse 87   Temp 98.4 °F (36.9 °C)   Resp 18   Ht 5' 7\" (1.702 m)   Wt 77.1 kg (170 lb)   SpO2 96%   BMI 26.63 kg/m²     General appearance: The patient is alert and oriented x3. He is reasonably comfortable laying in bed. He is able to follow commands. He has good movement of his toes. There is good capillary refill. He has excellent pulse in his right lower extremity. Extremities:   Examination of his left lower extremity reveals it to be in a splint. There is relative external rotation at the ankle. Pulses:   I removed his splint and allow gravity to more less reduces fracture dislocation. He had a good dorsalis pedis pulse when the splint was removed. I then placed him back into a sugar-tong type splint for immobilization. Skin:   His skin was intact without any disruption of the skin.   There is no fracture blisters present. Neurologic:   Neurologic function is intact to sensory exam and motor testing into the toes. X-ray of the ankle demonstrates there is a trimalleolar fracture subluxation of the ankle. The posterior fragment appears to represent at least 30% of the joint surface and may need repairing as well. I repeated x-rays after reduction and placement in splint. This shows better reduction of the ankle fracture. There is a comminuted lateral malleolus fracture which is a Tinajero type C fracture. There is a transverse medial malleolus fracture and an oblique posterior malleolus fracture. Recent Results (from the past 24 hour(s))   CBC WITH AUTOMATED DIFF    Collection Time: 05/02/21  7:45 PM   Result Value Ref Range    WBC 6.9 4.1 - 11.1 K/uL    RBC 4.20 4. 10 - 5.70 M/uL    HGB 13.6 12.1 - 17.0 g/dL    HCT 39.8 36.6 - 50.3 %    MCV 94.8 80.0 - 99.0 FL    MCH 32.4 26.0 - 34.0 PG    MCHC 34.2 30.0 - 36.5 g/dL    RDW 12.8 11.5 - 14.5 %    PLATELET 935 496 - 780 K/uL    MPV 9.0 8.9 - 12.9 FL    NRBC 0.0 0  WBC    ABSOLUTE NRBC 0.00 0.00 - 0.01 K/uL    NEUTROPHILS 80 (H) 32 - 75 %    LYMPHOCYTES 11 (L) 12 - 49 %    MONOCYTES 7 5 - 13 %    EOSINOPHILS 2 0 - 7 %    BASOPHILS 0 0 - 1 %    IMMATURE GRANULOCYTES 0 0.0 - 0.5 %    ABS. NEUTROPHILS 5.5 1.8 - 8.0 K/UL    ABS. LYMPHOCYTES 0.8 0.8 - 3.5 K/UL    ABS. MONOCYTES 0.5 0.0 - 1.0 K/UL    ABS. EOSINOPHILS 0.1 0.0 - 0.4 K/UL    ABS. BASOPHILS 0.0 0.0 - 0.1 K/UL    ABS. IMM.  GRANS. 0.0 0.00 - 0.04 K/UL    DF SMEAR SCANNED      RBC COMMENTS NORMOCYTIC, NORMOCHROMIC     METABOLIC PANEL, COMPREHENSIVE    Collection Time: 05/02/21  7:45 PM   Result Value Ref Range    Sodium 142 136 - 145 mmol/L    Potassium 3.9 3.5 - 5.1 mmol/L    Chloride 103 97 - 108 mmol/L    CO2 30 21 - 32 mmol/L    Anion gap 9 5 - 15 mmol/L    Glucose 133 (H) 65 - 100 mg/dL    BUN 28 (H) 6 - 20 MG/DL    Creatinine 1.75 (H) 0.70 - 1.30 MG/DL    BUN/Creatinine ratio 16 12 - 20      GFR est AA 45 (L) >60 ml/min/1.73m2    GFR est non-AA 37 (L) >60 ml/min/1.73m2    Calcium 8.7 8.5 - 10.1 MG/DL    Bilirubin, total 0.5 0.2 - 1.0 MG/DL    ALT (SGPT) 26 12 - 78 U/L    AST (SGOT) 27 15 - 37 U/L    Alk. phosphatase 123 (H) 45 - 117 U/L    Protein, total 7.1 6.4 - 8.2 g/dL    Albumin 3.8 3.5 - 5.0 g/dL    Globulin 3.3 2.0 - 4.0 g/dL    A-G Ratio 1.2 1.1 - 2.2         Assessment:  1. Closed trimalleolar ankle fracture dislocation from a twisting injury occurring last evening. 2. Overall healthy 30-year-old male with an unstable ankle fracture. Plan:    I have discussed findings with the patient. I have explained that he has unstable ankle injury. Closed treatment is very difficult to manage this very unstable fracture. I have recommended that we take him to the operating room tomorrow and we will go ahead and fix these fractures with plate and screws. I have discussed the risk of infection and damage to nerves and blood vessels. There is a risk of arthritis in the ankle. He wishes to proceed. Pending medical clearance we will go ahead and plan for open  Reduction and internal fixation of left trimalleolar ankle fracture.

## 2021-05-03 NOTE — H&P
[facility]   Admission History & Physical        5/3/2021 9:24 AM  Patient: Kat Greer 1935  PCP: Arianne Martin MD    HISTORY  Chief Complaint:   Chief Complaint   Patient presents with    Ankle Injury       HPI: 81 y.o. male presenting for admission to [facility] for further evaluation and treatment for Closed trimalleolar fracture of ankle, left, initial encounter. He  has a past medical history of Acute gouty arthropathy, Benign hypertensive heart disease without heart failure, Hypertrophy of prostate without urinary obstruction and other lower urinary tract symptoms (LUTS), Memory difficulties, and Other and unspecified hyperlipidemia. Latrice Montiel He presented to the ED vis EMS following injury jumping off his lawn tractor before it went down an embankment. His ankle was caught and was injured. Dr. Kehinde Wilcox has assessed and has scheduled for surgery on Tuesday. No h/o CAD, denies h/o CP or TUTTLE. No acute illness with cough, fever, congestion, GI or  upset. Lives with his wife and mother-in-law. Has been tx by PCP in [place] area where he lived [place] years ago. Has been on tx for Hyperthyroidism with recent labs in therapeutic range. Past Medical History:  Past Medical History:   Diagnosis Date    Acute gouty arthropathy     Benign hypertensive heart disease without heart failure     Hypertrophy of prostate without urinary obstruction and other lower urinary tract symptoms (LUTS)     Memory difficulties     Other and unspecified hyperlipidemia        Past Surgical History:  Past Surgical History:   Procedure Laterality Date    HX CATARACT REMOVAL  01/01/2008    Rt    HX COLONOSCOPY  7/15    [place], q 10 yrs    HX HERNIA REPAIR  01/01/1980       Medication:  Prior to Admission medications    Medication Sig Start Date End Date Taking? Authorizing Provider   hydroCHLOROthiazide (HYDRODIURIL) 12.5 mg tablet Take 12.5 mg by mouth daily.    Yes Provider, Historical   methIMAzole (TAPAZOLE) 5 mg tablet Take 2 tabs daily for 4 weeks, then reduce to 1 tab daily therafter  Patient taking differently: 1 tab daily therafter 12/5/19  Yes Wes Neal MD   valsartan (DIOVAN) 320 mg tablet  4/9/19  Yes Provider, Historical   peg 400-propylene glycol (SYSTANE, PROPYLENE GLYCOL,) 0.4-0.3 % drop Apply 1 Drop to eye. Several times per day   Yes Provider, Historical   VIT A/VIT C/VIT E/ZINC/COPPER (PRESERVISION AREDS PO) Take  by mouth two (2) times a day. Yes Provider, Historical   irbesartan (AVAPRO) 300 mg tablet TAKE 1 TABLET BY MOUTH EVERY DAY FOR BLOOD PRESSURE 9/11/17  Yes Provider, Historical   cholecalciferol, VITAMIN D3, (VITAMIN D3) 5,000 unit tab tablet Take  by mouth two (2) times a day. Yes Provider, Historical   amLODIPine (NORVASC) 10 mg tablet Take  by mouth daily. Yes Provider, Historical   atorvastatin (LIPITOR) 10 mg tablet Take  by mouth daily. Yes Provider, Historical   tamsulosin (FLOMAX) 0.4 mg capsule Take 0.4 mg by mouth daily. Yes Provider, Historical   cetirizine (ZYRTEC) 10 mg tablet TAKE 1 TABLET BY MOUTH ONCE DAILY FOR 7 DAYS AND THEN AS NEEDED 11/21/19   Christine No, NP   tadalafil (CIALIS) 10 mg tablet TAKE 1 TABLET BY MOUTH ONCE DAILY 10/18/19   Provider, Historical   hydroCHLOROthiazide (HYDRODIURIL) 25 mg tablet TAKE 1 TABLET BY MOUTH EVERY DAY 9/19/19   Provider, Historical   aspirin delayed-release 81 mg tablet Take  by mouth every fourty-eight (48) hours. Provider, Historical   B.infantis-B.ani-B.long-B.bifi 10-15 mg TbEC Take  by mouth daily. 1 tab every other day    Provider, Historical       Allergies:  No Known Allergies    Social History:  Social History     Tobacco Use    Smoking status: Never Smoker    Smokeless tobacco: Never Used   Substance Use Topics    Alcohol use:  Yes     Alcohol/week: 1.7 standard drinks     Types: 2 Standard drinks or equivalent per week     Comment: social    Drug use: Not on file       Family History:  Family History   Problem Relation Age of Onset    Hypertension Mother     Stroke Mother     Hypertension Father     Stroke Father     Breast Cancer Sister        ROS:  Total of 12 systems reviewed as follows:  POSITIVE= bolded text  Negative = text not bolded       General:  fever, chills, sweats, generalized weakness, weight loss/gain, loss of appetite   Eyes:    blurred vision, eye pain, loss of vision, double vision  ENT:    rhinorrhea, pharyngitis   Respiratory:  cough, sputum production, SOB, TUTTLE, wheezing, pleuritic pain   Cardiology:   chest pain, palpitations, orthopnea, PND, edema, syncope   Gastrointestinal:  abdominal pain , N/V, diarrhea, dysphagia, constipation, bleeding   Genitourinary:  frequency, urgency, dysuria, hematuria, incontinence, prostatism   Muskuloskeletal: arthralgia, myalgia, back pain  Hematology:   easy bruising, nose or gum bleeding, lymphadenopathy   Dermatological: rash, ulceration, pruritis, color change / jaundice  Endocrine:   hot flashes or polydipsia   Neurological:  headache, dizziness, confusion, focal weakness, paresthesia, speech difficulties, memory loss, gait difficulty  Psychological: feelings of anxiety, depression, agitation      PHYSICAL EXAM:  Patient Vitals for the past 24 hrs:   Temp Pulse Resp BP SpO2   05/03/21 0847 98.5 °F (36.9 °C) 84 20 127/61 95 %   05/03/21 0832 98.5 °F (36.9 °C) 84 20 121/60 96 %   05/03/21 0806 97.8 °F (36.6 °C) 84 20 138/72 96 %   05/02/21 2325 98.3 °F (36.8 °C) 89 20 (!) 158/77 98 %   05/02/21 2055 98.1 °F (36.7 °C) 81 16 (!) 165/78 97 %   05/02/21 1805 98.4 °F (36.9 °C) 85 16 126/81 96 %       General:    Alert, cooperative, no distress, appears stated age. HEENT: Atraumatic, anicteric sclerae, pink conjunctivae     No oral ulcers, mucosa moist, throat clear, dentition fair  Neck:  Supple, symmetrical;   thyroid non tender  Lungs:   Clear to auscultation bilaterally. No wheezing or rhonchi. No rales.   Chest wall:  No tenderness. No accessory muscle use. Heart:   Regular rhythm. No  murmur. No edema  Abdomen:   Soft, non-tender. Not distended. Bowel sounds normal  Extremities: No cyanosis. No clubbing      Capillary refill normal,  Radial pulse 2+,  DP 1+  Skin:     Not pale. Not jaundiced. No rashes   Psych:  Not depressed. Not anxious or agitated. Neurologic: EOMs intact. No facial asymmetry. No aphasia or slurred speech. Symmetrical strength, Sensation grossly intact. Alert and oriented     Lab Data Reviewed:    Recent Results (from the past 24 hour(s))   CBC WITH AUTOMATED DIFF    Collection Time: 05/02/21  7:45 PM   Result Value Ref Range    WBC 6.9 4.1 - 11.1 K/uL    RBC 4.20 4. 10 - 5.70 M/uL    HGB 13.6 12.1 - 17.0 g/dL    HCT 39.8 36.6 - 50.3 %    MCV 94.8 80.0 - 99.0 FL    MCH 32.4 26.0 - 34.0 PG    MCHC 34.2 30.0 - 36.5 g/dL    RDW 12.8 11.5 - 14.5 %    PLATELET 116 813 - 689 K/uL    MPV 9.0 8.9 - 12.9 FL    NRBC 0.0 0  WBC    ABSOLUTE NRBC 0.00 0.00 - 0.01 K/uL    NEUTROPHILS 80 (H) 32 - 75 %    LYMPHOCYTES 11 (L) 12 - 49 %    MONOCYTES 7 5 - 13 %    EOSINOPHILS 2 0 - 7 %    BASOPHILS 0 0 - 1 %    IMMATURE GRANULOCYTES 0 0.0 - 0.5 %    ABS. NEUTROPHILS 5.5 1.8 - 8.0 K/UL    ABS. LYMPHOCYTES 0.8 0.8 - 3.5 K/UL    ABS. MONOCYTES 0.5 0.0 - 1.0 K/UL    ABS. EOSINOPHILS 0.1 0.0 - 0.4 K/UL    ABS. BASOPHILS 0.0 0.0 - 0.1 K/UL    ABS. IMM.  GRANS. 0.0 0.00 - 0.04 K/UL    DF SMEAR SCANNED      RBC COMMENTS NORMOCYTIC, NORMOCHROMIC     METABOLIC PANEL, COMPREHENSIVE    Collection Time: 05/02/21  7:45 PM   Result Value Ref Range    Sodium 142 136 - 145 mmol/L    Potassium 3.9 3.5 - 5.1 mmol/L    Chloride 103 97 - 108 mmol/L    CO2 30 21 - 32 mmol/L    Anion gap 9 5 - 15 mmol/L    Glucose 133 (H) 65 - 100 mg/dL    BUN 28 (H) 6 - 20 MG/DL    Creatinine 1.75 (H) 0.70 - 1.30 MG/DL    BUN/Creatinine ratio 16 12 - 20      GFR est AA 45 (L) >60 ml/min/1.73m2    GFR est non-AA 37 (L) >60 ml/min/1.73m2    Calcium 8.7 8.5 - 10.1 MG/DL    Bilirubin, total 0.5 0.2 - 1.0 MG/DL    ALT (SGPT) 26 12 - 78 U/L    AST (SGOT) 27 15 - 37 U/L    Alk. phosphatase 123 (H) 45 - 117 U/L    Protein, total 7.1 6.4 - 8.2 g/dL    Albumin 3.8 3.5 - 5.0 g/dL    Globulin 3.3 2.0 - 4.0 g/dL    A-G Ratio 1.2 1.1 - 2.2         EKG:  pending    Radiology:  XR ANKLE LT AP/LAT   Final Result   Status post reduction and casting for fracture dislocation of the ankle as   above. XR ANKLE LT MIN 3 V   Final Result   Acute left trimalleolar fracture subluxation as described. 2   intra-articular loose bodies        CXR:  Pending    Care Plan discussed with:   Patient x    Family     RN x     x    Consultant      Expected  Disposition:   Home with Family x   HH/PT/OT/RN    SNF/LTC    DOMINGO      TOTAL TIME:  61 Minutes      Comments    x Reviewed previous records   >50% of visit spent in counseling and coordination of care x Discussion with patient and/or family and questions answered       _______________________________________________________  Given the patient's current clinical presentation, I have a high level of concern for decompensation if discharged from the emergency department. Complex decision making was performed, which includes reviewing the patient's available past medical records, laboratory results, and x-ray films. My assessment of this patient's clinical condition and my plan of care is as follows.     ASSESSMENT / PLAN    Principal Problem:    Closed trimalleolar fracture of ankle, left, initial encounter (5/2/2021)  Medically Stable  CXR / EKG today preop  Bedrest / Morphine    Active Problems:    Benign hypertensive heart disease without heart failure ()  Formulary ARB - Cozaar  Cont Norvasc, HCTZ      Benign non-nodular prostatic hyperplasia without lower urinary tract symptoms ()  Maintain Flomax  Monitor for retention christopher-op      Graves disease (5/3/2021)  Followed by [facility] - Dr. Génesis Park March 2021  Dx as Subclinical Hyperthyroidism,  Has been on treatment since 2016  Cont tx Tapazole 5mg daily  TFTs were wnel      Idiopathic chronic gout of multiple sites without tophus (3/22/2018)  No acute c/o  No current tx       Mixed hyperlipidemia ()  Cont home tx Lipitor hs        SAFETY:   Code Status:Full  DVT prophylaxis:contraindicated, pre-op  Stress Ulcer prophylaxis: Pepcid  Bladder catheter:no  Family Contact Info:  Primary Emergency Contact: Maranda KnutsonAnthony Phone: [phone]  Bedded: [facility] Room 134/01  Disposition: TBD, likely will need Rehab stay  Admission status:  Inpatient    -Tentative plan of care discussed with patient / family, who demonstrated understanding and is in agreement to the above  -Case was reviewed with the ED Provider, MD Surya Parson MD  [facility] Hospitalist  [phone]

## 2021-05-03 NOTE — PROGRESS NOTES
Care Management Interventions  PCP Verified by CM: Darrel Calderon NP )  Last Visit to PCP: 08/17/20  Palliative Care Criteria Met (RRAT>21 & CHF Dx)?: No(No MD order)  Mode of Transport at Discharge: Other (see comment)  Transition of Care Consult (CM Consult): Discharge Planning  Occupational Therapy Consult: No  Speech Therapy Consult: No  Current Support Network: Lives with Spouse  Confirm Follow Up Transport: Family  The Plan for Transition of Care is Related to the Following Treatment Goals : Tx L ankle fx   Discharge Location  Discharge Placement: Home    Patient lives at home with his spouse and his mother in law. He states his mother in law does NOT require any assistance from him. He states he is not sure if he has a ACP document but he would like to name his wife Nunu Anders as his primary healthcare decision maker. Instructed patient to have his wife bring in the document next time she visits. Patient states he normally goes to [place] office for his care but visits a doctor in [place] to get a physical every year. Patient does NOT use any DME at home and is very independent at home. He stated he jumped off his  to prevent him from going into a body of water. Care management information given to patient instructed him to call if he has any questions or concerns.      Reason for Admission:  L ankle fx                      RUR Score:          8% LOW            Plan for utilizing home health:      TBD     PCP: First and Last name:  Dasha Salazar NP     Name of Practice: [facility]    Are you a current patient: Yes/No: Yes    Approximate date of last visit: 8/17/20   Can you participate in a virtual visit with your PCP: No                     Current Advanced Directive/Advance Care Plan: Full Code      Healthcare Decision Maker:   Click here to complete [address] including selection of the Healthcare Decision Maker Relationship (ie \"Primary\")                             Transition of Care Plan:               Home when medically stable

## 2021-05-03 NOTE — PROGRESS NOTES
Problem: Falls - Risk of  Goal: *Absence of Falls  Description: Document Grover Tony Fall Risk and appropriate interventions in the flowsheet.   Outcome: Progressing Towards Goal  Note: Fall Risk Interventions:                                Problem: Patient Education: Go to Patient Education Activity  Goal: Patient/Family Education  Outcome: Progressing Towards Goal     Problem: Pain  Goal: *Control of Pain  Outcome: Progressing Towards Goal     Problem: Impaired Skin Integrity/Pressure Injury Treatment  Goal: *Improvement of Existing Pressure Injury  Outcome: Progressing Towards Goal

## 2021-05-04 ENCOUNTER — ANESTHESIA (OUTPATIENT)
Dept: SURGERY | Age: 86
DRG: 493 | End: 2021-05-04
Payer: MEDICARE

## 2021-05-04 ENCOUNTER — APPOINTMENT (OUTPATIENT)
Dept: GENERAL RADIOLOGY | Age: 86
DRG: 493 | End: 2021-05-04
Attending: ORTHOPAEDIC SURGERY
Payer: MEDICARE

## 2021-05-04 PROBLEM — S82.852A CLOSED TRIMALLEOLAR FRACTURE OF ANKLE, LEFT, INITIAL ENCOUNTER: Status: RESOLVED | Noted: 2021-05-02 | Resolved: 2021-05-04

## 2021-05-04 LAB
ANION GAP SERPL CALC-SCNC: 9 MMOL/L (ref 5–15)
BASOPHILS # BLD: 0 K/UL (ref 0–0.1)
BASOPHILS NFR BLD: 0 % (ref 0–1)
BUN SERPL-MCNC: 31 MG/DL (ref 6–20)
BUN/CREAT SERPL: 20 (ref 12–20)
CALCIUM SERPL-MCNC: 8.3 MG/DL (ref 8.5–10.1)
CHLORIDE SERPL-SCNC: 102 MMOL/L (ref 97–108)
CO2 SERPL-SCNC: 26 MMOL/L (ref 21–32)
CREAT SERPL-MCNC: 1.58 MG/DL (ref 0.7–1.3)
DIFFERENTIAL METHOD BLD: ABNORMAL
EOSINOPHIL # BLD: 0.1 K/UL (ref 0–0.4)
EOSINOPHIL NFR BLD: 2 % (ref 0–7)
ERYTHROCYTE [DISTWIDTH] IN BLOOD BY AUTOMATED COUNT: 12.6 % (ref 11.5–14.5)
GLUCOSE SERPL-MCNC: 116 MG/DL (ref 65–100)
HCT VFR BLD AUTO: 34.6 % (ref 36.6–50.3)
HGB BLD-MCNC: 11.6 G/DL (ref 12.1–17)
IMM GRANULOCYTES # BLD AUTO: 0 K/UL (ref 0–0.04)
IMM GRANULOCYTES NFR BLD AUTO: 0 % (ref 0–0.5)
LYMPHOCYTES # BLD: 0.9 K/UL (ref 0.8–3.5)
LYMPHOCYTES NFR BLD: 13 % (ref 12–49)
MAGNESIUM SERPL-MCNC: 1.9 MG/DL (ref 1.6–2.4)
MCH RBC QN AUTO: 31.9 PG (ref 26–34)
MCHC RBC AUTO-ENTMCNC: 33.5 G/DL (ref 30–36.5)
MCV RBC AUTO: 95.1 FL (ref 80–99)
MONOCYTES # BLD: 0.9 K/UL (ref 0–1)
MONOCYTES NFR BLD: 14 % (ref 5–13)
NEUTS SEG # BLD: 4.8 K/UL (ref 1.8–8)
NEUTS SEG NFR BLD: 71 % (ref 32–75)
NRBC # BLD: 0 K/UL (ref 0–0.01)
NRBC BLD-RTO: 0 PER 100 WBC
PLATELET # BLD AUTO: 199 K/UL (ref 150–400)
PMV BLD AUTO: 9.3 FL (ref 8.9–12.9)
POTASSIUM SERPL-SCNC: 3.9 MMOL/L (ref 3.5–5.1)
RBC # BLD AUTO: 3.64 M/UL (ref 4.1–5.7)
SODIUM SERPL-SCNC: 137 MMOL/L (ref 136–145)
WBC # BLD AUTO: 6.7 K/UL (ref 4.1–11.1)

## 2021-05-04 PROCEDURE — 76010000153 HC OR TIME 1.5 TO 2 HR: Performed by: ORTHOPAEDIC SURGERY

## 2021-05-04 PROCEDURE — C1769 GUIDE WIRE: HCPCS | Performed by: ORTHOPAEDIC SURGERY

## 2021-05-04 PROCEDURE — 76210000063 HC OR PH I REC FIRST 0.5 HR: Performed by: ORTHOPAEDIC SURGERY

## 2021-05-04 PROCEDURE — 93005 ELECTROCARDIOGRAM TRACING: CPT

## 2021-05-04 PROCEDURE — 73600 X-RAY EXAM OF ANKLE: CPT

## 2021-05-04 PROCEDURE — 77030040934 HC CATH DIAG DXTERITY MEDT -A: Performed by: ORTHOPAEDIC SURGERY

## 2021-05-04 PROCEDURE — 77030040922 HC BLNKT HYPOTHRM STRY -A: Performed by: ORTHOPAEDIC SURGERY

## 2021-05-04 PROCEDURE — C1713 ANCHOR/SCREW BN/BN,TIS/BN: HCPCS | Performed by: ORTHOPAEDIC SURGERY

## 2021-05-04 PROCEDURE — 77030000032 HC CUF TRNQT ZIMM -B: Performed by: ORTHOPAEDIC SURGERY

## 2021-05-04 PROCEDURE — 83735 ASSAY OF MAGNESIUM: CPT

## 2021-05-04 PROCEDURE — 74011250637 HC RX REV CODE- 250/637: Performed by: ORTHOPAEDIC SURGERY

## 2021-05-04 PROCEDURE — 74011250636 HC RX REV CODE- 250/636: Performed by: ANESTHESIOLOGY

## 2021-05-04 PROCEDURE — 74011250636 HC RX REV CODE- 250/636: Performed by: ORTHOPAEDIC SURGERY

## 2021-05-04 PROCEDURE — 77030016458 HC BIT DRL CANN1 SYNT -F: Performed by: ORTHOPAEDIC SURGERY

## 2021-05-04 PROCEDURE — 76060000034 HC ANESTHESIA 1.5 TO 2 HR: Performed by: ORTHOPAEDIC SURGERY

## 2021-05-04 PROCEDURE — 0QSK04Z REPOSITION LEFT FIBULA WITH INTERNAL FIXATION DEVICE, OPEN APPROACH: ICD-10-PCS | Performed by: ORTHOPAEDIC SURGERY

## 2021-05-04 PROCEDURE — 74011000250 HC RX REV CODE- 250: Performed by: ANESTHESIOLOGY

## 2021-05-04 PROCEDURE — 77030003862 HC BIT DRL SYNT -B: Performed by: ORTHOPAEDIC SURGERY

## 2021-05-04 PROCEDURE — 85025 COMPLETE CBC W/AUTO DIFF WBC: CPT

## 2021-05-04 PROCEDURE — 77030002916 HC SUT ETHLN J&J -A: Performed by: ORTHOPAEDIC SURGERY

## 2021-05-04 PROCEDURE — 77010033678 HC OXYGEN DAILY

## 2021-05-04 PROCEDURE — 74011000258 HC RX REV CODE- 258: Performed by: ORTHOPAEDIC SURGERY

## 2021-05-04 PROCEDURE — 2709999900 HC NON-CHARGEABLE SUPPLY: Performed by: ORTHOPAEDIC SURGERY

## 2021-05-04 PROCEDURE — 36415 COLL VENOUS BLD VENIPUNCTURE: CPT

## 2021-05-04 PROCEDURE — 0QSH04Z REPOSITION LEFT TIBIA WITH INTERNAL FIXATION DEVICE, OPEN APPROACH: ICD-10-PCS | Performed by: ORTHOPAEDIC SURGERY

## 2021-05-04 PROCEDURE — 77030031139 HC SUT VCRL2 J&J -A: Performed by: ORTHOPAEDIC SURGERY

## 2021-05-04 PROCEDURE — 65270000029 HC RM PRIVATE

## 2021-05-04 PROCEDURE — 80048 BASIC METABOLIC PNL TOTAL CA: CPT

## 2021-05-04 RX ORDER — CEFAZOLIN SODIUM IN 0.9 % NACL 2 G/50 ML
2 INTRAVENOUS SOLUTION, PIGGYBACK (ML) INTRAVENOUS EVERY 8 HOURS
Status: COMPLETED | OUTPATIENT
Start: 2021-05-04 | End: 2021-05-05

## 2021-05-04 RX ORDER — BUPIVACAINE HYDROCHLORIDE 5 MG/ML
20 INJECTION, SOLUTION EPIDURAL; INTRACAUDAL ONCE
Status: DISCONTINUED | OUTPATIENT
Start: 2021-05-04 | End: 2021-05-04 | Stop reason: HOSPADM

## 2021-05-04 RX ORDER — SODIUM CHLORIDE, SODIUM LACTATE, POTASSIUM CHLORIDE, CALCIUM CHLORIDE 600; 310; 30; 20 MG/100ML; MG/100ML; MG/100ML; MG/100ML
25 INJECTION, SOLUTION INTRAVENOUS CONTINUOUS
Status: DISCONTINUED | OUTPATIENT
Start: 2021-05-04 | End: 2021-05-04 | Stop reason: HOSPADM

## 2021-05-04 RX ORDER — PROPOFOL 10 MG/ML
INJECTION, EMULSION INTRAVENOUS AS NEEDED
Status: DISCONTINUED | OUTPATIENT
Start: 2021-05-04 | End: 2021-05-04 | Stop reason: HOSPADM

## 2021-05-04 RX ORDER — SODIUM CHLORIDE 0.9 % (FLUSH) 0.9 %
5-40 SYRINGE (ML) INJECTION EVERY 8 HOURS
Status: DISCONTINUED | OUTPATIENT
Start: 2021-05-04 | End: 2021-05-04 | Stop reason: HOSPADM

## 2021-05-04 RX ORDER — SODIUM CHLORIDE 0.9 % (FLUSH) 0.9 %
5-40 SYRINGE (ML) INJECTION AS NEEDED
Status: DISCONTINUED | OUTPATIENT
Start: 2021-05-04 | End: 2021-05-04 | Stop reason: HOSPADM

## 2021-05-04 RX ORDER — FENTANYL CITRATE 50 UG/ML
50 INJECTION, SOLUTION INTRAMUSCULAR; INTRAVENOUS
Status: DISCONTINUED | OUTPATIENT
Start: 2021-05-04 | End: 2021-05-04 | Stop reason: HOSPADM

## 2021-05-04 RX ORDER — FENTANYL CITRATE 50 UG/ML
INJECTION, SOLUTION INTRAMUSCULAR; INTRAVENOUS AS NEEDED
Status: DISCONTINUED | OUTPATIENT
Start: 2021-05-04 | End: 2021-05-04 | Stop reason: HOSPADM

## 2021-05-04 RX ORDER — SODIUM CHLORIDE 9 MG/ML
INJECTION, SOLUTION INTRAVENOUS
Status: DISCONTINUED | OUTPATIENT
Start: 2021-05-04 | End: 2021-05-04 | Stop reason: HOSPADM

## 2021-05-04 RX ORDER — DIPHENHYDRAMINE HYDROCHLORIDE 50 MG/ML
12.5 INJECTION, SOLUTION INTRAMUSCULAR; INTRAVENOUS
Status: DISCONTINUED | OUTPATIENT
Start: 2021-05-04 | End: 2021-05-04 | Stop reason: HOSPADM

## 2021-05-04 RX ORDER — EPHEDRINE SULFATE/0.9% NACL/PF 50 MG/5 ML
SYRINGE (ML) INTRAVENOUS AS NEEDED
Status: DISCONTINUED | OUTPATIENT
Start: 2021-05-04 | End: 2021-05-04 | Stop reason: HOSPADM

## 2021-05-04 RX ORDER — ROPIVACAINE HYDROCHLORIDE 5 MG/ML
INJECTION, SOLUTION EPIDURAL; INFILTRATION; PERINEURAL
Status: SHIPPED | OUTPATIENT
Start: 2021-05-04 | End: 2021-05-04

## 2021-05-04 RX ORDER — LIDOCAINE HYDROCHLORIDE 20 MG/ML
INJECTION, SOLUTION EPIDURAL; INFILTRATION; INTRACAUDAL; PERINEURAL AS NEEDED
Status: DISCONTINUED | OUTPATIENT
Start: 2021-05-04 | End: 2021-05-04 | Stop reason: HOSPADM

## 2021-05-04 RX ORDER — GUAIFENESIN 100 MG/5ML
81 LIQUID (ML) ORAL 2 TIMES DAILY
Status: DISCONTINUED | OUTPATIENT
Start: 2021-05-05 | End: 2021-05-06 | Stop reason: HOSPADM

## 2021-05-04 RX ORDER — CEFAZOLIN SODIUM IN 0.9 % NACL 2 G/50 ML
2 INTRAVENOUS SOLUTION, PIGGYBACK (ML) INTRAVENOUS ONCE
Status: COMPLETED | OUTPATIENT
Start: 2021-05-04 | End: 2021-05-04

## 2021-05-04 RX ORDER — HYDROMORPHONE HYDROCHLORIDE 2 MG/ML
0.5 INJECTION, SOLUTION INTRAMUSCULAR; INTRAVENOUS; SUBCUTANEOUS
Status: DISCONTINUED | OUTPATIENT
Start: 2021-05-04 | End: 2021-05-04 | Stop reason: HOSPADM

## 2021-05-04 RX ORDER — SODIUM CHLORIDE, SODIUM LACTATE, POTASSIUM CHLORIDE, CALCIUM CHLORIDE 600; 310; 30; 20 MG/100ML; MG/100ML; MG/100ML; MG/100ML
100 INJECTION, SOLUTION INTRAVENOUS CONTINUOUS
Status: DISCONTINUED | OUTPATIENT
Start: 2021-05-04 | End: 2021-05-04 | Stop reason: HOSPADM

## 2021-05-04 RX ADMIN — HYDROCHLOROTHIAZIDE 25 MG: 25 TABLET ORAL at 14:44

## 2021-05-04 RX ADMIN — Medication 5 MG: at 12:11

## 2021-05-04 RX ADMIN — MEPIVACAINE HYDROCHLORIDE 5 ML: 15 INJECTION, SOLUTION EPIDURAL; INFILTRATION at 11:33

## 2021-05-04 RX ADMIN — CEFAZOLIN 2 G: 10 INJECTION, POWDER, FOR SOLUTION INTRAVENOUS at 19:44

## 2021-05-04 RX ADMIN — Medication 10 ML: at 14:44

## 2021-05-04 RX ADMIN — ROPIVACAINE HYDROCHLORIDE 20 ML: 5 INJECTION, SOLUTION EPIDURAL; INFILTRATION; PERINEURAL at 11:33

## 2021-05-04 RX ADMIN — PROPOFOL 150 MG: 10 INJECTION, EMULSION INTRAVENOUS at 12:01

## 2021-05-04 RX ADMIN — LIDOCAINE HYDROCHLORIDE 75 MG: 20 INJECTION, SOLUTION EPIDURAL; INFILTRATION; INTRACAUDAL; PERINEURAL at 12:01

## 2021-05-04 RX ADMIN — SODIUM CHLORIDE: 9 INJECTION, SOLUTION INTRAVENOUS at 13:18

## 2021-05-04 RX ADMIN — I-VITE, TAB 1000-60-2MG (60/BT) 1 TABLET: TAB at 14:45

## 2021-05-04 RX ADMIN — Medication 5 MG: at 12:14

## 2021-05-04 RX ADMIN — METHIMAZOLE 5 MG: 5 TABLET ORAL at 14:45

## 2021-05-04 RX ADMIN — ATORVASTATIN CALCIUM 10 MG: 10 TABLET, FILM COATED ORAL at 21:27

## 2021-05-04 RX ADMIN — Medication 10 MG: at 12:05

## 2021-05-04 RX ADMIN — SODIUM CHLORIDE, POTASSIUM CHLORIDE, SODIUM LACTATE AND CALCIUM CHLORIDE: 600; 310; 30; 20 INJECTION, SOLUTION INTRAVENOUS at 11:11

## 2021-05-04 RX ADMIN — FENTANYL CITRATE 50 MCG: 50 INJECTION, SOLUTION INTRAMUSCULAR; INTRAVENOUS at 11:30

## 2021-05-04 RX ADMIN — CEFAZOLIN 2 G: 1 INJECTION, POWDER, FOR SOLUTION INTRAMUSCULAR; INTRAVENOUS; PARENTERAL at 11:55

## 2021-05-04 RX ADMIN — FENTANYL CITRATE 50 MCG: 50 INJECTION, SOLUTION INTRAMUSCULAR; INTRAVENOUS at 11:24

## 2021-05-04 RX ADMIN — AMLODIPINE BESYLATE 10 MG: 10 TABLET ORAL at 14:45

## 2021-05-04 RX ADMIN — TAMSULOSIN HYDROCHLORIDE 0.4 MG: 0.4 CAPSULE ORAL at 14:45

## 2021-05-04 RX ADMIN — Medication 5000 UNITS: at 14:44

## 2021-05-04 RX ADMIN — ROPIVACAINE HYDROCHLORIDE 15 ML: 5 INJECTION, SOLUTION EPIDURAL; INFILTRATION; PERINEURAL at 11:42

## 2021-05-04 RX ADMIN — LOSARTAN POTASSIUM 100 MG: 100 TABLET, FILM COATED ORAL at 14:44

## 2021-05-04 RX ADMIN — Medication 10 ML: at 21:28

## 2021-05-04 RX ADMIN — ACETAMINOPHEN 650 MG: 325 TABLET ORAL at 19:49

## 2021-05-04 RX ADMIN — I-VITE, TAB 1000-60-2MG (60/BT) 1 TABLET: TAB at 18:12

## 2021-05-04 NOTE — PROGRESS NOTES
Bedside and Verbal shift change report given to JESUS Dc RN (oncoming nurse) by Robbin Ojeda RN   (offgoing nurse). Report included the following information     Bajwa score 3  Bed/chair alarm yes in use. If in use it is set at the highest volume. Intravenous fluids were administered, IV push only,   Patient Vitals for the past 12 hrs:   Temp Pulse Resp BP SpO2   05/03/21 1634 98.4 °F (36.9 °C) 87 18 125/68 96 %   05/03/21 1445 98.5 °F (36.9 °C) 89 20 125/73 97 %   05/03/21 0847 98.5 °F (36.9 °C) 84 20 127/61 95 %     No flowsheet data found. Lab results reviewed. For significant abnormal values and values requiring intervention, see assessment and plan. Patient requested pain medication once in am prior to MD Guerrero manipulating left ankle and splinting. Patient tolerated the procedure well. Patient pain averaged a 1 1/2 for the remainder of the shift. Patient will need to be NPO after midnight for surgery scheduled in am around 11:30.

## 2021-05-04 NOTE — BRIEF OP NOTE
Brief Postoperative Note    Patient: Jorge Ordaz  YOB: 1935  MRN: [id number]    Date of Procedure: 5/4/2021     Pre-Op Diagnosis: Tri-malleolar left ankle fracture    Post-Op Diagnosis: Same as preoperative diagnosis. Procedure(s):  LEFT ANKLE OPEN REDUCTION INTERNAL FIXATION (URGENT) Tri- malleolar fracture left    Surgeon(s):  Kaleb Matute MD    Surgical Assistant: Surg Asst-1: Darrold Bernheim    Anesthesia: General     Estimated Blood Loss (mL): Minimal    Complications: None    Specimens: * No specimens in log *     Implants: * No implants in log *    Drains: * No LDAs found *    Findings: Displaced trimalleolar fracture.     Electronically Signed by Kemi Landon MD on 5/4/2021 at 1:59 PM

## 2021-05-04 NOTE — ROUTINE PROCESS
TRANSFER - OUT REPORT:    Verbal report given to turner Glass rn(name) on Marcha Camera  being transferred to  134(unit) for routine post - op       Report consisted of patients Situation, Background, Assessment and   Recommendations(SBAR). Information from the following report(s) SBAR, OR Summary, Procedure Summary, Intake/Output and MAR was reviewed with the receiving nurse. Lines:   Peripheral IV 05/02/21 Right Antecubital (Active)   Site Assessment Clean, dry, & intact 05/04/21 1357   Phlebitis Assessment 0 05/04/21 1357   Infiltration Assessment 0 05/04/21 1357   Dressing Status Clean, dry, & intact 05/04/21 1357   Dressing Type Transparent 05/04/21 1357   Hub Color/Line Status Pink 05/04/21 1222   Alcohol Cap Used Yes 05/04/21 0745        Opportunity for questions and clarification was provided.       Patient transported with:   Registered Nurse 22:50

## 2021-05-04 NOTE — ANESTHESIA PROCEDURE NOTES
Peripheral Block    Start time: 5/4/2021 11:34 AM  End time: 5/4/2021 11:42 AM  Performed by: Gloria Claudio MD  Authorized by: Gloria Claudio MD       Pre-procedure: Indications: at surgeon's request and post-op pain management    Preanesthetic Checklist: patient identified, risks and benefits discussed, site marked, timeout performed, anesthesia consent given and patient being monitored    Timeout Time: 11:34          Block Type:   Block Type: Adductor canal  Laterality:  Left  Monitoring:  Standard ASA monitoring, continuous pulse ox, frequent vital sign checks, heart rate, responsive to questions and oxygen  Injection Technique:  Single shot  Procedures: ultrasound guided    Patient Position: supine  Prep: chlorhexidine    Location:  Lower thigh  Needle Type:  Stimuplex  Needle Gauge:  21 G  Needle Localization:  Anatomical landmarks  Medication Injected:  Ropivacaine (PF) (NAROPIN)(0.5%) 5 mg/mL injection, 15 mL  Med Admin Time: 5/4/2021 11:42 AM    Assessment:  Number of attempts:  1  Injection Assessment:  Incremental injection every 5 mL, local visualized surrounding nerve on ultrasound, negative aspiration for blood, no paresthesia, no intravascular symptoms and ultrasound image on chart  Patient tolerance:  Patient tolerated the procedure well with no immediate complications  No pain or paresthesia noted during placement or injection.

## 2021-05-04 NOTE — PROGRESS NOTES
Bedside report melo Pinon RN for pt. To go to OR. Partial dentures and watch removed. Pt. Remained NPO after midnight.

## 2021-05-04 NOTE — ANESTHESIA PREPROCEDURE EVALUATION
Relevant Problems   ENDOCRINE   (+) Idiopathic chronic gout of multiple sites without tophus       Anesthetic History               Review of Systems / Medical History  Patient summary reviewed, nursing notes reviewed and pertinent labs reviewed    Pulmonary              Pertinent negatives: No smoker     Neuro/Psych             Comments: Memory difficulties Cardiovascular    Hypertension          Hyperlipidemia  Pertinent negatives: No CHF (hypertensive heart disease with h/o CHF)       GI/Hepatic/Renal     GERD          Comments: 2 drinks/week Endo/Other      Hypothyroidism (Graves Dz)  Arthritis  Pertinent negatives: No obesity   Other Findings   Comments: BPH              Anesthetic Plan    ASA: 2  Anesthesia type: general      Post-op pain plan if not by surgeon: peripheral nerve block single    Induction: Intravenous  Anesthetic plan and risks discussed with: Patient      General anesthesia versus block/MAC versus GA/block discussed. Risks of general anesthesia reviewed (see anesthesia consent form), and risks of nerve block including but not limited to pain, bleeding, infection, block failure and nerve damage discussed. Patient chooses  . All questions answered. Consent reviewed and signed. Plan GA/lMA with poplitieal/adductor canal blocks.

## 2021-05-04 NOTE — PROGRESS NOTES
Problem: Falls - Risk of  Goal: *Absence of Falls  Description: Document Juan Davis Fall Risk and appropriate interventions in the flowsheet.   Outcome: Progressing Towards Goal  Note: Fall Risk Interventions:            Medication Interventions: Evaluate medications/consider consulting pharmacy    Elimination Interventions: Bed/chair exit alarm, Call light in reach, Urinal in reach    History of Falls Interventions: Consult care management for discharge planning         Problem: Pain  Goal: *Control of Pain  Outcome: Progressing Towards Goal     Problem: Impaired Skin Integrity/Pressure Injury Treatment  Goal: *Improvement of Existing Pressure Injury  Outcome: Progressing Towards Goal

## 2021-05-04 NOTE — PROGRESS NOTES
Bedside shift change report given to ALDAIR Fontaine RN (oncoming nurse) by Jose Dc RN (offgoing nurse). Report included the following information Kardex.

## 2021-05-04 NOTE — ANESTHESIA POSTPROCEDURE EVALUATION
Procedure(s):  LEFT ANKLE OPEN REDUCTION INTERNAL FIXATION (URGENT). general    Anesthesia Post Evaluation      Multimodal analgesia: multimodal analgesia used between 6 hours prior to anesthesia start to PACU discharge  Patient location during evaluation: bedside  Patient participation: complete - patient participated  Level of consciousness: awake and alert  Pain score: 0  Pain management: adequate  Airway patency: patent  Anesthetic complications: no  Cardiovascular status: acceptable  Respiratory status: acceptable  Hydration status: acceptable  Post anesthesia nausea and vomiting:  none  Final Post Anesthesia Temperature Assessment:  Normothermia (36.0-37.5 degrees C)      INITIAL Post-op Vital signs:   Vitals Value Taken Time   /73 05/04/21 1405   Temp 36.7 °C (98 °F) 05/04/21 1353   Pulse 94 05/04/21 1408   Resp 11 05/04/21 1408   SpO2 92 % 05/04/21 1408   Vitals shown include unvalidated device data.

## 2021-05-04 NOTE — PROGRESS NOTES
[facility]  Hospitalist Progress Note    NAME: Pedro Luis Mabry   :  1935   MRN:  [id number]     Total duration of encounter: 2 days      Interim Hospital Summary: 81 y.o. male who presented on 2021 with Closed trimalleolar fracture of ankle, left, initial encounter. He has a past medical history of Acute gouty arthropathy, Benign hypertensive heart disease without heart failure, Hypertrophy of prostate without urinary obstruction and other lower urinary tract symptoms (LUTS), Memory difficulties, and Other and unspecified hyperlipidemia. PT presents with traumatic injury to  L ankle as per H&P. Consulted by Dr. Gyala Wynne with   ORIF of tri-maleolar fracture today        Subjective:     Chief Complaint / Reason for Physician Visit  \"better\".   Discussed with RN   Less pain  Ankle still numb    Review of Systems:  Symptom Y/N Comments  Symptom Y/N Comments   Fever/Chills n   Chest Pain n    Poor Appetite n   Edema n    Cough n   Abdominal Pain n    Sputum n   Joint Pain y    SOB/TUTTLE n   Pruritis/Rash n    Nausea/vomit n   Tolerating PT/OT     Diarrhea n   Tolerating Diet n    Constipation n   Other         Current Facility-Administered Medications:     ceFAZolin in 0.9% NS (ANCEF) IVPB soln 2 g, 2 g, IntraVENous, Q8H, McCoig, Elinore Kehr, MD    [START ON 2021] aspirin chewable tablet 81 mg, 81 mg, Oral, BID, McCoig, Elinore Kehr, MD    morphine injection 4 mg, 4 mg, IntraVENous, Q4H PRN, Todd Guerrero MD, 4 mg at 21 9246    amLODIPine (NORVASC) tablet 10 mg, 10 mg, Oral, DAILY, Todd Guerrero MD, 10 mg at 21 1445    atorvastatin (LIPITOR) tablet 10 mg, 10 mg, Oral, QHS, Todd Guerrero MD, 10 mg at 21 211    cholecalciferol (VITAMIN D3) (1000 Units /25 mcg) tablet 5,000 Units, 5,000 Units, Oral, DAILY, McCoig, Elinore Kehr, MD, 5,000 Units at 21 1444    hydroCHLOROthiazide (HYDRODIURIL) tablet 25 mg, 25 mg, Oral, DAILY, Todd Guerrero MD, 25 mg at 21 1444    methIMAzole (TAPAZOLE) tablet 5 mg, 5 mg, Oral, DAILY, Todd Guerrero MD, 5 mg at 05/04/21 1445    tamsulosin (FLOMAX) capsule 0.4 mg, 0.4 mg, Oral, DAILY, Todd Guerrero MD, 0.4 mg at 05/04/21 1445    vitamin C-X-W-lutein-minerals (OCUVITE) tablet 1 Tab, 1 Tab, Oral, BID, Ericka Guerrero MD, 1 Tab at 05/04/21 1812    sodium chloride (NS) flush 5-40 mL, 5-40 mL, IntraVENous, Q8H, Ericka Guerrero MD, Stopped at 05/04/21 1400    sodium chloride (NS) flush 5-40 mL, 5-40 mL, IntraVENous, PRN, Ericka Guerrero MD, 10 mL at 05/04/21 1444    acetaminophen (TYLENOL) tablet 650 mg, 650 mg, Oral, Q6H PRN **OR** acetaminophen (TYLENOL) suppository 650 mg, 650 mg, Rectal, Q6H PRN, Ericka Guerrero MD    polyethylene glycol (MIRALAX) packet 17 g, 17 g, Oral, DAILY PRN, Ericka Guerrero MD    losartan (COZAAR) tablet 100 mg, 100 mg, Oral, DAILY, Todd Guerrero MD, 100 mg at 05/04/21 1444    ondansetron (ZOFRAN) injection 4 mg, 4 mg, IntraVENous, Q4H PRN, Ericka Guerrero MD, 4 mg at 05/03/21 1459    Objective:     VITALS:   Patient Vitals for the past 12 hrs:   Temp Pulse Resp BP SpO2   05/04/21 1810 99.2 °F (37.3 °C) (!) 102 16 (!) 151/76 97 %   05/04/21 1710 98.9 °F (37.2 °C) 94 16 (!) 151/86 97 %   05/04/21 1640 97.8 °F (36.6 °C) 95 16 (!) 152/84 98 %   05/04/21 1610 97.8 °F (36.6 °C) 94 16 (!) 153/83 98 %   05/04/21 1540 97.5 °F (36.4 °C) 94 16 (!) 159/84 96 %   05/04/21 1510 (!) 96.3 °F (35.7 °C) 96 16 (!) 154/77 95 %   05/04/21 1455  93 16 (!) 144/80 98 %   05/04/21 1440 97.7 °F (36.5 °C) 89 16 125/75 95 %   05/04/21 1425 98.1 °F (36.7 °C) 92 16 128/76 93 %   05/04/21 1410  90 12 127/63 91 %   05/04/21 1405  92 10 119/73 95 %   05/04/21 1400  94 12 132/72 92 %   05/04/21 1358  92 13 128/76 92 %   05/04/21 1354  97 16  93 %   05/04/21 1353 98 °F (36.7 °C) 97 16 129/76 92 %   05/04/21 0810 98.6 °F (37 °C) 86 20 (!) 144/78 94 %       Intake/Output Summary (Last 24 hours) at 5/4/2021 1927  Last data filed at 5/4/2021 1351  Gross per 24 hour   Intake 1150 ml   Output 425 ml   Net 725 ml        PHYSICAL EXAM:  General: WD, WN. Alert, cooperative, no acute distress    EENT:  EOMI. Anicteric sclerae. MMM  Resp:  CTA bilaterally, no wheezing or rales. No accessory muscle use  CV:  Regular  rhythm,  No edema  GI:  Soft, Non distended, Non tender. +Bowel sounds  Neurologic:  Alert and oriented X 3, normal speech, nonfocal  Psych:   Good insight. Not anxious nor agitated  Skin:  No rashes. No jaundice  Ext:  L ankle dressing clean    LABS:  I reviewed today's most current labs and imaging studies. Pertinent labs include:  Recent Labs     05/04/21  0556 05/02/21 1945   WBC 6.7 6.9   HGB 11.6* 13.6   HCT 34.6* 39.8    227     Recent Labs     05/04/21 0556 05/02/21 1945    142   K 3.9 3.9    103   CO2 26 30   * 133*   BUN 31* 28*   CREA 1.58* 1.75*   CA 8.3* 8.7   MG 1.9  --    ALB  --  3.8   TBILI  --  0.5   ALT  --  26       XR ANKLE LT AP/LAT   Final Result   Status post reduction and casting for fracture dislocation of the ankle as   above.           XR ANKLE LT MIN 3 V   Final Result   Acute left trimalleolar fracture subluxation as described. 2   intra-articular loose bodies          CXR 5/3  Pending  Portable AP semiupright view of the chest is obtained, comparison November 15. The inspiration is shallow. There is no acute infiltrate.   IMPRESSION:  No acute changes. EKG 5/3  Normal sinus rhythm 97 bpm  Increased R/S ratio in V1, consider early transition or posterior infarct   Abnormal ECG    Procedures: see electronic medical records for all procedures/Xrays and details which were not copied into this note but were reviewed prior to creation of Plan.         Assessment / Plan:  Principal Problem:    Closed trimalleolar fracture of ankle, left, initial encounter (5/2/2021)  Post Op doing well  Begin PT/OT in AM  Plan for Rehab     Active Problems:    Benign hypertensive heart disease without heart failure ()  Formulary ARB - Cozaar  Cont Norvasc, HCTZ  Clarify home meds - wife will bring in bottles in AM       Benign non-nodular prostatic hyperplasia without lower urinary tract symptoms ()  Maintain Flomax  Monitor for retention christopher-op       Graves disease (5/3/2021)  Followed by [facility] - Dr. Sugar Rand March 2021  Dx as Subclinical Hyperthyroidism,  Has been on treatment since 2016  Cont tx Tapazole 5mg daily  TFTs were wnel       Idiopathic chronic gout of multiple sites without tophus (3/22/2018)  No acute c/o  No current tx       Mixed hyperlipidemia ()  Cont home tx Lipitor hs           SAFETY:   Code Status:Full  DVT prophylaxis:contraindicated, pre-op  Stress Ulcer prophylaxis: Pepcid  Bladder catheter:no  Family Contact Info:  Primary Emergency Contact: Lino Hernandez, Anthony Phone: [phone]  Bedded: [facility] Room 134/01  Disposition: TBD, likely will need Rehab stay  Admission status:  Inpatient    Reviewed most current lab test results and cultures  YES  Reviewed most current radiology test results   YES  Review and summation of old records today    NO  Reviewed patient's current orders and MAR    YES  PMH/SH reviewed - no change compared to H&P    Care Plan discussed with:                                   Comments  Patient x     Family  x Wife - in room this evening   RN x     Care Manager       Consultant                           Multidiciplinary team rounds were held today with , nursing, pharmacist and clinical coordinator. Patient's plan of care was discussed; medications were reviewed and discharge planning was addressed.         ____________________________________________    Total NON Critical Care TIME:  35  Minutes        Comments   >50% of visit spent in counseling and coordination of care   x      Signed: Neida Hutchins MD  [facility] Hospitalist  [phone]

## 2021-05-04 NOTE — PERIOP NOTES
TRANSFER - IN REPORT:    Verbal report received from Kimber Delgado on Alexandre Plascencia  being received from Room 134 for ordered procedure      Report consisted of patients Situation, Background, Assessment and   Recommendations(SBAR). Information from the following report(s) SBAR, Intake/Output, MAR, Recent Results and Pre Procedure Checklist was reviewed with the receiving nurse. Opportunity for questions and clarification was provided. Assessment completed upon patients arrival to unit and care assumed.

## 2021-05-04 NOTE — PROGRESS NOTES
Bedside shift change report given to JESUS Dc RN (oncoming nurse) by Remberto Talbot RN   (offgoing nurse). Report included the following information SBAR, Kardex, Intake/Output, MAR and Recent Results. Bajwa score 4  Bed/chair alarm is in use. If in use it is set at the highest volume. Intravenous fluids were administered, none 0 ml/hr  Patient Vitals for the past 12 hrs:   Temp Pulse Resp BP SpO2   05/04/21 1940 (!) 101.2 °F (38.4 °C)       05/04/21 1910  97 16 (!) 154/82 98 %   05/04/21 1810 99.2 °F (37.3 °C) (!) 102 16 (!) 151/76 97 %   05/04/21 1710 98.9 °F (37.2 °C) 94 16 (!) 151/86 97 %   05/04/21 1640 97.8 °F (36.6 °C) 95 16 (!) 152/84 98 %   05/04/21 1610 97.8 °F (36.6 °C) 94 16 (!) 153/83 98 %   05/04/21 1540 97.5 °F (36.4 °C) 94 16 (!) 159/84 96 %   05/04/21 1510 (!) 96.3 °F (35.7 °C) 96 16 (!) 154/77 95 %   05/04/21 1455  93 16 (!) 144/80 98 %   05/04/21 1440 97.7 °F (36.5 °C) 89 16 125/75 95 %   05/04/21 1425 98.1 °F (36.7 °C) 92 16 128/76 93 %   05/04/21 1410  90 12 127/63 91 %   05/04/21 1405  92 10 119/73 95 %   05/04/21 1400  94 12 132/72 92 %   05/04/21 1358  92 13 128/76 92 %   05/04/21 1354  97 16  93 %   05/04/21 1353 98 °F (36.7 °C) 97 16 129/76 92 %   05/04/21 0810 98.6 °F (37 °C) 86 20 (!) 144/78 94 %     No flowsheet data found. Lab results reviewed. For significant abnormal values and values requiring intervention, see assessment and plan.

## 2021-05-04 NOTE — PROGRESS NOTES
Temp 101.2. Medicated with Tylenol as ordered. Pt. Encouraged to take deep breaths to improve lung volume and move fluids.

## 2021-05-04 NOTE — PROGRESS NOTES
Problem: Falls - Risk of  Goal: *Absence of Falls  Description: Document Maegan oMnsivais Fall Risk and appropriate interventions in the flowsheet.   Outcome: Progressing Towards Goal  Note: Fall Risk Interventions:            Medication Interventions: Bed/chair exit alarm    Elimination Interventions: Call light in reach    History of Falls Interventions: Bed/chair exit alarm         Problem: Patient Education: Go to Patient Education Activity  Goal: Patient/Family Education  Outcome: Progressing Towards Goal     Problem: Pain  Goal: *Control of Pain  Outcome: Progressing Towards Goal     Problem: Impaired Skin Integrity/Pressure Injury Treatment  Goal: *Improvement of Existing Pressure Injury  Outcome: Progressing Towards Goal

## 2021-05-05 LAB
AMORPH CRY URNS QL MICRO: ABNORMAL
APPEARANCE UR: CLEAR
ATRIAL RATE: 97 BPM
BACTERIA URNS QL MICRO: NEGATIVE /HPF
BILIRUB UR QL: NEGATIVE
CALCULATED P AXIS, ECG09: 63 DEGREES
CALCULATED R AXIS, ECG10: 1 DEGREES
CALCULATED T AXIS, ECG11: 46 DEGREES
COLOR UR: ABNORMAL
DIAGNOSIS, 93000: NORMAL
EPITH CASTS URNS QL MICRO: ABNORMAL /LPF
GLUCOSE UR STRIP.AUTO-MCNC: NEGATIVE MG/DL
HGB UR QL STRIP: NEGATIVE
KETONES UR QL STRIP.AUTO: NEGATIVE MG/DL
LEUKOCYTE ESTERASE UR QL STRIP.AUTO: NEGATIVE
NITRITE UR QL STRIP.AUTO: NEGATIVE
P-R INTERVAL, ECG05: 142 MS
PH UR STRIP: 6 [PH] (ref 5–8)
PROT UR STRIP-MCNC: NEGATIVE MG/DL
Q-T INTERVAL, ECG07: 338 MS
QRS DURATION, ECG06: 82 MS
QTC CALCULATION (BEZET), ECG08: 429 MS
RBC #/AREA URNS HPF: ABNORMAL /HPF (ref 0–5)
SP GR UR REFRACTOMETRY: 1.02 (ref 1–1.03)
UA: UC IF INDICATED,UAUC: ABNORMAL
UROBILINOGEN UR QL STRIP.AUTO: 0.2 EU/DL (ref 0.2–1)
VENTRICULAR RATE, ECG03: 97 BPM
WBC URNS QL MICRO: ABNORMAL /HPF (ref 0–4)

## 2021-05-05 PROCEDURE — 74011250636 HC RX REV CODE- 250/636: Performed by: ORTHOPAEDIC SURGERY

## 2021-05-05 PROCEDURE — 97161 PT EVAL LOW COMPLEX 20 MIN: CPT

## 2021-05-05 PROCEDURE — 74011250637 HC RX REV CODE- 250/637: Performed by: ORTHOPAEDIC SURGERY

## 2021-05-05 PROCEDURE — 81001 URINALYSIS AUTO W/SCOPE: CPT

## 2021-05-05 PROCEDURE — 74011250636 HC RX REV CODE- 250/636: Performed by: FAMILY MEDICINE

## 2021-05-05 PROCEDURE — 77030027138 HC INCENT SPIROMETER -A

## 2021-05-05 PROCEDURE — 65270000029 HC RM PRIVATE

## 2021-05-05 RX ORDER — MORPHINE SULFATE 4 MG/ML
4 INJECTION INTRAVENOUS ONCE
Status: COMPLETED | OUTPATIENT
Start: 2021-05-05 | End: 2021-05-05

## 2021-05-05 RX ORDER — OXYCODONE AND ACETAMINOPHEN 5; 325 MG/1; MG/1
1 TABLET ORAL
Status: DISCONTINUED | OUTPATIENT
Start: 2021-05-05 | End: 2021-05-06 | Stop reason: HOSPADM

## 2021-05-05 RX ADMIN — METHIMAZOLE 5 MG: 5 TABLET ORAL at 08:27

## 2021-05-05 RX ADMIN — Medication 10 ML: at 21:42

## 2021-05-05 RX ADMIN — ASPIRIN 81 MG CHEWABLE TABLET 81 MG: 81 TABLET CHEWABLE at 18:01

## 2021-05-05 RX ADMIN — ACETAMINOPHEN 650 MG: 325 TABLET ORAL at 04:29

## 2021-05-05 RX ADMIN — Medication 10 ML: at 01:37

## 2021-05-05 RX ADMIN — I-VITE, TAB 1000-60-2MG (60/BT) 1 TABLET: TAB at 18:01

## 2021-05-05 RX ADMIN — Medication 10 ML: at 16:20

## 2021-05-05 RX ADMIN — OXYCODONE AND ACETAMINOPHEN 1 TABLET: 5; 325 TABLET ORAL at 10:51

## 2021-05-05 RX ADMIN — POLYETHYLENE GLYCOL 3350 17 G: 17 POWDER, FOR SOLUTION ORAL at 10:51

## 2021-05-05 RX ADMIN — Medication 10 ML: at 04:32

## 2021-05-05 RX ADMIN — MORPHINE SULFATE 4 MG: 4 INJECTION INTRAVENOUS at 01:37

## 2021-05-05 RX ADMIN — ATORVASTATIN CALCIUM 10 MG: 10 TABLET, FILM COATED ORAL at 21:42

## 2021-05-05 RX ADMIN — CEFAZOLIN 2 G: 10 INJECTION, POWDER, FOR SOLUTION INTRAVENOUS at 04:00

## 2021-05-05 RX ADMIN — Medication 5000 UNITS: at 08:26

## 2021-05-05 RX ADMIN — I-VITE, TAB 1000-60-2MG (60/BT) 1 TABLET: TAB at 08:27

## 2021-05-05 RX ADMIN — MORPHINE SULFATE 4 MG: 4 INJECTION INTRAVENOUS at 00:37

## 2021-05-05 RX ADMIN — ASPIRIN 81 MG CHEWABLE TABLET 81 MG: 81 TABLET CHEWABLE at 08:47

## 2021-05-05 RX ADMIN — TAMSULOSIN HYDROCHLORIDE 0.4 MG: 0.4 CAPSULE ORAL at 08:27

## 2021-05-05 RX ADMIN — HYDROCHLOROTHIAZIDE 25 MG: 25 TABLET ORAL at 08:36

## 2021-05-05 NOTE — PROGRESS NOTES
S: Moderate pain at night. Had temp to 101. O: Splint dry and intact, temp 99. VSS, Some difficulty voiding this am.  A: Temp 2 to atelectasis. Pain POD 1  P: Will get a cast shoe for left foot. PT TTWB left foot.

## 2021-05-05 NOTE — PROGRESS NOTES
Spiritual Care Assessment/Progress Note  Kd Schneider      NAME: Beto Syed      MRN: [id number]  AGE: 81 y.o. SEX: male  Sabianism Affiliation: Mu-ism   Language: English     5/5/2021     Total Time (in minutes): 7     Spiritual Assessment begun in [address] through conversation with:         [x]Patient        [] Family    [] Friend(s)              Spiritual beliefs: (Please include comment if needed)     [x] Identifies with a olaf tradition:         [] Supported by a olaf community:            [] Claims no spiritual orientation:           [] Seeking spiritual identity:                [] Adheres to an individual form of spirituality:           [] Not able to assess:                           Identified resources for coping:      [] Prayer                               [] Music                  [] Guided Imagery     [x] Family/friends                 [] Pet visits     [] Devotional reading                         [] Unknown     [] Other:                                          Interventions offered during this visit: (See comments for more details)    Patient Interventions: Affirmation of emotions/emotional suffering, Affirmation of olaf, Initial/Spiritual assessment, patient floor, Initial visit, Prayer (assurance of)           Plan of Care:     [x] Support spiritual and/or cultural needs    [] Support AMD and/or advance care planning process      [] Support grieving process   [] Coordinate Rites and/or Rituals    [] Coordination with community clergy   [] No spiritual needs identified at this time   [] Detailed Plan of Care below (See Comments)  [] Make referral to Music Therapy  [] Make referral to Pet Therapy     [] Make referral to Addiction services  [] Make referral to [facility]  [] Make referral to Spiritual Care Partner  [] No future visits requested        [] Follow up upon further referrals     Comments: Initial spiritual assessment in Rm 134.   Patient/family shared about  His medical issues. Provided empathic listening and spiritual support. Advised of  Availability.   [address] [place] [address]

## 2021-05-05 NOTE — PROGRESS NOTES
Problem: Mobility Impaired (Adult and Pediatric)  Goal: *Acute Goals and Plan of Care (Insert Text)  Description: FUNCTIONAL STATUS PRIOR TO ADMISSION: Patient was independent and active without use of DME.    HOME SUPPORT PRIOR TO ADMISSION: The patient lived with wife but did not require assist.    Physical Therapy Goals  Initiated 5/5/2021  1. Patient will move from supine to sit and sit to supine  in bed with supervision/set-up within 7 day(s). 2.  Patient will transfer from bed to chair and chair to bed with supervision/set-up using the least restrictive device within 7 day(s). 3.  Patient will perform sit to stand with supervision/set-up within 7 day(s). 4.  Patient will ambulate with supervision/set-up for 25 feet with the least restrictive device within 7 day(s). 5.  Patient will ascend/descend 3 stairs with 2 handrail(s) with minimal assistance/contact guard assist within 7 day(s). Outcome: Progressing Towards Goal   PHYSICAL THERAPY EVALUATION  Patient: Tessa Magallanes (68 y.o. male)  Date: 5/5/2021  Primary Diagnosis: Closed trimalleolar fracture of ankle, left, initial encounter [S82.852A]  Procedure(s) (LRB):  LEFT ANKLE OPEN REDUCTION INTERNAL FIXATION (URGENT) (Left) 1 Day Post-Op   Precautions: TTWB LLE       ASSESSMENT  Based on the objective data described below, the patient presents with requiring min to mod A of 2 for bed mobility and transfers TTWB on LLE. Anticipate patient will progress well with his mobility, he was independent with mobility prior to ankle fracture. Current Level of Function Impacting Discharge (mobility/balance): min to mod A    Functional Outcome Measure: The patient scored 2+/5 on the [facility] standing scale outcome measure which is indicative of decreased balance. Other factors to consider for discharge: pt lives with wife     Patient will benefit from skilled therapy intervention to address the above noted impairments.        PLAN :  Recommendations and Planned Interventions: bed mobility training, transfer training, gait training, therapeutic exercises, and patient and family training/education      Frequency/Duration: Patient will be followed by physical therapy:  4-6 days/wk, 1-2x/day  to address goals. Recommendation for discharge: (in order for the patient to meet his/her long term goals)  Therapy up to 5 days/week in SNF setting or intensive home health therapy program    This discharge recommendation:  Has been made in collaboration with the attending provider and/or case management    IF patient discharges home will need the following DME: rolling walker         SUBJECTIVE:   Patient stated Reagan Pino had a lot of pain last night, it is better this morning.     OBJECTIVE DATA SUMMARY:   HISTORY:    Past Medical History:   Diagnosis Date    Acute gouty arthropathy     Benign hypertensive heart disease without heart failure     Hypertrophy of prostate without urinary obstruction and other lower urinary tract symptoms (LUTS)     Memory difficulties     Other and unspecified hyperlipidemia      Past Surgical History:   Procedure Laterality Date    HX CATARACT REMOVAL  01/01/2008    Rt    HX COLONOSCOPY  7/15    Leatha, q 10 yrs    HX HERNIA REPAIR  01/01/1980       Personal factors and/or comorbidities impacting plan of care: pt TTWB on LLE    Home Situation  Home Environment: Private residence  # Steps to Enter: 3  One/Two Story Residence: One story  Living Alone: No  Support Systems: Child(wale), Family member(s), Friends \ neighbors, Spouse/Significant Other/Partner  Patient Expects to be Discharged to[de-identified] Private residence  Current DME Used/Available at Home: None    EXAMINATION/PRESENTATION/DECISION MAKING:   Critical Behavior:  Neurologic State: Alert  Orientation Level: Oriented X4  Cognition: Follows commands     Hearing:   Auditory  Auditory Impairment: None         Functional Mobility:  Bed Mobility:     Supine to Sit: Minimum assistance; Moderate assistance  Sit to Supine: Minimum assistance; Moderate assistance  Scooting: Minimum assistance  Transfers:  Sit to Stand: Minimum assistance; Moderate assistance  Stand to Sit: Minimum assistance; Moderate assistance                       Balance:   Sitting: Intact  Standing: Intact; With support  Ambulation/Gait Training:  Distance (ft): 3 Feet (ft)  Assistive Device: Walker, rolling  Ambulation - Level of Assistance: Minimal assistance; Moderate assistance     Gait Description (WDL): Exceptions to WDL        Left Side Weight Bearing: Toe touch       Functional Measure:  [facility] standing balance 2+/5       Physical Therapy Evaluation Charge Determination   History Examination Presentation Decision-Making   MEDIUM  Complexity : 1-2 comorbidities / personal factors will impact the outcome/ POC  MEDIUM Complexity : 3 Standardized tests and measures addressing body structure, function, activity limitation and / or participation in recreation  LOW Complexity : Stable, uncomplicated  LOW Complexity : FOTO score of       Based on the above components, the patient evaluation is determined to be of the following complexity level: LOW     Pain Rating:  3-4 L ankle    Activity Tolerance:   Fair    After treatment patient left in no apparent distress:   Supine in bed, Heels elevated for pressure relief, and Call bell within reach    COMMUNICATION/EDUCATION:   The patients plan of care was discussed with: Physical therapist, Physical therapy assistant, Registered nurse, Case management, and Rehabilitation technician. Fall prevention education was provided and the patient/caregiver indicated understanding. and Patient/family have participated as able in goal setting and plan of care.     Thank you for this referral.  López Yost, PT   Time Calculation: 20 mins

## 2021-05-05 NOTE — PROGRESS NOTES
PT contact note:    PT referral received. Evaluation completed. Full note to follow. Patient performed bed mobility and sit to stand transfer with min to mod A of 2. TTWB on LLE. Patient was able to side step with RW 3 steps to head of bed. Will continue to follow patient.

## 2021-05-05 NOTE — OP NOTES
[facility]  OPERATIVE REPORT    Name:  Shaina Valencia  MR#:  [id number]  :  1935  ACCOUNT #:  [de-identified]  DATE OF SERVICE:  2021    PREOPERATIVE DIAGNOSIS:  Trimalleolar ankle fracture subluxation, left ankle. POSTOPERATIVE DIAGNOSIS:  Trimalleolar ankle fracture subluxation, left ankle. PROCEDURE PERFORMED:  Open reduction and internal fixation of trimalleolar ankle fracture with fixation of lateral, medial, and posterior malleolar fractures. SURGEON:  Padmini Castellanos MD    ASSISTANT:  none    ANESTHESIA:  Regional block with LMA. COMPLICATIONS:  None. SPECIMENS REMOVED:  none    IMPLANTS:  sythes plate and screws    ESTIMATED BLOOD LOSS:  Minimal under tourniquet control. TOURNIQUET TIME:  87 minutes. FINDINGS:  Long, oblique, comminuted lateral malleolus fracture with transverse medial malleolus fracture and posterior malleolus fracture representing approximately one-third of the joint surface. PROCEDURE:  The patient had been evaluated on the floor yesterday where he was noted to have a trimalleolar ankle fracture. His fracture appeared to involve a fairly long, oblique, Tinajero type B fracture involving the lateral malleolus. He was placed in a posterior splint and actually reduced on the floor yesterday. He is brought to the operating room today for open reduction and internal fixation of his ankle fracture. He was given 2 g of Ancef preoperatively. He was placed on operating table. Left leg placed in a tourniquet. He was given general LMA anesthesia. Left lower extremity was prepped and draped in the usual sterile fashion. Time-out was called verifying the patient's site of surgery and surgical procedure. Fluoro was brought into position and the position of fracture was evaluated. At this point, an incision was made over the lateral ankle. Dissection was carried sharply through the skin and subcutaneous tissue down to the fascia. Sharp dissection was carried out through the fascia, with blunt dissection through the subcutaneous tissue. The periosteum was elevated over the lateral malleolus. There was a very long, oblique fracture involving the lateral malleolus extending from about the level of the joint surface obliquely for about 4-5 cm. There was comminution at the top of this. Main fragments could be interdigitated reasonably well after clearing debris and elevating soft tissue. Once these were reapproximated using towel clip reduction clamps, two 3.5 cortical lag screws were placed in routine fashion giving good interfragmentary compression of the oblique fracture. At this point, a six-hole reconstruction plate was applied to the side and contoured slightly to adapt to the bone. At this point, it was attached with a single 3.5 cortical screw to position it. Once in appropriate position, it was then fixed with two 4.0 locking screws distally and two proximally, giving four cortices of fixation both proximal and distal to the comminuted fragment. This was done mainly in the posterior portion of the lateral malleolus which had been  off. This gave excellent stable fixation. Attention was now turned to the medial side where a curved oblique incision was made over the medial malleolus. Dissection was carried sharply through the skin and bluntly through the subcutaneous tissue. The saphenous vessels were identified and retracted. A knife was used to incise the periosteum to visualize the fracture site. Soft tissue was removed from the fracture site and it was reapproximated with a towel clip clamp. Guide pin was inserted. Once in appropriate position, 34 length by 4.0 cannulated screw was advanced across. This gave good interfragmentary compression at the fracture site. At this point, the posterior malleolus fracture was evaluated.   It was felt that this represented more than a third of the joint surface and therefore probably should be fixed. A single screw was passed over a guidewire which was directed in, using fluoroscopic guidance across the posterior malleolus. This appeared to give good interfragmentary compression of this fracture. At this point, the operative sites were irrigated. The incisions closed with 3-0 nylon over the medial incision, with 2-0 Vicryl in deep tissue laterally, and then 3-0 nylon interrupted running sutures. Sterile dressings were applied. He was placed in an U-type splint. Tourniquet released for a tourniquet time of 87 minutes. The patient returned to recovery room in satisfactory condition.       Chuy Kenyon MD      [id number]  D:  05/04/2021 14:09  T:  05/04/2021 23:06  JOB #:  [id number]  CC:  Anel Duque MD

## 2021-05-05 NOTE — PROGRESS NOTES
Pt medicated with PRN IV morphine at 0037. Pt was able to sleep for a little while then woke up c/o pain \"worse\" than before the morphine was given. Nsg south and Dr. Lola Cole made aware. New order received for one time dose of IV morphine 4 mg.

## 2021-05-05 NOTE — PROGRESS NOTES
Hospitalist Progress Note               Daily Progress Note: 5/5/2021      Subjective: The patient is seen for follow up. He was admitted 2 days ago with a left ankle fracture. Patient underwent ORIF of his trimalleolar ankle fracture yesterday    Complains of ongoing pain in his ankle    Had a temp of 101.2 last night    Chest x-ray from admission was normal.  WBC yesterday was normal.    Problem List:  Problem List as of 5/5/2021 Date Reviewed: 5/4/2021          Codes Class Noted - Resolved    Graves disease (Chronic) ICD-10-CM: E05.00  ICD-9-CM: 242.00  5/3/2021 - Present        Idiopathic chronic gout of multiple sites without tophus ICD-10-CM: M1A. 05Y8  ICD-9-CM: 274.02  3/22/2018 - Present        Memory difficulties ICD-10-CM: R41.3  ICD-9-CM: 780.93  Unknown - Present        Lipoma of joint ICD-10-CM: D17.9  ICD-9-CM: 214.9  9/17/2015 - Present        Mixed hyperlipidemia ICD-10-CM: E78.2  ICD-9-CM: 272.2  Unknown - Present        Benign hypertensive heart disease without heart failure ICD-10-CM: I11.9  ICD-9-CM: 402.10  Unknown - Present        Benign non-nodular prostatic hyperplasia without lower urinary tract symptoms ICD-10-CM: N40.0  ICD-9-CM: 600.90  Unknown - Present        * (Principal) RESOLVED: Closed trimalleolar fracture of ankle, left, initial encounter ICD-10-CM: M21.861D  ICD-9-CM: 824.6  5/2/2021 - 5/4/2021        RESOLVED: Acute gouty arthropathy ICD-10-CM: M10.9  ICD-9-CM: 274.01  Unknown - 3/22/2018              Medications reviewed  Current Facility-Administered Medications   Medication Dose Route Frequency    oxyCODONE-acetaminophen (PERCOCET) 5-325 mg per tablet 1 Tab  1 Tab Oral Q4H PRN    aspirin chewable tablet 81 mg  81 mg Oral BID    morphine injection 4 mg  4 mg IntraVENous Q4H PRN    amLODIPine (NORVASC) tablet 10 mg  10 mg Oral DAILY    atorvastatin (LIPITOR) tablet 10 mg  10 mg Oral QHS    cholecalciferol (VITAMIN D3) (1000 Units /25 mcg) tablet 5,000 Units 5,000 Units Oral DAILY    hydroCHLOROthiazide (HYDRODIURIL) tablet 25 mg  25 mg Oral DAILY    methIMAzole (TAPAZOLE) tablet 5 mg  5 mg Oral DAILY    tamsulosin (FLOMAX) capsule 0.4 mg  0.4 mg Oral DAILY    vitamin C-P-C-lutein-minerals (OCUVITE) tablet 1 Tab  1 Tab Oral BID    sodium chloride (NS) flush 5-40 mL  5-40 mL IntraVENous Q8H    sodium chloride (NS) flush 5-40 mL  5-40 mL IntraVENous PRN    acetaminophen (TYLENOL) tablet 650 mg  650 mg Oral Q6H PRN    Or    acetaminophen (TYLENOL) suppository 650 mg  650 mg Rectal Q6H PRN    polyethylene glycol (MIRALAX) packet 17 g  17 g Oral DAILY PRN    losartan (COZAAR) tablet 100 mg  100 mg Oral DAILY    ondansetron (ZOFRAN) injection 4 mg  4 mg IntraVENous Q4H PRN       Review of Systems:   A comprehensive review of systems was negative except for that written in the HPI. Objective:   Physical Exam:     Visit Vitals  BP (!) 108/50 Comment: Held Amlodapine and Losartan   Pulse 98   Temp 98.7 °F (37.1 °C)   Resp 20   Ht 5' 7\" (1.702 m)   Wt 77.1 kg (170 lb)   SpO2 94%   BMI 26.63 kg/m²    O2 Flow Rate (L/min): 2 l/min O2 Device: None (Room air)    Temp (24hrs), Av.7 °F (37.1 °C), Min:96.3 °F (35.7 °C), Max:101.2 °F (38.4 °C)    701 -  1900  In: -   Out: 50 [Urine:50]   1901 -  0700  In: 8704 [P.O.:240; I.V.:1150]  Out: 1025 [Urine:1025]    General:   Awake and alert   Lungs:   Clear to auscultation bilaterally. Chest wall:  No tenderness or deformity. Heart:  Regular rate and rhythm, S1, S2 normal, no murmur, click, rub or gallop. Abdomen:   Soft, non-tender. Bowel sounds normal. No masses,  No organomegaly. Extremities: Extremities normal, atraumatic, no cyanosis or edema. Pulses: 2+ and symmetric all extremities. Skin: Skin color, texture, turgor normal. No rashes or lesions   Neurologic: CNII-XII intact.   No gross focal deficits         Data Review:       Recent Days:  Recent Labs     21  0556 21  194 WBC 6.7 6.9   HGB 11.6* 13.6   HCT 34.6* 39.8    227     Recent Labs     05/04/21  0556 05/02/21  1945    142   K 3.9 3.9    103   CO2 26 30   * 133*   BUN 31* 28*   CREA 1.58* 1.75*   CA 8.3* 8.7   MG 1.9  --    ALB  --  3.8   TBILI  --  0.5   ALT  --  26     No results for input(s): PH, PCO2, PO2, HCO3, FIO2 in the last 72 hours. 24 Hour Results:  No results found for this or any previous visit (from the past 24 hour(s)). NC XR TECHNOLOGIST SERVICE   Final Result   FLUOROSCOPY WAS USED. Fluoro Dose:  0.703 mGy. vk      XR ANKLE LT AP/LAT   Final Result      XR CHEST PORT   Final Result   No acute changes. XR ANKLE LT AP/LAT   Final Result   Status post reduction and casting for fracture dislocation of the ankle as   above. XR ANKLE LT MIN 3 V   Final Result   Acute left trimalleolar fracture subluxation as described. 2   intra-articular loose bodies           Assessment:  Left trimalleolar ankle fracture, status post ORIF 5/4    Fever, etiology unclear    BPH    Hypertension    Acute kidney injury on admission, likely due to hypovolemia. Improved    Plan:  Continue postoperative care  Check urinalysis with reflex culture  Recheck labs in a.m. Care Plan discussed with: Patient/Family    Total time spent with patient: 30 minutes.     Natty Rasheed MD

## 2021-05-05 NOTE — PROGRESS NOTES
Problem: Falls - Risk of  Goal: *Absence of Falls  Description: Document Ameena Garenr Fall Risk and appropriate interventions in the flowsheet. Outcome: Progressing Towards Goal  Note: Fall Risk Interventions:  Mobility Interventions: Bed/chair exit alarm    Mentation Interventions: Bed/chair exit alarm    Medication Interventions: Bed/chair exit alarm    Elimination Interventions: Call light in reach    History of Falls Interventions: Bed/chair exit alarm         Problem: Patient Education: Go to Patient Education Activity  Goal: Patient/Family Education  Outcome: Progressing Towards Goal     Problem: Pain  Goal: *Control of Pain  Outcome: Progressing Towards Goal     Problem: Impaired Skin Integrity/Pressure Injury Treatment  Goal: *Improvement of Existing Pressure Injury  Outcome: Progressing Towards Goal     Problem: Pressure Injury - Risk of  Goal: *Prevention of pressure injury  Description: Document Shahram Scale and appropriate interventions in the flowsheet.   Outcome: Progressing Towards Goal  Note: Pressure Injury Interventions:  Sensory Interventions: Assess changes in LOC    Moisture Interventions: Absorbent underpads    Activity Interventions: Increase time out of bed    Mobility Interventions: HOB 30 degrees or less    Nutrition Interventions: Document food/fluid/supplement intake    Friction and Shear Interventions: HOB 30 degrees or less

## 2021-05-05 NOTE — PROGRESS NOTES
Bedside shift change report given to Billy Carbajal RN (oncoming nurse) by Gopi Dc RN (offgoing nurse). Report included the following information Kardex.

## 2021-05-06 VITALS
TEMPERATURE: 98.4 F | WEIGHT: 170 LBS | RESPIRATION RATE: 20 BRPM | OXYGEN SATURATION: 97 % | DIASTOLIC BLOOD PRESSURE: 77 MMHG | BODY MASS INDEX: 26.68 KG/M2 | HEART RATE: 109 BPM | HEIGHT: 67 IN | SYSTOLIC BLOOD PRESSURE: 156 MMHG

## 2021-05-06 LAB
ALBUMIN SERPL-MCNC: 2.4 G/DL (ref 3.5–5)
ANION GAP SERPL CALC-SCNC: 7 MMOL/L (ref 5–15)
BASOPHILS # BLD: 0 K/UL (ref 0–0.1)
BASOPHILS NFR BLD: 0 % (ref 0–1)
BUN SERPL-MCNC: 35 MG/DL (ref 6–20)
BUN/CREAT SERPL: 21 (ref 12–20)
CALCIUM SERPL-MCNC: 8 MG/DL (ref 8.5–10.1)
CHLORIDE SERPL-SCNC: 99 MMOL/L (ref 97–108)
CO2 SERPL-SCNC: 28 MMOL/L (ref 21–32)
COMMENT, HOLDF: NORMAL
CREAT SERPL-MCNC: 1.68 MG/DL (ref 0.7–1.3)
DIFFERENTIAL METHOD BLD: ABNORMAL
EOSINOPHIL # BLD: 0.2 K/UL (ref 0–0.4)
EOSINOPHIL NFR BLD: 3 % (ref 0–7)
ERYTHROCYTE [DISTWIDTH] IN BLOOD BY AUTOMATED COUNT: 12.3 % (ref 11.5–14.5)
GLUCOSE SERPL-MCNC: 117 MG/DL (ref 65–100)
HCT VFR BLD AUTO: 30.9 % (ref 36.6–50.3)
HGB BLD-MCNC: 10.3 G/DL (ref 12.1–17)
IMM GRANULOCYTES # BLD AUTO: 0.1 K/UL (ref 0–0.04)
IMM GRANULOCYTES NFR BLD AUTO: 1 % (ref 0–0.5)
LYMPHOCYTES # BLD: 0.8 K/UL (ref 0.8–3.5)
LYMPHOCYTES NFR BLD: 11 % (ref 12–49)
MCH RBC QN AUTO: 31.6 PG (ref 26–34)
MCHC RBC AUTO-ENTMCNC: 33.3 G/DL (ref 30–36.5)
MCV RBC AUTO: 94.8 FL (ref 80–99)
MONOCYTES # BLD: 0.8 K/UL (ref 0–1)
MONOCYTES NFR BLD: 11 % (ref 5–13)
NEUTS SEG # BLD: 5.5 K/UL (ref 1.8–8)
NEUTS SEG NFR BLD: 74 % (ref 32–75)
NRBC # BLD: 0 K/UL (ref 0–0.01)
NRBC BLD-RTO: 0 PER 100 WBC
PHOSPHATE SERPL-MCNC: 2.8 MG/DL (ref 2.6–4.7)
PLATELET # BLD AUTO: 173 K/UL (ref 150–400)
PMV BLD AUTO: 9.4 FL (ref 8.9–12.9)
POTASSIUM SERPL-SCNC: 4.6 MMOL/L (ref 3.5–5.1)
RBC # BLD AUTO: 3.26 M/UL (ref 4.1–5.7)
RBC MORPH BLD: ABNORMAL
SAMPLES BEING HELD,HOLD: NORMAL
SODIUM SERPL-SCNC: 134 MMOL/L (ref 136–145)
WBC # BLD AUTO: 7.4 K/UL (ref 4.1–11.1)

## 2021-05-06 PROCEDURE — 97116 GAIT TRAINING THERAPY: CPT

## 2021-05-06 PROCEDURE — 74011250637 HC RX REV CODE- 250/637: Performed by: ORTHOPAEDIC SURGERY

## 2021-05-06 PROCEDURE — 85025 COMPLETE CBC W/AUTO DIFF WBC: CPT

## 2021-05-06 PROCEDURE — 80069 RENAL FUNCTION PANEL: CPT

## 2021-05-06 PROCEDURE — 36415 COLL VENOUS BLD VENIPUNCTURE: CPT

## 2021-05-06 RX ORDER — OXYCODONE AND ACETAMINOPHEN 5; 325 MG/1; MG/1
1 TABLET ORAL
Qty: 16 TAB | Refills: 0 | OUTPATIENT
Start: 2021-05-06 | End: 2021-05-06 | Stop reason: SDUPTHER

## 2021-05-06 RX ORDER — OXYCODONE AND ACETAMINOPHEN 5; 325 MG/1; MG/1
1 TABLET ORAL
Qty: 15 TAB | Refills: 0 | OUTPATIENT
Start: 2021-05-06 | End: 2021-05-06

## 2021-05-06 RX ORDER — OXYCODONE AND ACETAMINOPHEN 5; 325 MG/1; MG/1
1 TABLET ORAL
Qty: 16 TAB | Refills: 0 | Status: SHIPPED | OUTPATIENT
Start: 2021-05-06 | End: 2021-05-11

## 2021-05-06 RX ADMIN — I-VITE, TAB 1000-60-2MG (60/BT) 1 TABLET: TAB at 08:16

## 2021-05-06 RX ADMIN — TAMSULOSIN HYDROCHLORIDE 0.4 MG: 0.4 CAPSULE ORAL at 08:16

## 2021-05-06 RX ADMIN — Medication 10 ML: at 06:47

## 2021-05-06 RX ADMIN — ASPIRIN 81 MG CHEWABLE TABLET 81 MG: 81 TABLET CHEWABLE at 08:15

## 2021-05-06 RX ADMIN — Medication 5000 UNITS: at 08:16

## 2021-05-06 RX ADMIN — HYDROCHLOROTHIAZIDE 25 MG: 25 TABLET ORAL at 08:16

## 2021-05-06 RX ADMIN — AMLODIPINE BESYLATE 10 MG: 10 TABLET ORAL at 08:15

## 2021-05-06 RX ADMIN — OXYCODONE AND ACETAMINOPHEN 1 TABLET: 5; 325 TABLET ORAL at 08:16

## 2021-05-06 RX ADMIN — LOSARTAN POTASSIUM 100 MG: 100 TABLET, FILM COATED ORAL at 08:15

## 2021-05-06 RX ADMIN — METHIMAZOLE 5 MG: 5 TABLET ORAL at 09:09

## 2021-05-06 NOTE — PROGRESS NOTES
Problem: Mobility Impaired (Adult and Pediatric)  Goal: *Acute Goals and Plan of Care (Insert Text)  Description: FUNCTIONAL STATUS PRIOR TO ADMISSION: Patient was independent and active without use of DME.    HOME SUPPORT PRIOR TO ADMISSION: The patient lived with wife but did not require assist.    Physical Therapy Goals  Initiated 5/5/2021  1. Patient will move from supine to sit and sit to supine  in bed with supervision/set-up within 7 day(s). 2.  Patient will transfer from bed to chair and chair to bed with supervision/set-up using the least restrictive device within 7 day(s). 3.  Patient will perform sit to stand with supervision/set-up within 7 day(s). 4.  Patient will ambulate with supervision/set-up for 25 feet with the least restrictive device within 7 day(s). 5.  Patient will ascend/descend 3 stairs with 2 handrail(s) with minimal assistance/contact guard assist within 7 day(s). Outcome: Progressing Towards Goal   PHYSICAL THERAPY TREATMENT  Patient: Corrie Caceres (81 y.o. male)  Date: 5/6/2021  Diagnosis: Closed trimalleolar fracture of ankle, left, initial encounter [S82.852A] Closed trimalleolar fracture of ankle, left, initial encounter  Procedure(s) (LRB):  LEFT ANKLE OPEN REDUCTION INTERNAL FIXATION (URGENT) (Left) 2 Days Post-Op  Precautions:    Chart, physical therapy assessment, plan of care and goals were reviewed. ASSESSMENT  Patient continues with skilled PT services and is progressing towards goals. YES. .     Current Level of Function Impacting Discharge (mobility/balance): PATIENT REQUIRES SUPERVISION TO CGA FOR BED MOB, TRANSFERS WITH RW AND GAIT WITH RW REQUIRE CGA TO MINIMAL ASSIST X 1, 10 FEET AT A TIME. PATIENT REQUIRES VC S FOR TTWB LLE, SEQUENCING STEPS, ERECTING POSTURE, KEEPING BODY CLOSE TO WKR AND FOR INCREASING  MARY. Other factors to consider for discharge: PATIENT REQUIRES 24/7 CARE. PATIENT IS AT RISK FOR FALLS.           PLAN :  Patient continues to benefit from skilled intervention to address the above impairments. Continue treatment per established plan of care. to address goals. Recommendation for discharge: (in order for the patient to meet his/her long term goals)  Therapy up to 5 days/week in SNF setting TO 6 DAYS/WK. This discharge recommendation:  Has been made in collaboration with the attending provider and/or case management    IF patient discharges home will need the following DME: rolling walker       SUBJECTIVE:   Patient stated HE IS GOING HOME.    OBJECTIVE DATA SUMMARY:   Critical Behavior:  Neurologic State: Alert  Orientation Level: Oriented X4  Cognition: Follows commands     Functional Mobility Training:  Bed Mobility:  Rolling: Supervision  Supine to Sit: Minimum assistance  Sit to Supine: Supervision  Scooting: Supervision        Transfers:  Sit to Stand: Contact guard assistance  Stand to Sit: Supervision                             Balance:  Sitting: Intact  Standing: Intact; With support  Standing - Static: Good  Standing - Dynamic : Fair  Ambulation/Gait Training:  Distance (ft): 10 Feet (ft)  Assistive Device: Walker, rolling  Ambulation - Level of Assistance: Supervision;Contact guard assistance;Assist x1              Left Side Weight Bearing: Toe touch                                Stairs: Therapeutic Exercises:   GAIT WITH RW.  Pain Rating:  3/10 LLE, ANKLE    Activity Tolerance:   Fair    After treatment patient left in no apparent distress:   Supine in bed    COMMUNICATION/COLLABORATION:   The patients plan of care was discussed with: Case management.      Adolph Hernández   Time Calculation: 20 mins

## 2021-05-06 NOTE — DISCHARGE SUMMARY
.    Hospitalist Discharge Summary     Patient ID:  Mikki Ledezma  [id number]  81 y.o.  1935  5/2/2021    PCP on record: Trent Stanley NP    Admit date: 5/2/2021  Discharge date and time: 5/6/2021    DISCHARGE DIAGNOSIS:    Trimalleolar Fracture, Left    CONSULTATIONS:  None    Excerpted HPI from H&P of Sandeep Izquierdo MD:  HPI: 81 y.o. male presenting for admission to [facility] for further evaluation and treatment for Closed trimalleolar fracture of ankle, left, initial encounter. He  has a past medical history of Acute gouty arthropathy, Benign hypertensive heart disease without heart failure, Hypertrophy of prostate without urinary obstruction and other lower urinary tract symptoms (LUTS), Memory difficulties, and Other and unspecified hyperlipidemia. Vickey Arora He presented to the ED vis EMS following injury jumping off his lawn tractor before it went down an embankment. His ankle was caught and was injured. Dr. Charlotte Potter has assessed and has scheduled for surgery on Tuesday.     No h/o CAD, denies h/o CP or TUTTLE. No acute illness with cough, fever, congestion, GI or  upset. Lives with his wife and mother-in-law. Has been tx by PCP in [place] area where he lived [place] years ago. Has been on tx for Hyperthyroidism with recent labs in therapeutic range.    ______________________________________________________________________  DISCHARGE SUMMARY/HOSPITAL COURSE:  for full details see H&P, daily progress notes, labs, consult notes. Patient was admitted and seen by orthopedist who performed ORIF for the left trimalleolar fracture. Patient worked with PT and OT who recommended SNF versus home health. Patient and wife discussed options and have elected for home health. Home health has been arranged by . _______________________________________________________________________  Patient seen and examined by me on discharge day.   Pertinent Findings:  General:   Awake and alert   Lungs:   Clear to auscultation bilaterally. Chest wall:  No tenderness or deformity. Heart:  Regular rate and rhythm, S1, S2 normal, no murmur, click, rub or gallop. Abdomen:   Soft, non-tender. Bowel sounds normal. No masses,  No organomegaly. Extremities: Extremities normal, atraumatic, no cyanosis or edema. Left foot cast.    Pulses: 2+ and symmetric all extremities. Skin: Skin color, texture, turgor normal. No rashes or lesions   Neurologic: CNII-XII intact. No gross focal deficits       _______________________________________________________________________  DISCHARGE MEDICATIONS:   Current Discharge Medication List      START taking these medications    Details   oxyCODONE-acetaminophen (PERCOCET) 5-325 mg per tablet Take 1 Tab by mouth every four (4) hours as needed for Pain for up to 5 days. Max Daily Amount: 6 Tabs. Qty: 15 Tab, Refills: 0    Associated Diagnoses: Closed trimalleolar fracture of left ankle, initial encounter         CONTINUE these medications which have NOT CHANGED    Details   !! hydroCHLOROthiazide (HYDRODIURIL) 12.5 mg tablet Take 12.5 mg by mouth daily. methIMAzole (TAPAZOLE) 5 mg tablet Take 2 tabs daily for 4 weeks, then reduce to 1 tab daily therafter  Qty: 180 Tab, Refills: 3      peg 400-propylene glycol (SYSTANE, PROPYLENE GLYCOL,) 0.4-0.3 % drop Apply 1 Drop to eye. Several times per day      VIT A/VIT C/VIT E/ZINC/COPPER (PRESERVISION AREDS PO) Take  by mouth two (2) times a day. irbesartan (AVAPRO) 300 mg tablet TAKE 1 TABLET BY MOUTH EVERY DAY FOR BLOOD PRESSURE  Refills: 3      cholecalciferol, VITAMIN D3, (VITAMIN D3) 5,000 unit tab tablet Take  by mouth two (2) times a day. amLODIPine (NORVASC) 10 mg tablet Take  by mouth daily. atorvastatin (LIPITOR) 10 mg tablet Take  by mouth daily. tamsulosin (FLOMAX) 0.4 mg capsule Take 0.4 mg by mouth daily.       cetirizine (ZYRTEC) 10 mg tablet TAKE 1 TABLET BY MOUTH ONCE DAILY FOR 7 DAYS AND THEN AS NEEDED  Qty: 30 Tab, Refills: 5    Associated Diagnoses: Allergic rhinitis, unspecified seasonality, unspecified trigger; Chronic cough      tadalafil (CIALIS) 10 mg tablet TAKE 1 TABLET BY MOUTH ONCE DAILY  Refills: 1      !! hydroCHLOROthiazide (HYDRODIURIL) 25 mg tablet TAKE 1 TABLET BY MOUTH EVERY DAY  Refills: 0      aspirin delayed-release 81 mg tablet Take  by mouth every fourty-eight (48) hours. B.infantis-B.ani-B.long-B.bifi 10-15 mg TbEC Take  by mouth daily. 1 tab every other day       !! - Potential duplicate medications found. Please discuss with provider. Patient Follow Up Instructions: Activity: as directed by PT/OT  Diet: Cardiac Diet  Wound Care: None needed    Follow-up with PCP in 1 week and Orthopedist (Dr. Lianna Merrill) in 10 days.    Follow-up tests/labs none  Follow-up Information     Follow up With Specialties Details Why Contact Info Erik Schwab, NP Nurse Practitioner   Bishnu Cisse  [address] [facility] [address] [phone]          ________________________________________________________________    Risk of deterioration: Moderate    Condition at Discharge:  Stable  __________________________________________________________________    Disposition  Home with family and home health services    ____________________________________________________________________    Code Status: Full Code  ___________________________________________________________________      Total time in minutes spent coordinating this discharge (includes going over instructions, follow-up, prescriptions, and preparing report for sign off to her PCP) :  50 minutes    Signed:  Charly Braxton DO

## 2021-05-06 NOTE — PROGRESS NOTES
Discharge instructions reviewed with patient and his spouse  Personal belongings returned: clothing and cell phone  Home meds returned: n/a  To front entrance via wheelchair  Discharged home with  spouse @ [phone]. Pt.'s prescription for Percocet discussed with Dr. Neftali Rivera. It will hopefully be escribed by the end of the work day.

## 2021-05-06 NOTE — PROGRESS NOTES
Care Management Interventions  PCP Verified by CM: Srinath Lopes NP )  Last Visit to PCP: 08/17/20  Palliative Care Criteria Met (RRAT>21 & CHF Dx)?: No(No MD order)  Mode of Transport at Discharge: Other (see comment)  Transition of Care Consult (CM Consult): Discharge Planning  Occupational Therapy Consult: No  Speech Therapy Consult: No  Current Support Network: Lives with Spouse  Confirm Follow Up Transport: Family  The Plan for Transition of Care is Related to the Following Treatment Goals : Tx L ankle fx   Discharge Location  Discharge Placement: Home with home health    Patient is being discharged today. He was presented the option of staying at a skilled care facility or going to [facility] swing bed. He decided however to go home with home health. He will receive nursing, PT and OT. List of providers was given to patient and he choose [facility] home health. Carlotta of Choice form signed and placed in chart. [address] of [address] form given to patient. [facility] states nursing can see him Saturday and PT can see him Monday. Patient will need rolling walker after discharge. Patient is to stop by the [facility] to  the rolling walker--he understands this. Patient aware and in agreement with discharge plan. Instructed patient to call care management if he has any questions or concerns. RUR: 16% LOW    Transition of Care (NAMRATA) Plan:  Home w/HH     NAMRATA Transportation:       How is patient being transported at discharge? Wife POV      When? Today      Is transport scheduled? N/a     Follow-up appointment and transportation:     PCP? Damion Becerra NP 5/11 at 3:20pm      Who is transporting to the follow-up appointment? POV       Is transport for follow up appointment scheduled? N/a     Communication plan (with patient/family): Patient aware and in agreement with discharge plan       Who is being called? Patient or Next of Kin? Responsible party? Patient       What number(s) is to be used?    [phone]     Or [phone] (Cell)     What service provider is calling for [facility] services? [address]       When are they calling? 24--48 hours prior to appointment.        Click here to complete [address] including selection of the Healthcare Decision Maker Relationship (ie \"Primary\")  @[email or web address]

## 2021-05-06 NOTE — PROGRESS NOTES
Problem: Falls - Risk of  Goal: *Absence of Falls  Description: Document Meaghan Uribe Fall Risk and appropriate interventions in the flowsheet.   Outcome: Progressing Towards Goal  Note: Fall Risk Interventions:  Mobility Interventions: Patient to call before getting OOB, Utilize walker, cane, or other assistive device, Bed/chair exit alarm    Mentation Interventions: Door open when patient unattended, Bed/chair exit alarm, Adequate sleep, hydration, pain control    Medication Interventions: Bed/chair exit alarm, Patient to call before getting OOB, Teach patient to arise slowly    Elimination Interventions: Call light in reach, Urinal in reach, Patient to call for help with toileting needs, Stay With Me (per policy), Bed/chair exit alarm    History of Falls Interventions: Bed/chair exit alarm, Door open when patient unattended

## 2021-05-06 NOTE — PROGRESS NOTES
Bedside shift change report given to CHIKA Fontaine (oncoming nurse) by Jessa Tuttle LPN (offgoing nurse). Report included the following information SBAR, Kardex and MAR.

## 2021-05-12 ENCOUNTER — TELEPHONE (OUTPATIENT)
Dept: FAMILY MEDICINE CLINIC | Age: 86
End: 2021-05-12

## 2021-05-12 NOTE — TELEPHONE ENCOUNTER
----- Message from Shruthi Ralph sent at 5/12/2021  1:59 PM EDT -----  Regarding: NEVAEH Olson/Telephone  General Message/Vendor Calls    Caller's first and last name: Chu Garcia with [facility] home [facility]       Reason for call: needs a verbal order for OT       Callback required yes/no and why: yes, please      Best contact number(s): [phone]      Details to clarify the request:      Shruthi Ralph

## 2021-07-09 ENCOUNTER — TELEPHONE (OUTPATIENT)
Dept: ENDOCRINOLOGY | Age: 86
End: 2021-07-09

## 2021-07-09 RX ORDER — METHIMAZOLE 5 MG/1
5 TABLET ORAL DAILY
Qty: 90 TABLET | Refills: 1 | Status: SHIPPED | OUTPATIENT
Start: 2021-07-09 | End: 2022-02-25 | Stop reason: SDUPTHER

## 2021-07-09 NOTE — TELEPHONE ENCOUNTER
7/9/2021  3:00 PM    Requesting call concerning refill of Methimazole 5 mg tablets    [phone]    Thanks,  Harvey Dave

## 2021-07-09 NOTE — TELEPHONE ENCOUNTER
I returned this call and Mr. Ofelia Doll said he needed a refill on his Methimazole. I told him I would do this and send it to Dr. Katherine Stein to sign. I also reminded him that Dr. sOcar Buckner had wanted him to get his TSH checked again by his PCP. He said he would give them a call to do this.   Chantal Weinstein

## 2021-07-09 NOTE — TELEPHONE ENCOUNTER
7/9/2021  3:00 PM     Requesting call concerning refill of Methimazole 5 mg tablets     [phone]     Thanks,  Yaneli Barnes

## 2021-08-30 ENCOUNTER — OFFICE VISIT (OUTPATIENT)
Dept: FAMILY MEDICINE CLINIC | Age: 86
End: 2021-08-30
Payer: MEDICARE

## 2021-08-30 ENCOUNTER — HOSPITAL ENCOUNTER (OUTPATIENT)
Dept: GENERAL RADIOLOGY | Age: 86
Discharge: HOME | End: 2021-08-30
Payer: MEDICARE

## 2021-08-30 VITALS
OXYGEN SATURATION: 98 % | HEIGHT: 67 IN | WEIGHT: 174 LBS | SYSTOLIC BLOOD PRESSURE: 120 MMHG | DIASTOLIC BLOOD PRESSURE: 70 MMHG | HEART RATE: 78 BPM | RESPIRATION RATE: 20 BRPM | TEMPERATURE: 98.6 F | BODY MASS INDEX: 27.31 KG/M2

## 2021-08-30 DIAGNOSIS — M25.522 ELBOW PAIN, LEFT: ICD-10-CM

## 2021-08-30 DIAGNOSIS — M25.522 ELBOW PAIN, LEFT: Primary | ICD-10-CM

## 2021-08-30 DIAGNOSIS — Z48.02 VISIT FOR SUTURE REMOVAL: ICD-10-CM

## 2021-08-30 PROCEDURE — 73080 X-RAY EXAM OF ELBOW: CPT

## 2021-08-30 PROCEDURE — 99213 OFFICE O/P EST LOW 20 MIN: CPT | Performed by: NURSE PRACTITIONER

## 2021-08-30 NOTE — PROGRESS NOTES
1. Have you been to the ER, urgent care clinic since your last visit? Yes  Hospitalized since your last visit? Yes / [facility] / 05/2021    2. Have you seen or consulted any other health care providers outside of the [address] [place] [address] since your last visit? Include any pap smears or colon screening.  No

## 2021-08-31 ENCOUNTER — TELEPHONE (OUTPATIENT)
Dept: FAMILY MEDICINE CLINIC | Age: 86
End: 2021-08-31

## 2021-08-31 NOTE — TELEPHONE ENCOUNTER
The note provided interventions to try such as application of heat/ice and elevation and rest.  I can also offer Physical therapy  and or a referral to orthopedic Dr. Daquan Zaragoza in [place].

## 2021-08-31 NOTE — TELEPHONE ENCOUNTER
----- Message from Raul Mon sent at 8/31/2021 11:55 AM EDT -----  Regarding: NEVAEH Boss/Telephone  General Message/Vendor Calls    Caller's first and last name: pt      Reason for call: update and Question      Callback required yes/no and why: yes, please      Best contact number(s): [phone]      Details to clarify the request: Pt stated he went had the x-ray done of his elbow yesterday and the x-ray came back negative. The patient said, he is in a lot of pain and wants to know what he is suppose to do now. It gets worse each day.       Raul Mon

## 2021-08-31 NOTE — PROGRESS NOTES
Patient verified stating name and date of birth. Patient informed of  x rayresults and states understanding.

## 2021-08-31 NOTE — PROGRESS NOTES
X ray elbow unremarkable. No fluid /effusion. Rest , ice , compression and  elevation . May Add a support sleeve  for comfort . Application of moist heat  after 24 hours to increase circulation   Be careful not to damage the skin use a protective layer  such as a thin cloth. Resume activities after 72 hours gradually.  Return to office if symptoms persist or worsen

## 2021-09-01 NOTE — PROGRESS NOTES
Subjective:   Kathia Viera is a 81 y.o. is here for suture removal.  He had ankle surgery in May. One suture started poking out of the skin. His wife attempted to pull it out. Objective:   Patient appears well. Wound has healed, without evidence of infection. . Thin scab with one suture- identified left ankle.     Assessment/Plan:   Wound healing well, ready for suture removal. One suture  recheck PRN, sutures removed, discussed scaring    Follow up prn    Priscila ENGELC

## 2021-09-01 NOTE — PROGRESS NOTES
Subjective:     Tonie Bowser is a 81 y.o. 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 Blood pressure normotensive. Elbow pain;  Elbow Pain  Patient complains of left elbow pain. Onset of the symptoms was 3 days ago. He is unclear of. Inciting event: none known. Current symptoms include aching discomfort. Aggravating symptoms: lifting heavy objects. . Patient's overall course: interminttent, course of pain: intermittent, course of stiffness: intermittent, course of effusion: n/a, course of grinding: n/a and course of instability: n/a. Patient has had no prior elbow problems. Previous visits for this problem: none. Evaluation to date: none. Treatment to date: OTC analgesics.  ice.       depression screening addressed not at risk    abuse screening addressed denies    learning assessment addressed reviewed nurses notes    fall risk addressed not at risk    HM: addressed    ROS:  Gen: denies fever, chills, fatigue, weight loss, weight gain  HEENT:denies blurry vision, nasal congestion, sore throat  Resp: denies dypsnea, cough, wheezing  CV: denies chest pain radiating to the jaws or arms, palpitations, lower extremity edema  Abd: denies nausea, vomiting, diarrhea, constipation  Neuro: denies numbness/tingling  Endo: denies polyuria, polydipsia, heat/cold intolerance  Heme: no lymphadenopathy    No Known Allergies      Current Outpatient Medications:     methIMAzole (TAPAZOLE) 5 mg tablet, Take 1 Tablet by mouth daily. PLEASE SEND FUTURE REFILL REQUESTS TO PCP OR NEW ENDOCRINOLOGIST AS DR MONROY IS NO LONGER AT THIS PRACTICE, Disp: 90 Tablet, Rfl: 1    hydroCHLOROthiazide (HYDRODIURIL) 12.5 mg tablet, Take 12.5 mg by mouth daily. , Disp: , Rfl:     cetirizine (ZYRTEC) 10 mg tablet, TAKE 1 TABLET BY MOUTH ONCE DAILY FOR 7 DAYS AND THEN AS NEEDED, Disp: 30 Tab, Rfl: 5    tadalafil (CIALIS) 10 mg tablet, TAKE 1 TABLET BY MOUTH ONCE DAILY, Disp: , Rfl: 1    hydroCHLOROthiazide (HYDRODIURIL) 25 mg tablet, TAKE 1 TABLET BY MOUTH EVERY DAY, Disp: , Rfl: 0    peg 400-propylene glycol (SYSTANE, PROPYLENE GLYCOL,) 0.4-0.3 % drop, Apply 1 Drop to eye. Several times per day, Disp: , Rfl:     VIT A/VIT C/VIT E/ZINC/COPPER (PRESERVISION AREDS PO), Take  by mouth two (2) times a day., Disp: , Rfl:     irbesartan (AVAPRO) 300 mg tablet, TAKE 1 TABLET BY MOUTH EVERY DAY FOR BLOOD PRESSURE, Disp: , Rfl: 3    cholecalciferol, VITAMIN D3, (VITAMIN D3) 5,000 unit tab tablet, Take  by mouth two (2) times a day., Disp: , Rfl:     aspirin delayed-release 81 mg tablet, Take  by mouth every fourty-eight (48) hours. , Disp: , Rfl:     B.infantis-B.ani-B.long-B.bifi 10-15 mg TbEC, Take  by mouth daily. 1 tab every other day, Disp: , Rfl:     amLODIPine (NORVASC) 10 mg tablet, Take  by mouth daily. , Disp: , Rfl:     atorvastatin (LIPITOR) 10 mg tablet, Take  by mouth daily. , Disp: , Rfl:     tamsulosin (FLOMAX) 0.4 mg capsule, Take 0.4 mg by mouth daily. , Disp: , Rfl:     Past Medical History:   Diagnosis Date    Acute gouty arthropathy     Benign hypertensive heart disease without heart failure     Hypertrophy of prostate without urinary obstruction and other lower urinary tract symptoms (LUTS)     Memory difficulties     Other and unspecified hyperlipidemia        Past Surgical History:   Procedure Laterality Date    HX CATARACT REMOVAL  01/01/2008    Rt    HX COLONOSCOPY  7/15    [place], q 10 yrs    HX HERNIA REPAIR  01/01/1980       Social History     Tobacco Use   Smoking Status Never Smoker   Smokeless Tobacco Never Used       Social History     Socioeconomic History    Marital status:      Spouse name: Not on file    Number of children: Not on file    Years of education: Not on file    Highest education level: Not on file   Tobacco Use    Smoking status: Never Smoker    Smokeless tobacco: Never Used   Substance and Sexual Activity    Alcohol use: Yes     Alcohol/week: 1.7 standard drinks     Types: 2 Standard drinks or equivalent per week     Comment: social     Social Determinants of Health     Financial Resource Strain:     Difficulty of Paying Living Expenses:    Food Insecurity:     Worried About Running Out of Food in the Last Year:     [address] in the Last Year:    Transportation Needs:     Lack of Transportation (Medical):  Lack of Transportation (Non-Medical):    Physical Activity:     Days of Exercise per Week:     Minutes of Exercise per Session:    Stress:     Feeling of Stress :    Social Connections:     Frequency of Communication with Friends and Family:     Frequency of Social Gatherings with Friends and Family:     Attends Bahai Services:     Active Member of Clubs or Organizations:     Attends Club or Organization Meetings:     Marital Status:        Family History   Problem Relation Age of Onset    Hypertension Mother     Stroke Mother     Hypertension Father     Stroke Father     Breast Cancer Sister          Objective:     Visit Vitals  /70 (BP 1 Location: Right arm, BP Patient Position: Sitting, BP Cuff Size: Adult)   Pulse 78   Temp 98.6 °F (37 °C) (Core)   Resp 20   Ht 5' 7\" (1.702 m)   Wt 174 lb (78.9 kg)   SpO2 98%   BMI 27.25 kg/m²     Body mass index is 27.25 kg/m². General: Alert and oriented. No acute distress.   Well nourished  HEENT :  Ears:TMs are normal. Canals are clear. Eyes: pupils equal, round, react to light and accommodation. Nose: patent. Mouth and throat is clear. Neck:supple full range of motion no thyromegaly. Trachea midline, No carotid bruits. No significant lymphadenopathy  Lungs[de-identified] clear to auscultation without wheezes, rales, or rhonchi. Heart :RRR, S1 & S2 are normal intensity. No murmur; no gallop  Extremities: without clubbing, cyanosis, or edema. Left upper extremity exam unremarkable   Pulses: radial and femoral pulses are normal  Neuro: HMF intact. Cranial nerves II through XII grossly normal.  Motor: is 5 over 5 and symmetrical.   Deep tendon reflexes: +2 equal  Psych:appropriate behavior, mood, affect and judgement. Results for orders placed or performed during the hospital encounter of 05/02/21   CBC WITH AUTOMATED DIFF   Result Value Ref Range    WBC 6.9 4.1 - 11.1 K/uL    RBC 4.20 4. 10 - 5.70 M/uL    HGB 13.6 12.1 - 17.0 g/dL    HCT 39.8 36.6 - 50.3 %    MCV 94.8 80.0 - 99.0 FL    MCH 32.4 26.0 - 34.0 PG    MCHC 34.2 30.0 - 36.5 g/dL    RDW 12.8 11.5 - 14.5 %    PLATELET 709 108 - 189 K/uL    MPV 9.0 8.9 - 12.9 FL    NRBC 0.0 0  WBC    ABSOLUTE NRBC 0.00 0.00 - 0.01 K/uL    NEUTROPHILS 80 (H) 32 - 75 %    LYMPHOCYTES 11 (L) 12 - 49 %    MONOCYTES 7 5 - 13 %    EOSINOPHILS 2 0 - 7 %    BASOPHILS 0 0 - 1 %    IMMATURE GRANULOCYTES 0 0.0 - 0.5 %    ABS. NEUTROPHILS 5.5 1.8 - 8.0 K/UL    ABS. LYMPHOCYTES 0.8 0.8 - 3.5 K/UL    ABS. MONOCYTES 0.5 0.0 - 1.0 K/UL    ABS. EOSINOPHILS 0.1 0.0 - 0.4 K/UL    ABS. BASOPHILS 0.0 0.0 - 0.1 K/UL    ABS. IMM.  GRANS. 0.0 0.00 - 0.04 K/UL    DF SMEAR SCANNED      RBC COMMENTS NORMOCYTIC, NORMOCHROMIC     METABOLIC PANEL, COMPREHENSIVE   Result Value Ref Range    Sodium 142 136 - 145 mmol/L    Potassium 3.9 3.5 - 5.1 mmol/L    Chloride 103 97 - 108 mmol/L    CO2 30 21 - 32 mmol/L    Anion gap 9 5 - 15 mmol/L    Glucose 133 (H) 65 - 100 mg/dL    BUN 28 (H) 6 - 20 MG/DL Creatinine 1.75 (H) 0.70 - 1.30 MG/DL    BUN/Creatinine ratio 16 12 - 20      GFR est AA 45 (L) >60 ml/min/1.73m2    GFR est non-AA 37 (L) >60 ml/min/1.73m2    Calcium 8.7 8.5 - 10.1 MG/DL    Bilirubin, total 0.5 0.2 - 1.0 MG/DL    ALT (SGPT) 26 12 - 78 U/L    AST (SGOT) 27 15 - 37 U/L    Alk. phosphatase 123 (H) 45 - 117 U/L    Protein, total 7.1 6.4 - 8.2 g/dL    Albumin 3.8 3.5 - 5.0 g/dL    Globulin 3.3 2.0 - 4.0 g/dL    A-G Ratio 1.2 1.1 - 2.2     METABOLIC PANEL, BASIC   Result Value Ref Range    Sodium 137 136 - 145 mmol/L    Potassium 3.9 3.5 - 5.1 mmol/L    Chloride 102 97 - 108 mmol/L    CO2 26 21 - 32 mmol/L    Anion gap 9 5 - 15 mmol/L    Glucose 116 (H) 65 - 100 mg/dL    BUN 31 (H) 6 - 20 MG/DL    Creatinine 1.58 (H) 0.70 - 1.30 MG/DL    BUN/Creatinine ratio 20 12 - 20      GFR est AA 51 (L) >60 ml/min/1.73m2    GFR est non-AA 42 (L) >60 ml/min/1.73m2    Calcium 8.3 (L) 8.5 - 10.1 MG/DL   MAGNESIUM   Result Value Ref Range    Magnesium 1.9 1.6 - 2.4 mg/dL   CBC WITH AUTOMATED DIFF   Result Value Ref Range    WBC 6.7 4.1 - 11.1 K/uL    RBC 3.64 (L) 4.10 - 5.70 M/uL    HGB 11.6 (L) 12.1 - 17.0 g/dL    HCT 34.6 (L) 36.6 - 50.3 %    MCV 95.1 80.0 - 99.0 FL    MCH 31.9 26.0 - 34.0 PG    MCHC 33.5 30.0 - 36.5 g/dL    RDW 12.6 11.5 - 14.5 %    PLATELET 524 625 - 692 K/uL    MPV 9.3 8.9 - 12.9 FL    NRBC 0.0 0  WBC    ABSOLUTE NRBC 0.00 0.00 - 0.01 K/uL    NEUTROPHILS 71 32 - 75 %    LYMPHOCYTES 13 12 - 49 %    MONOCYTES 14 (H) 5 - 13 %    EOSINOPHILS 2 0 - 7 %    BASOPHILS 0 0 - 1 %    IMMATURE GRANULOCYTES 0 0.0 - 0.5 %    ABS. NEUTROPHILS 4.8 1.8 - 8.0 K/UL    ABS. LYMPHOCYTES 0.9 0.8 - 3.5 K/UL    ABS. MONOCYTES 0.9 0.0 - 1.0 K/UL    ABS. EOSINOPHILS 0.1 0.0 - 0.4 K/UL    ABS. BASOPHILS 0.0 0.0 - 0.1 K/UL    ABS. IMM.  GRANS. 0.0 0.00 - 0.04 K/UL    DF AUTOMATED     URINALYSIS W/ REFLEX CULTURE    Specimen: Urine   Result Value Ref Range    Color YELLOW/STRAW      Appearance CLEAR CLEAR      Specific gravity 1.025 1.003 - 1.030      pH (UA) 6.0 5.0 - 8.0      Protein Negative NEG mg/dL    Glucose Negative NEG mg/dL    Ketone Negative NEG mg/dL    Bilirubin Negative NEG      Blood Negative NEG      Urobilinogen 0.2 0.2 - 1.0 EU/dL    Nitrites Negative NEG      Leukocyte Esterase Negative NEG      WBC 0-4 0 - 4 /hpf    RBC 0-5 0 - 5 /hpf    Epithelial cells FEW FEW /lpf    Bacteria Negative NEG /hpf    UA:UC IF INDICATED CULTURE NOT INDICATED BY UA RESULT CNI      Amorphous Crystals FEW (A) NEG     CBC WITH AUTOMATED DIFF   Result Value Ref Range    WBC 7.4 4.1 - 11.1 K/uL    RBC 3.26 (L) 4.10 - 5.70 M/uL    HGB 10.3 (L) 12.1 - 17.0 g/dL    HCT 30.9 (L) 36.6 - 50.3 %    MCV 94.8 80.0 - 99.0 FL    MCH 31.6 26.0 - 34.0 PG    MCHC 33.3 30.0 - 36.5 g/dL    RDW 12.3 11.5 - 14.5 %    PLATELET 888 305 - 059 K/uL    MPV 9.4 8.9 - 12.9 FL    NRBC 0.0 0  WBC    ABSOLUTE NRBC 0.00 0.00 - 0.01 K/uL    NEUTROPHILS 74 32 - 75 %    LYMPHOCYTES 11 (L) 12 - 49 %    MONOCYTES 11 5 - 13 %    EOSINOPHILS 3 0 - 7 %    BASOPHILS 0 0 - 1 %    IMMATURE GRANULOCYTES 1 (H) 0.0 - 0.5 %    ABS. NEUTROPHILS 5.5 1.8 - 8.0 K/UL    ABS. LYMPHOCYTES 0.8 0.8 - 3.5 K/UL    ABS. MONOCYTES 0.8 0.0 - 1.0 K/UL    ABS. EOSINOPHILS 0.2 0.0 - 0.4 K/UL    ABS. BASOPHILS 0.0 0.0 - 0.1 K/UL    ABS. IMM.  GRANS. 0.1 (H) 0.00 - 0.04 K/UL    DF SMEAR SCANNED      RBC COMMENTS NORMOCYTIC, NORMOCHROMIC     RENAL FUNCTION PANEL   Result Value Ref Range    Sodium 134 (L) 136 - 145 mmol/L    Potassium 4.6 3.5 - 5.1 mmol/L    Chloride 99 97 - 108 mmol/L    CO2 28 21 - 32 mmol/L    Anion gap 7 5 - 15 mmol/L    Glucose 117 (H) 65 - 100 mg/dL    BUN 35 (H) 6 - 20 MG/DL    Creatinine 1.68 (H) 0.70 - 1.30 MG/DL    BUN/Creatinine ratio 21 (H) 12 - 20      GFR est AA 47 (L) >60 ml/min/1.73m2    GFR est non-AA 39 (L) >60 ml/min/1.73m2    Calcium 8.0 (L) 8.5 - 10.1 MG/DL    Phosphorus 2.8 2.6 - 4.7 MG/DL    Albumin 2.4 (L) 3.5 - 5.0 g/dL   SAMPLES BEING HELD   Result Value Ref Range    SAMPLES BEING HELD 1GREEN     COMMENT        Add-on orders for these samples will be processed based on acceptable specimen integrity and analyte stability, which may vary by analyte. EKG, 12 LEAD, INITIAL   Result Value Ref Range    Ventricular Rate 97 BPM    Atrial Rate 97 BPM    P-R Interval 142 ms    QRS Duration 82 ms    Q-T Interval 338 ms    QTC Calculation (Bezet) 429 ms    Calculated P Axis 63 degrees    Calculated R Axis 1 degrees    Calculated T Axis 46 degrees    Diagnosis       Normal sinus rhythm  Increased R/S ratio in V1, consider early transition or posterior infarct  Abnormal ECG  No previous ECGs available  Confirmed by Clinton Brown MD, --- ([id number]) on 5/5/2021 6:32:13 PM         No results found for this visit on 08/30/21. Assessment/ Plan:     1. Elbow pain, left  We discussed rest,elevation application of ice/heat alternating with a cloth barrier to protect the skin. Consider physical therapy if symptoms persist or worsen. Imaging ordered if symptoms persist or worsen. - XR ELBOW LT MIN 3 V; Future    2. Visit for suture removal  Note attached      Orders Placed This Encounter    XR ELBOW LT MIN 3 V     Standing Status:   Future     Number of Occurrences:   1     Standing Expiration Date:   9/30/2021         Verbal and written instructions (see AVS) provided. Patient expresses understanding of diagnosis and treatment plan.     Health Maintenance Due   Topic Date Due    Pneumococcal 65+ years (1 of 1 - PPSV23) Never done    Flu Vaccine (1) 09/01/2021               JON Nunez

## 2021-09-29 ENCOUNTER — TRANSCRIBE ORDER (OUTPATIENT)
Dept: EMERGENCY DEPT | Age: 86
End: 2021-09-29

## 2021-09-29 ENCOUNTER — HOSPITAL ENCOUNTER (OUTPATIENT)
Dept: GENERAL RADIOLOGY | Age: 86
Discharge: HOME | End: 2021-09-29
Payer: MEDICARE

## 2021-09-29 DIAGNOSIS — M25.562 LEFT KNEE PAIN: ICD-10-CM

## 2021-09-29 DIAGNOSIS — M25.562 LEFT KNEE PAIN: Primary | ICD-10-CM

## 2021-09-29 PROCEDURE — 73562 X-RAY EXAM OF KNEE 3: CPT

## 2021-12-15 ENCOUNTER — OFFICE VISIT (OUTPATIENT)
Dept: FAMILY MEDICINE CLINIC | Age: 86
End: 2021-12-15
Payer: MEDICARE

## 2021-12-15 VITALS
OXYGEN SATURATION: 99 % | BODY MASS INDEX: 27.94 KG/M2 | SYSTOLIC BLOOD PRESSURE: 134 MMHG | WEIGHT: 178 LBS | TEMPERATURE: 97 F | HEART RATE: 66 BPM | RESPIRATION RATE: 20 BRPM | DIASTOLIC BLOOD PRESSURE: 60 MMHG | HEIGHT: 67 IN

## 2021-12-15 DIAGNOSIS — I10 PRIMARY HYPERTENSION: Primary | ICD-10-CM

## 2021-12-15 PROCEDURE — 99213 OFFICE O/P EST LOW 20 MIN: CPT | Performed by: NURSE PRACTITIONER

## 2021-12-15 NOTE — PROGRESS NOTES
1. Have you been to the ER, urgent care clinic since your last visit? Hospitalized since your last visit? No    2. Have you seen or consulted any other health care providers outside of the [address] [place] [address] since your last visit? Include any pap smears or colon screening.  No      Chief Complaint   Patient presents with    Hypertension         Visit Vitals  /60 (BP 1 Location: Right upper arm, BP Patient Position: Sitting, BP Cuff Size: Adult)   Pulse 66   Temp 97 °F (36.1 °C) (Temporal)   Resp 20   Ht 5' 7\" (1.702 m)   Wt 178 lb (80.7 kg)   SpO2 99%   BMI 27.88 kg/m²       Pain Scale: 0 - No pain/10  Pain Location:

## 2021-12-16 NOTE — PROGRESS NOTES
Subjective:     Marce Child is a 81 y.o. 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 Blood pressure normotensive. Has had elevated elevated BP in first thing in the morning. We discussed checking BP an hour after taking antihypertensives. depression screening addressed not at risk    abuse screening addressed denies    learning assessment addressed reviewed nurses notes    fall risk addressed not at risk    HM: addressed acute visit for BP declines HM.    ROS:  Gen: denies fever, chills, fatigue, weight loss, weight gain  HEENT:denies blurry vision, nasal congestion, sore throat  Resp: denies dypsnea, cough, wheezing  CV: denies chest pain radiating to the jaws or arms, palpitations, lower extremity edema  Abd: denies nausea, vomiting, diarrhea, constipation  Neuro: denies numbness/tingling  Endo: denies polyuria, polydipsia, heat/cold intolerance  Heme: no lymphadenopathy    No Known Allergies      Current Outpatient Medications:     methIMAzole (TAPAZOLE) 5 mg tablet, Take 1 Tablet by mouth daily.  PLEASE SEND FUTURE REFILL REQUESTS TO PCP OR NEW ENDOCRINOLOGIST AS DR MONROY IS NO LONGER AT THIS PRACTICE, Disp: 90 Tablet, Rfl: 1    hydroCHLOROthiazide (HYDRODIURIL) 25 mg tablet, TAKE 1 TABLET BY MOUTH EVERY DAY, Disp: , Rfl: 0    VIT A/VIT C/VIT E/ZINC/COPPER (PRESERVISION AREDS PO), Take  by mouth two (2) times a day., Disp: , Rfl:     irbesartan (AVAPRO) 300 mg tablet, TAKE 1 TABLET BY MOUTH EVERY DAY FOR BLOOD PRESSURE, Disp: , Rfl: 3    cholecalciferol, VITAMIN D3, (VITAMIN D3) 5,000 unit tab tablet, Take  by mouth two (2) times a day., Disp: , Rfl:     atorvastatin (LIPITOR) 10 mg tablet, Take  by mouth daily. , Disp: , Rfl:     hydroCHLOROthiazide (HYDRODIURIL) 12.5 mg tablet, Take 12.5 mg by mouth daily. (Patient not taking: Reported on 12/15/2021), Disp: , Rfl:     cetirizine (ZYRTEC) 10 mg tablet, TAKE 1 TABLET BY MOUTH ONCE DAILY FOR 7 DAYS AND THEN AS NEEDED (Patient not taking: Reported on 12/15/2021), Disp: 30 Tab, Rfl: 5    tadalafil (CIALIS) 10 mg tablet, TAKE 1 TABLET BY MOUTH ONCE DAILY (Patient not taking: Reported on 12/15/2021), Disp: , Rfl: 1    peg 400-propylene glycol (SYSTANE, PROPYLENE GLYCOL,) 0.4-0.3 % drop, Apply 1 Drop to eye. Several times per day, Disp: , Rfl:     aspirin delayed-release 81 mg tablet, Take  by mouth every fourty-eight (48) hours. (Patient not taking: Reported on 12/15/2021), Disp: , Rfl:     B.infantis-B.ani-B.long-B.bifi 10-15 mg TbEC, Take  by mouth daily. 1 tab every other day (Patient not taking: Reported on 12/15/2021), Disp: , Rfl:     amLODIPine (NORVASC) 10 mg tablet, Take  by mouth daily. (Patient not taking: Reported on 12/15/2021), Disp: , Rfl:     tamsulosin (FLOMAX) 0.4 mg capsule, Take 0.4 mg by mouth daily.  (Patient not taking: Reported on 12/15/2021), Disp: , Rfl:     Past Medical History:   Diagnosis Date    Acute gouty arthropathy     Benign hypertensive heart disease without heart failure     Hypertrophy of prostate without urinary obstruction and other lower urinary tract symptoms (LUTS)     Memory difficulties     Other and unspecified hyperlipidemia        Past Surgical History:   Procedure Laterality Date    HX CATARACT REMOVAL  01/01/2008    Rt    HX COLONOSCOPY  7/15    [place], q 10 yrs    HX HERNIA REPAIR  01/01/1980       Social History     Tobacco Use   Smoking Status Never Smoker   Smokeless Tobacco Never Used       Social History     Socioeconomic History    Marital status:    Tobacco Use    Smoking status: Never Smoker    Smokeless tobacco: Never Used   Vaping Use    Vaping Use: Never used   Substance and Sexual Activity    Alcohol use: Yes     Alcohol/week: 1.7 standard drinks     Types: 2 Standard drinks or equivalent per week     Comment: social       Family History   Problem Relation Age of Onset    Hypertension Mother     Stroke Mother     Hypertension Father     Stroke Father     Breast Cancer Sister          Objective:     Visit Vitals  /60 (BP 1 Location: Right upper arm, BP Patient Position: Sitting, BP Cuff Size: Adult)   Pulse 66   Temp 97 °F (36.1 °C) (Temporal)   Resp 20   Ht 5' 7\" (1.702 m)   Wt 178 lb (80.7 kg)   SpO2 99%   BMI 27.88 kg/m²     Body mass index is 27.88 kg/m². General: Alert and oriented. No acute distress. Well nourished  HEENT :  Ears:TMs are normal. Canals are clear. Eyes: pupils equal, round, react to light and accommodation. Extra ocular movements intact. Nose: patent. Mouth and throat is clear. Neck:supple full range of motion no thyromegaly. Trachea midline, No carotid bruits. No significant lymphadenopathy  Lungs[de-identified] clear to auscultation without wheezes, rales, or rhonchi. Heart :RRR, S1 & S2 are normal intensity. No murmur; no gallop  Abdomen: bowel sounds active. No tenderness, guarding, rebound, masses, hepatic or spleen enlargement  Back: no CVA tenderness. Extremities: without clubbing, cyanosis, or edema  Pulses: radial and femoral pulses are normal  Neuro: HMF intact. Cranial nerves II through XII grossly normal.  Motor: is 5 over 5 and symmetrical.   Deep tendon reflexes: +2 equal  Psych:appropriate behavior, mood, affect and judgement.    Results for orders placed or performed during the hospital encounter of 05/02/21   CBC WITH AUTOMATED DIFF   Result Value Ref Range    WBC 6.9 4.1 - 11.1 K/uL RBC 4.20 4. 10 - 5.70 M/uL    HGB 13.6 12.1 - 17.0 g/dL    HCT 39.8 36.6 - 50.3 %    MCV 94.8 80.0 - 99.0 FL    MCH 32.4 26.0 - 34.0 PG    MCHC 34.2 30.0 - 36.5 g/dL    RDW 12.8 11.5 - 14.5 %    PLATELET 075 759 - 654 K/uL    MPV 9.0 8.9 - 12.9 FL    NRBC 0.0 0  WBC    ABSOLUTE NRBC 0.00 0.00 - 0.01 K/uL    NEUTROPHILS 80 (H) 32 - 75 %    LYMPHOCYTES 11 (L) 12 - 49 %    MONOCYTES 7 5 - 13 %    EOSINOPHILS 2 0 - 7 %    BASOPHILS 0 0 - 1 %    IMMATURE GRANULOCYTES 0 0.0 - 0.5 %    ABS. NEUTROPHILS 5.5 1.8 - 8.0 K/UL    ABS. LYMPHOCYTES 0.8 0.8 - 3.5 K/UL    ABS. MONOCYTES 0.5 0.0 - 1.0 K/UL    ABS. EOSINOPHILS 0.1 0.0 - 0.4 K/UL    ABS. BASOPHILS 0.0 0.0 - 0.1 K/UL    ABS. IMM. GRANS. 0.0 0.00 - 0.04 K/UL    DF SMEAR SCANNED      RBC COMMENTS NORMOCYTIC, NORMOCHROMIC     METABOLIC PANEL, COMPREHENSIVE   Result Value Ref Range    Sodium 142 136 - 145 mmol/L    Potassium 3.9 3.5 - 5.1 mmol/L    Chloride 103 97 - 108 mmol/L    CO2 30 21 - 32 mmol/L    Anion gap 9 5 - 15 mmol/L    Glucose 133 (H) 65 - 100 mg/dL    BUN 28 (H) 6 - 20 MG/DL    Creatinine 1.75 (H) 0.70 - 1.30 MG/DL    BUN/Creatinine ratio 16 12 - 20      GFR est AA 45 (L) >60 ml/min/1.73m2    GFR est non-AA 37 (L) >60 ml/min/1.73m2    Calcium 8.7 8.5 - 10.1 MG/DL    Bilirubin, total 0.5 0.2 - 1.0 MG/DL    ALT (SGPT) 26 12 - 78 U/L    AST (SGOT) 27 15 - 37 U/L    Alk.  phosphatase 123 (H) 45 - 117 U/L    Protein, total 7.1 6.4 - 8.2 g/dL    Albumin 3.8 3.5 - 5.0 g/dL    Globulin 3.3 2.0 - 4.0 g/dL    A-G Ratio 1.2 1.1 - 2.2     METABOLIC PANEL, BASIC   Result Value Ref Range    Sodium 137 136 - 145 mmol/L    Potassium 3.9 3.5 - 5.1 mmol/L    Chloride 102 97 - 108 mmol/L    CO2 26 21 - 32 mmol/L    Anion gap 9 5 - 15 mmol/L    Glucose 116 (H) 65 - 100 mg/dL    BUN 31 (H) 6 - 20 MG/DL    Creatinine 1.58 (H) 0.70 - 1.30 MG/DL    BUN/Creatinine ratio 20 12 - 20      GFR est AA 51 (L) >60 ml/min/1.73m2    GFR est non-AA 42 (L) >60 ml/min/1.73m2    Calcium 8.3 (L) 8.5 - 10.1 MG/DL   MAGNESIUM   Result Value Ref Range    Magnesium 1.9 1.6 - 2.4 mg/dL   CBC WITH AUTOMATED DIFF   Result Value Ref Range    WBC 6.7 4.1 - 11.1 K/uL    RBC 3.64 (L) 4.10 - 5.70 M/uL    HGB 11.6 (L) 12.1 - 17.0 g/dL    HCT 34.6 (L) 36.6 - 50.3 %    MCV 95.1 80.0 - 99.0 FL    MCH 31.9 26.0 - 34.0 PG    MCHC 33.5 30.0 - 36.5 g/dL    RDW 12.6 11.5 - 14.5 %    PLATELET 211 570 - 964 K/uL    MPV 9.3 8.9 - 12.9 FL    NRBC 0.0 0  WBC    ABSOLUTE NRBC 0.00 0.00 - 0.01 K/uL    NEUTROPHILS 71 32 - 75 %    LYMPHOCYTES 13 12 - 49 %    MONOCYTES 14 (H) 5 - 13 %    EOSINOPHILS 2 0 - 7 %    BASOPHILS 0 0 - 1 %    IMMATURE GRANULOCYTES 0 0.0 - 0.5 %    ABS. NEUTROPHILS 4.8 1.8 - 8.0 K/UL    ABS. LYMPHOCYTES 0.9 0.8 - 3.5 K/UL    ABS. MONOCYTES 0.9 0.0 - 1.0 K/UL    ABS. EOSINOPHILS 0.1 0.0 - 0.4 K/UL    ABS. BASOPHILS 0.0 0.0 - 0.1 K/UL    ABS. IMM.  GRANS. 0.0 0.00 - 0.04 K/UL    DF AUTOMATED     URINALYSIS W/ REFLEX CULTURE    Specimen: Urine   Result Value Ref Range    Color YELLOW/STRAW      Appearance CLEAR CLEAR      Specific gravity 1.025 1.003 - 1.030      pH (UA) 6.0 5.0 - 8.0      Protein Negative NEG mg/dL    Glucose Negative NEG mg/dL    Ketone Negative NEG mg/dL    Bilirubin Negative NEG      Blood Negative NEG      Urobilinogen 0.2 0.2 - 1.0 EU/dL    Nitrites Negative NEG      Leukocyte Esterase Negative NEG      WBC 0-4 0 - 4 /hpf    RBC 0-5 0 - 5 /hpf    Epithelial cells FEW FEW /lpf    Bacteria Negative NEG /hpf    UA:UC IF INDICATED CULTURE NOT INDICATED BY UA RESULT CNI      Amorphous Crystals FEW (A) NEG     CBC WITH AUTOMATED DIFF   Result Value Ref Range    WBC 7.4 4.1 - 11.1 K/uL    RBC 3.26 (L) 4.10 - 5.70 M/uL    HGB 10.3 (L) 12.1 - 17.0 g/dL    HCT 30.9 (L) 36.6 - 50.3 %    MCV 94.8 80.0 - 99.0 FL    MCH 31.6 26.0 - 34.0 PG    MCHC 33.3 30.0 - 36.5 g/dL    RDW 12.3 11.5 - 14.5 %    PLATELET 127 897 - 071 K/uL    MPV 9.4 8.9 - 12.9 FL    NRBC 0.0 0  WBC    ABSOLUTE NRBC 0. 00 0.00 - 0.01 K/uL    NEUTROPHILS 74 32 - 75 %    LYMPHOCYTES 11 (L) 12 - 49 %    MONOCYTES 11 5 - 13 %    EOSINOPHILS 3 0 - 7 %    BASOPHILS 0 0 - 1 %    IMMATURE GRANULOCYTES 1 (H) 0.0 - 0.5 %    ABS. NEUTROPHILS 5.5 1.8 - 8.0 K/UL    ABS. LYMPHOCYTES 0.8 0.8 - 3.5 K/UL    ABS. MONOCYTES 0.8 0.0 - 1.0 K/UL    ABS. EOSINOPHILS 0.2 0.0 - 0.4 K/UL    ABS. BASOPHILS 0.0 0.0 - 0.1 K/UL    ABS. IMM. GRANS. 0.1 (H) 0.00 - 0.04 K/UL    DF SMEAR SCANNED      RBC COMMENTS NORMOCYTIC, NORMOCHROMIC     RENAL FUNCTION PANEL   Result Value Ref Range    Sodium 134 (L) 136 - 145 mmol/L    Potassium 4.6 3.5 - 5.1 mmol/L    Chloride 99 97 - 108 mmol/L    CO2 28 21 - 32 mmol/L    Anion gap 7 5 - 15 mmol/L    Glucose 117 (H) 65 - 100 mg/dL    BUN 35 (H) 6 - 20 MG/DL    Creatinine 1.68 (H) 0.70 - 1.30 MG/DL    BUN/Creatinine ratio 21 (H) 12 - 20      GFR est AA 47 (L) >60 ml/min/1.73m2    GFR est non-AA 39 (L) >60 ml/min/1.73m2    Calcium 8.0 (L) 8.5 - 10.1 MG/DL    Phosphorus 2.8 2.6 - 4.7 MG/DL    Albumin 2.4 (L) 3.5 - 5.0 g/dL   SAMPLES BEING HELD   Result Value Ref Range    SAMPLES BEING HELD 1GREEN     COMMENT        Add-on orders for these samples will be processed based on acceptable specimen integrity and analyte stability, which may vary by analyte. EKG, 12 LEAD, INITIAL   Result Value Ref Range    Ventricular Rate 97 BPM    Atrial Rate 97 BPM    P-R Interval 142 ms    QRS Duration 82 ms    Q-T Interval 338 ms    QTC Calculation (Bezet) 429 ms    Calculated P Axis 63 degrees    Calculated R Axis 1 degrees    Calculated T Axis 46 degrees    Diagnosis       Normal sinus rhythm  Increased R/S ratio in V1, consider early transition or posterior infarct  Abnormal ECG  No previous ECGs available  Confirmed by Brigido Andrew MD, --- ([id number]) on 5/5/2021 6:32:13 PM         No results found for this visit on 12/15/21. Assessment/ Plan:     1.  Primary hypertension  BP in goal we discussed checking BP 1 hour after taking BP medication. No orders of the defined types were placed in this encounter. Verbal and written instructions (see AVS) provided. Patient expresses understanding of diagnosis and treatment plan.     Health Maintenance Due   Topic Date Due    Shingrix Vaccine Age 49> (1 of 2) Never done    Pneumococcal 65+ years (1 of 1 - PPSV23) Never done    Flu Vaccine (1) 09/01/2021    Lipid Screen  10/21/2021               JON Witt

## 2022-02-25 ENCOUNTER — OFFICE VISIT (OUTPATIENT)
Dept: FAMILY MEDICINE CLINIC | Age: 87
End: 2022-02-25
Payer: MEDICARE

## 2022-02-25 VITALS
TEMPERATURE: 97.6 F | BODY MASS INDEX: 27.94 KG/M2 | HEART RATE: 77 BPM | DIASTOLIC BLOOD PRESSURE: 62 MMHG | OXYGEN SATURATION: 98 % | HEIGHT: 67 IN | WEIGHT: 178 LBS | SYSTOLIC BLOOD PRESSURE: 130 MMHG | RESPIRATION RATE: 16 BRPM

## 2022-02-25 DIAGNOSIS — E78.2 MIXED HYPERLIPIDEMIA: ICD-10-CM

## 2022-02-25 DIAGNOSIS — I10 PRIMARY HYPERTENSION: ICD-10-CM

## 2022-02-25 DIAGNOSIS — K21.9 GASTROESOPHAGEAL REFLUX DISEASE, UNSPECIFIED WHETHER ESOPHAGITIS PRESENT: ICD-10-CM

## 2022-02-25 DIAGNOSIS — E05.00 GRAVES DISEASE: ICD-10-CM

## 2022-02-25 DIAGNOSIS — Z00.00 MEDICARE ANNUAL WELLNESS VISIT, SUBSEQUENT: Primary | ICD-10-CM

## 2022-02-25 PROCEDURE — G0439 PPPS, SUBSEQ VISIT: HCPCS | Performed by: NURSE PRACTITIONER

## 2022-02-25 PROCEDURE — 99214 OFFICE O/P EST MOD 30 MIN: CPT | Performed by: NURSE PRACTITIONER

## 2022-02-25 RX ORDER — HYDROCHLOROTHIAZIDE 25 MG/1
25 TABLET ORAL DAILY
Qty: 30 TABLET | Refills: 0
Start: 2022-02-25

## 2022-02-25 RX ORDER — METHIMAZOLE 5 MG/1
5 TABLET ORAL DAILY
Qty: 90 TABLET | Refills: 1 | Status: SHIPPED | OUTPATIENT
Start: 2022-02-25 | End: 2022-06-24 | Stop reason: SDUPTHER

## 2022-02-25 NOTE — PROGRESS NOTES
1. Have you been to the ER, urgent care clinic since your last visit? Hospitalized since your last visit? No    2. Have you seen or consulted any other health care providers outside of the [address] [place] [address] since your last visit? Include any pap smears or colon screening. No   This is the Subsequent Medicare Annual Wellness Exam, performed 12 months or more after the Initial AWV or the last Subsequent AWV    I have reviewed the patient's medical history in detail and updated the computerized patient record. Assessment/Plan   Education and counseling provided:  Are appropriate based on today's review and evaluation    1. Medicare annual wellness visit, subsequent  2. Primary hypertension  -     COLLECTION VENOUS BLOOD,VENIPUNCTURE; Future  -     CBC WITH AUTOMATED DIFF; Future  -     LIPID PANEL; Future  -     METABOLIC PANEL, COMPREHENSIVE; Future  3. Graves disease  -     COLLECTION VENOUS BLOOD,VENIPUNCTURE; Future  -     TSH 3RD GENERATION; Future  -     T4, FREE; Future  4. Mixed hyperlipidemia  5. Gastroesophageal reflux disease, unspecified whether esophagitis present       Depression Risk Factor Screening:     3 most recent PHQ Screens 2/25/2022   Little interest or pleasure in doing things Not at all   Feeling down, depressed, irritable, or hopeless Not at all   Total Score PHQ 2 0       Alcohol & Drug Abuse Risk Screen    Do you average more than 1 drink per night or more than 7 drinks a week: Yes    In the past three months have you have had more than 4 drinks containing alcohol on one occasion: Yes          Functional Ability and Level of Safety:    Hearing: Hearing is good. Activities of Daily Living: The home contains: handrails and grab bars  Patient does total self care      Ambulation: with no difficulty     Fall Risk:  Fall Risk Assessment, last 12 mths 2/25/2022   Able to walk? Yes   Fall in past 12 months? 0   Do you feel unsteady?  0   Are you worried about falling 0   Is TUG test greater than 12 seconds? -   Is the gait abnormal? -   Number of falls in past 12 months -   Fall with injury? -      Abuse Screen:  Patient is not abused       Cognitive Screening    Has your family/caregiver stated any concerns about your memory: no    Cognitive Screening: Normal - MMSE (Mini Mental Status Exam)    Health Maintenance Due     Health Maintenance Due   Topic Date Due    Shingrix Vaccine Age 49> (1 of 2) Never done    Pneumococcal 65+ years (1 of 1 - PPSV23) Never done    Medicare Yearly Exam  04/18/2020    Flu Vaccine (1) 09/01/2021    Lipid Screen  10/21/2021       Patient Care Team   Patient Care Team:  Alicia Easton NP as PCP - General (Nurse Practitioner)  Alicia Easton NP as PCP - 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    Graves disease E05.00     Past Medical History:   Diagnosis Date    Acute gouty arthropathy     Benign hypertensive heart disease without heart failure     Hypertrophy of prostate without urinary obstruction and other lower urinary tract symptoms (LUTS)     Memory difficulties     Other and unspecified hyperlipidemia       Past Surgical History:   Procedure Laterality Date    HX CATARACT REMOVAL  01/01/2008    Rt    HX COLONOSCOPY  7/15    [place], q 10 yrs    HX HERNIA REPAIR  01/01/1980     Current Outpatient Medications   Medication Sig Dispense Refill    methIMAzole (TAPAZOLE) 5 mg tablet Take 1 Tablet by mouth daily. 90 Tablet 1    hydroCHLOROthiazide (HYDRODIURIL) 25 mg tablet Take 1 Tablet by mouth daily.  30 Tablet 0    peg 400-propylene glycol (SYSTANE, PROPYLENE GLYCOL,) 0.4-0.3 % drop Apply 1 Drop to eye. Several times per day      VIT A/VIT C/VIT E/ZINC/COPPER (PRESERVISION AREDS PO) Take  by mouth two (2) times a day.  irbesartan (AVAPRO) 300 mg tablet TAKE 1 TABLET BY MOUTH EVERY DAY FOR BLOOD PRESSURE  3    cholecalciferol, VITAMIN D3, (VITAMIN D3) 5,000 unit tab tablet Take  by mouth two (2) times a day.  B.infantis-B.ani-B.long-B.bifi 10-15 mg TbEC Take  by mouth daily. 1 tab every other day      amLODIPine (NORVASC) 10 mg tablet Take  by mouth daily.  atorvastatin (LIPITOR) 10 mg tablet Take  by mouth daily. No Known Allergies    Family History   Problem Relation Age of Onset    Hypertension Mother     Stroke Mother     Hypertension Father     Stroke Father     Breast Cancer Sister      Social History     Tobacco Use    Smoking status: Never Smoker    Smokeless tobacco: Never Used   Substance Use Topics    Alcohol use:  Yes     Alcohol/week: 1.7 standard drinks     Types: 2 Standard drinks or equivalent per week     Comment: social Onesimo Goyal

## 2022-02-25 NOTE — PATIENT INSTRUCTIONS
We are going to check some labs today. Take your irbesartan at bedtime for blood pressure and your hydrochlorothiazide and your amlodipine in the morning. I've restarted your thyroid pill. I sent this to [facility].    Start taking the Prevacid daily x  6-8 weeks. If no change, let me know and we will get some imaging. The best way to stay healthy is to live a healthy lifestyle. A healthy lifestyle includes regular exercise, eating a well-balanced diet, keeping a healthy weight and not smoking. Regular physical exams and screening tests are another important way to take care of yourself. Preventive exams provided by health care providers can find health problems early when treatment works best and can keep you from getting certain diseases or illnesses. Preventive services include exams, lab tests, screenings, shots, monitoring and information to help you take care of your own health. All people over 65 should have a pneumonia shot. Pneumonia shots are usually only needed once in a lifetime unless your doctor decides differently. In addition to your physical exam, some screening tests are recommended:    All people over 65 should have a yearly flu shot. People over 65 are at medium to high risk for Hepatitis B. Three shots are needed for complete protection. Bone mass measurement (dexa scan) is recommended every two years. Diabetes Mellitus screening is recommended every year. Glaucoma is an eye disease caused by high pressure in the eye. An eye exam is recommended every year. Cardiovascular screening tests that check your cholesterol and other blood fat (lipid) levels are recommended every five years. Colorectal Cancer screening tests help to find pre-cancerous polyps (growths in the colon) so they can be removed before they turn into cancer. Tests ordered for screening depend on your personal and family history risk factors.     Prostate Cancer Screening (annually up to age 76)    Screening for breast cancer is recommended yearly with a Mammogram.    Screening for cervical and vaginal cancer is recommended with a pelvic and Pap test every two years. However if you have had an abnormal pap in the past  three years or at high risk for cervical or vaginal cancer Medicare will cover a pap test and a pelvic exam every year.      Here is a list of your current Health Maintenance items with a due date:  Health Maintenance Due   Topic Date Due    Shingles Vaccine (1 of 2) Never done    Pneumococcal Vaccine (1 of 1 - PPSV23) Never done    Annual Well Visit  04/18/2020    Yearly Flu Vaccine (1) 09/01/2021    Cholesterol Test   10/21/2021

## 2022-02-26 LAB
ALBUMIN SERPL-MCNC: 3.5 G/DL (ref 3.5–5)
ALBUMIN/GLOB SERPL: 1.1 {RATIO} (ref 1.1–2.2)
ALP SERPL-CCNC: 115 U/L (ref 45–117)
ALT SERPL-CCNC: 26 U/L (ref 12–78)
ANION GAP SERPL CALC-SCNC: 5 MMOL/L (ref 5–15)
AST SERPL-CCNC: 18 U/L (ref 15–37)
BASOPHILS # BLD: 0 K/UL (ref 0–0.1)
BASOPHILS NFR BLD: 1 % (ref 0–1)
BILIRUB SERPL-MCNC: 0.6 MG/DL (ref 0.2–1)
BUN SERPL-MCNC: 30 MG/DL (ref 6–20)
BUN/CREAT SERPL: 23 (ref 12–20)
CALCIUM SERPL-MCNC: 9.3 MG/DL (ref 8.5–10.1)
CHLORIDE SERPL-SCNC: 104 MMOL/L (ref 97–108)
CHOLEST SERPL-MCNC: 172 MG/DL
CO2 SERPL-SCNC: 29 MMOL/L (ref 21–32)
CREAT SERPL-MCNC: 1.31 MG/DL (ref 0.7–1.3)
DIFFERENTIAL METHOD BLD: ABNORMAL
EOSINOPHIL # BLD: 0.1 K/UL (ref 0–0.4)
EOSINOPHIL NFR BLD: 3 % (ref 0–7)
ERYTHROCYTE [DISTWIDTH] IN BLOOD BY AUTOMATED COUNT: 12.5 % (ref 11.5–14.5)
GLOBULIN SER CALC-MCNC: 3.3 G/DL (ref 2–4)
GLUCOSE SERPL-MCNC: 97 MG/DL (ref 65–100)
HCT VFR BLD AUTO: 41.6 % (ref 36.6–50.3)
HDLC SERPL-MCNC: 49 MG/DL
HDLC SERPL: 3.5 {RATIO} (ref 0–5)
HGB BLD-MCNC: 13.3 G/DL (ref 12.1–17)
IMM GRANULOCYTES # BLD AUTO: 0 K/UL (ref 0–0.04)
IMM GRANULOCYTES NFR BLD AUTO: 0 % (ref 0–0.5)
LDLC SERPL CALC-MCNC: 91.8 MG/DL (ref 0–100)
LYMPHOCYTES # BLD: 1.1 K/UL (ref 0.8–3.5)
LYMPHOCYTES NFR BLD: 24 % (ref 12–49)
MCH RBC QN AUTO: 31.7 PG (ref 26–34)
MCHC RBC AUTO-ENTMCNC: 32 G/DL (ref 30–36.5)
MCV RBC AUTO: 99.3 FL (ref 80–99)
MONOCYTES # BLD: 0.6 K/UL (ref 0–1)
MONOCYTES NFR BLD: 13 % (ref 5–13)
NEUTS SEG # BLD: 2.6 K/UL (ref 1.8–8)
NEUTS SEG NFR BLD: 59 % (ref 32–75)
NRBC # BLD: 0 K/UL (ref 0–0.01)
NRBC BLD-RTO: 0 PER 100 WBC
PLATELET # BLD AUTO: 228 K/UL (ref 150–400)
PMV BLD AUTO: 9.9 FL (ref 8.9–12.9)
POTASSIUM SERPL-SCNC: 4.1 MMOL/L (ref 3.5–5.1)
PROT SERPL-MCNC: 6.8 G/DL (ref 6.4–8.2)
RBC # BLD AUTO: 4.19 M/UL (ref 4.1–5.7)
SODIUM SERPL-SCNC: 138 MMOL/L (ref 136–145)
T4 FREE SERPL-MCNC: 1 NG/DL (ref 0.8–1.5)
TRIGL SERPL-MCNC: 156 MG/DL (ref ?–150)
TSH SERPL DL<=0.05 MIU/L-ACNC: 0.29 UIU/ML (ref 0.36–3.74)
VLDLC SERPL CALC-MCNC: 31.2 MG/DL
WBC # BLD AUTO: 4.4 K/UL (ref 4.1–11.1)

## 2022-03-18 PROBLEM — M1A.09X0 IDIOPATHIC CHRONIC GOUT OF MULTIPLE SITES WITHOUT TOPHUS: Status: ACTIVE | Noted: 2018-03-22

## 2022-03-20 PROBLEM — E05.00 GRAVES DISEASE: Status: ACTIVE | Noted: 2021-05-03

## 2022-05-23 ENCOUNTER — OFFICE VISIT (OUTPATIENT)
Dept: FAMILY MEDICINE CLINIC | Age: 87
End: 2022-05-23
Payer: MEDICARE

## 2022-05-23 VITALS
HEART RATE: 85 BPM | TEMPERATURE: 97.8 F | DIASTOLIC BLOOD PRESSURE: 68 MMHG | HEIGHT: 67 IN | SYSTOLIC BLOOD PRESSURE: 130 MMHG | WEIGHT: 177.6 LBS | BODY MASS INDEX: 27.88 KG/M2 | OXYGEN SATURATION: 97 % | RESPIRATION RATE: 17 BRPM

## 2022-05-23 DIAGNOSIS — M25.512 CHRONIC PAIN OF BOTH SHOULDERS: ICD-10-CM

## 2022-05-23 DIAGNOSIS — G89.29 CHRONIC PAIN OF BOTH SHOULDERS: ICD-10-CM

## 2022-05-23 DIAGNOSIS — M25.511 CHRONIC PAIN OF BOTH SHOULDERS: ICD-10-CM

## 2022-05-23 DIAGNOSIS — R26.81 UNSTEADY GAIT: ICD-10-CM

## 2022-05-23 DIAGNOSIS — E05.00 GRAVES DISEASE: Primary | ICD-10-CM

## 2022-05-23 DIAGNOSIS — N18.30 STAGE 3 CHRONIC KIDNEY DISEASE, UNSPECIFIED WHETHER STAGE 3A OR 3B CKD (HCC): ICD-10-CM

## 2022-05-23 PROCEDURE — 99214 OFFICE O/P EST MOD 30 MIN: CPT | Performed by: NURSE PRACTITIONER

## 2022-05-23 RX ORDER — DEXTROMETHORPHAN HYDROBROMIDE, GUAIFENESIN 5; 100 MG/5ML; MG/5ML
650 LIQUID ORAL EVERY 8 HOURS
Qty: 90 TABLET | Refills: 1 | Status: SHIPPED | OUTPATIENT
Start: 2022-05-23 | End: 2022-08-17 | Stop reason: SDUPTHER

## 2022-05-23 NOTE — PROGRESS NOTES
1. \"Have you been to the ER, urgent care clinic since your last visit? Hospitalized since your last visit? \" No    2. \"Have you seen or consulted any other health care providers outside of the [address] [place] [address] since your last visit? \" No     3. For patients aged 39-70: Has the patient had a colonoscopy / FIT/ Cologuard? Yes - Care Gap present. Most recent result on file      If the patient is female:    4. For patients aged 41-77: Has the patient had a mammogram within the past 2 years? No      5. For patients aged 21-65: Has the patient had a pap smear?  NA - based on age or sex

## 2022-05-23 NOTE — PROGRESS NOTES
Chief Complaint   Patient presents with    Shoulder Pain     neck pain       HPI:    Fox Mckoy is a 81 y.o. male in today for for an acute visit. Hx Graves disease, back on methimazole since February. Upcoming endocrinologist appt in June. HTN, compliant with medications. No CP, HA, visual changes. HLD: Compliant with statin therapy. Follows with PCP in [address] as needed. New issues: In today with a CC of bilateral lower back pain, bilateral shoulder pain x 1 month. Currently using OsteoBiflex with some relief. He tried ibuprofen and it helped but he wasn't sure if it was safe for him to take. He denies any injury. Pains worsen with activity and ease with rest.       No Known Allergies    Current Outpatient Medications   Medication Sig    acetaminophen (Tylenol Arthritis Pain) 650 mg TbER Take 1 Tablet by mouth every eight (8) hours.  methIMAzole (TAPAZOLE) 5 mg tablet Take 1 Tablet by mouth daily.  hydroCHLOROthiazide (HYDRODIURIL) 25 mg tablet Take 1 Tablet by mouth daily.  peg 400-propylene glycol (SYSTANE, PROPYLENE GLYCOL,) 0.4-0.3 % drop Apply 1 Drop to eye. Several times per day    VIT A/VIT C/VIT E/ZINC/COPPER (PRESERVISION AREDS PO) Take  by mouth two (2) times a day.  irbesartan (AVAPRO) 300 mg tablet TAKE 1 TABLET BY MOUTH EVERY DAY FOR BLOOD PRESSURE    cholecalciferol, VITAMIN D3, (VITAMIN D3) 5,000 unit tab tablet Take  by mouth two (2) times a day.  B.infantis-B.ani-B.long-B.bifi 10-15 mg TbEC Take  by mouth daily. 1 tab every other day    amLODIPine (NORVASC) 10 mg tablet Take  by mouth daily.  atorvastatin (LIPITOR) 10 mg tablet Take  by mouth daily. No current facility-administered medications for this visit.        Past Medical History:   Diagnosis Date    Acute gouty arthropathy     Benign hypertensive heart disease without heart failure     Hypertrophy of prostate without urinary obstruction and other lower urinary tract symptoms (LUTS)     Memory difficulties     Other and unspecified hyperlipidemia        Family History   Problem Relation Age of Onset    Hypertension Mother     Stroke Mother     Hypertension Father     Stroke Father     Breast Cancer Sister        ROS:  Denies fever, chills, cough, chest pain, SOB,  nausea, vomiting, diarrhea, dysuria. Denies rashes, wounds, + arthralgias, weakness, numbness, visual changes, depression. Denies wt loss, wt gain, hemoptysis, hematochezia or melena. Patient is not experiencing chest pain radiating to the jaw and/or down the arms. Physical Examination:    /68 (BP 1 Location: Right arm, BP Patient Position: Sitting, BP Cuff Size: Adult)   Pulse 85   Temp 97.8 °F (36.6 °C) (Temporal)   Resp 17   Ht 5' 7\" (1.702 m)   Wt 177 lb 9.6 oz (80.6 kg)   SpO2 97%   BMI 27.82 kg/m²     Wt Readings from Last 3 Encounters:   05/23/22 177 lb 9.6 oz (80.6 kg)   02/25/22 178 lb (80.7 kg)   12/15/21 178 lb (80.7 kg)     Constitutional: WDWN Male in no acute distress  HENT:  NC/AT  EYES: EOMI, PERRL  Neck:  Supple, no JVD, mass or bruit. No thyromegaly. Respiratory:  Respirations even and unlabored without use of accessory muscles, CTA throughout without wheezes, rales, rubs or rhonchi. Symmetrical chest expansion. Cardiac: RRR  Musculoskeletal:  No cyanosis, clubbing or edema of extremities. Moves all extremities without difficulty. + empty can on L  Neurologic:  Smooth, even gait without assistance, CN 2-12 grossly intact. Skin: intact and warm to the touch, no rash   Lymphadenopathy: no cervical or supraclavicular nodes  Psych: Pleasant and appropriate. Judgment normal. Alert and oriented x 3.       ASSESSMENT AND PLAN:       ICD-10-CM ICD-9-CM    1. Graves disease  E05.00 242.00 TSH 3RD GENERATION      T4, FREE      COLLECTION VENOUS BLOOD,VENIPUNCTURE      COLLECTION VENOUS BLOOD,VENIPUNCTURE      T4, FREE      TSH 3RD GENERATION   2. Stage 3 chronic kidney disease, unspecified whether stage 3a or 3b CKD ([facility] [address]  N18.30 585.3    3. Chronic pain of both shoulders  M25.511 719.41 acetaminophen (Tylenol Arthritis Pain) 650 mg TbER    G89.29 338.29     M25.512     4. Unsteady gait  R26.81 781.2 REFERRAL TO PHYSICAL THERAPY     Start regularly scheduled doses of Tylenol (LFTs ok and ibuprofen is not recommended d/t CKD), refer to PT. Check thyroid function today. Medication Side Effects and Warnings were discussed with patient:  yes  Patient Labs were reviewed:  yes  Patient Past Records were reviewed:  yes    Patient aware of plan of care and verbalized understanding. Questions answered. RTC PRN.     Speedy Tony NP

## 2022-05-24 LAB
COMMENT, HOLDF: NORMAL
SAMPLES BEING HELD,HOLD: NORMAL
T4 FREE SERPL-MCNC: 0.8 NG/DL (ref 0.8–1.5)
TSH SERPL DL<=0.05 MIU/L-ACNC: 1.55 UIU/ML (ref 0.36–3.74)

## 2022-06-24 ENCOUNTER — OFFICE VISIT (OUTPATIENT)
Dept: ENDOCRINOLOGY | Age: 87
End: 2022-06-24
Payer: MEDICARE

## 2022-06-24 VITALS
SYSTOLIC BLOOD PRESSURE: 142 MMHG | DIASTOLIC BLOOD PRESSURE: 75 MMHG | BODY MASS INDEX: 27.47 KG/M2 | HEART RATE: 68 BPM | WEIGHT: 175 LBS | HEIGHT: 67 IN

## 2022-06-24 DIAGNOSIS — E05.90 SUBCLINICAL HYPERTHYROIDISM: Primary | ICD-10-CM

## 2022-06-24 PROCEDURE — 1123F ACP DISCUSS/DSCN MKR DOCD: CPT | Performed by: INTERNAL MEDICINE

## 2022-06-24 PROCEDURE — G8536 NO DOC ELDER MAL SCRN: HCPCS | Performed by: INTERNAL MEDICINE

## 2022-06-24 PROCEDURE — G8432 DEP SCR NOT DOC, RNG: HCPCS | Performed by: INTERNAL MEDICINE

## 2022-06-24 PROCEDURE — 99214 OFFICE O/P EST MOD 30 MIN: CPT | Performed by: INTERNAL MEDICINE

## 2022-06-24 PROCEDURE — G8427 DOCREV CUR MEDS BY ELIG CLIN: HCPCS | Performed by: INTERNAL MEDICINE

## 2022-06-24 PROCEDURE — G8417 CALC BMI ABV UP PARAM F/U: HCPCS | Performed by: INTERNAL MEDICINE

## 2022-06-24 PROCEDURE — 1101F PT FALLS ASSESS-DOCD LE1/YR: CPT | Performed by: INTERNAL MEDICINE

## 2022-06-24 RX ORDER — METHIMAZOLE 5 MG/1
TABLET ORAL
Qty: 90 TABLET | Refills: 3 | Status: SHIPPED | OUTPATIENT
Start: 2022-06-24

## 2022-06-24 NOTE — PROGRESS NOTES
Chief Complaint   Patient presents with    Thyroid Problem       * New Patient Visit - Last seen by Dr Christopher Martinez 3/21     General:  On methimazole since 2016  Did go off the medication at one point and put back on it (2 per day for 10 days)  Tried 1/2 tab and told to go back to full tablet    Thyroid Symptoms  denies change in energy, denies change in weight, denies change in appetite, denies sleep issues, denies hair changes, no skin changes, denies sweats, denies hot/cold intolerance, denies tremor, denies palpitations, denies change in bowels, no change in menses, denies mood changes      Neck Symptoms  denies dysphagia, denies anterior neck pain, denies neck swelling, notes no nodules      Labs/Studies    Lab Results   Component Value Date/Time    TSH 1.55 05/23/2022 10:00 AM    T3, total 122 01/04/2019 12:22 PM    T4, Free 0.8 05/23/2022 10:00 AM    Thyroid Stim Immunoglobulin 25 06/14/2016 09:55 AM        Lab Results   Component Value Date/Time    TSH 1.55 05/23/2022 10:00 AM    TSH 0.29 (L) 02/25/2022 03:46 PM    TSH 2.060 03/22/2021 12:00 AM    TSH 3.55 10/21/2020 09:42 AM    TSH 0.016 (L) 12/02/2019 04:22 PM    TSH 0.01 (L) 09/30/2019 09:15 AM    TSH 0.601 04/11/2019 11:49 AM    TSH 1.400 01/04/2019 12:22 PM    TSH 1.590 08/24/2018 03:36 PM    TSH 2.860 01/24/2018 11:23 AM    TSH 2.460 05/26/2017 11:06 AM    TSH 1.49 04/06/2017 09:34 AM    TSH 1.900 02/27/2017 04:29 PM    TSH 0.092 (L) 12/30/2016 11:32 AM    TSH 0.149 (L) 11/03/2016 04:01 PM    TSH 0.059 (L) 10/04/2016 01:26 PM    TSH 0.250 (L) 06/14/2016 09:55 AM    TSH 0.156 (L) 06/01/2016 03:14 PM           Past Medical History:   Diagnosis Date    Acute gouty arthropathy     Benign hypertensive heart disease without heart failure     Hypertrophy of prostate without urinary obstruction and other lower urinary tract symptoms (LUTS)     Memory difficulties     Other and unspecified hyperlipidemia     Subclinical hyperthyroidism          Blood pressure Donna Barnes ) 142/75, pulse 68, height 5' 7\" (1.702 m), weight 175 lb (79.4 kg). Weight Metrics 6/24/2022 5/23/2022 2/25/2022 12/15/2021 8/30/2021 5/2/2021 8/17/2020   Weight 175 lb 177 lb 9.6 oz 178 lb 178 lb 174 lb 170 lb 174 lb   BMI 27.41 kg/m2 27.82 kg/m2 27.88 kg/m2 27.88 kg/m2 27.25 kg/m2 26.63 kg/m2 27.25 kg/m2        EXAM:  - GENERAL: NCAT, Appears well nourished   - EYES: EOMI, non-icteric, no proptosis   - Ear/Nose/Throat: NCAT, no visible inflammation or masses   - CARDIOVASCULAR: no cyanosis, no visible JVD   - RESPIRATORY: respiratory effort normal without any distress or labored breathing   - MUSCULOSKELETAL: Normal ROM of neck and upper extremities observed   - SKIN: No rash on face  - NEUROLOGIC:  No facial asymmetry (Cranial nerve 7 motor function), No gaze palsy   - PSYCHIATRIC: Normal affect, Normal insight and judgement      Assessment/Plan:   1. Subclinical hyperthyroidism  No clear cause  When came off methimazole, TSH suppressed again  Says did go to 2.5mg a day but then told to go back to 5mg  So will need to continue long term  Check levels and adjust prn - currently on 5mg per day    There are no diagnoses linked to this encounter. Orders Placed This Encounter    TSH 3RD GENERATION    methIMAzole (TAPAZOLE) 5 mg tablet     Sig: One tablet by mouth once a day     Dispense:  90 Tablet     Refill:  3        Follow-up and Dispositions    · Return in about 1 year (around 6/24/2023).

## 2022-06-24 NOTE — LETTER
6/24/2022    Patient: Christiane Pac   YOB: 1935   Date of Visit: 6/24/2022     Reida Leyden, NP [address]  [address]  Via In Basket    Dear Reida Leyden, NP,      Thank you for referring Mr. Reza Cline to [facility] for evaluation. My notes for this consultation are attached. If you have questions, please do not hesitate to call me. I look forward to following your patient along with you.       Sincerely,    Shai Carrasquillo MD

## 2022-06-25 LAB — TSH SERPL DL<=0.005 MIU/L-ACNC: 2.52 UIU/ML (ref 0.45–4.5)

## 2022-08-04 ENCOUNTER — TELEPHONE (OUTPATIENT)
Dept: FAMILY MEDICINE CLINIC | Age: 87
End: 2022-08-04

## 2022-08-04 DIAGNOSIS — M25.519 ACUTE SHOULDER PAIN, UNSPECIFIED LATERALITY: Primary | ICD-10-CM

## 2022-08-04 NOTE — TELEPHONE ENCOUNTER
----- Message from Elsiechasity Yashira sent at 8/3/2022  4:15 PM EDT -----  Subject: Referral Request    Reason for referral request? Patients therapist would like to do shoulder   therapy but needs a referral sent to them asap. [place] [facility]  Provider patient wants to be referred to(if known):     Provider Phone Number(if known):     Additional Information for Provider?   ---------------------------------------------------------------------------  --------------  [id number] [id number]    [phone]; OK to leave message on voicemail  ---------------------------------------------------------------------------  --------------

## 2022-09-01 ENCOUNTER — HOSPITAL ENCOUNTER (OUTPATIENT)
Dept: LAB | Age: 87
Discharge: HOME | End: 2022-09-01
Payer: MEDICARE

## 2022-09-01 LAB
ALBUMIN SERPL-MCNC: 3.7 G/DL (ref 3.5–5)
ALBUMIN/GLOB SERPL: 1.1 {RATIO} (ref 1.1–2.2)
ALP SERPL-CCNC: 103 U/L (ref 45–117)
ALT SERPL-CCNC: 19 U/L (ref 12–78)
ANION GAP SERPL CALC-SCNC: 10 MMOL/L (ref 5–15)
APPEARANCE UR: CLEAR
AST SERPL-CCNC: 23 U/L (ref 15–37)
BASOPHILS # BLD: 0 K/UL (ref 0–0.1)
BASOPHILS NFR BLD: 1 % (ref 0–1)
BILIRUB SERPL-MCNC: 0.9 MG/DL (ref 0.2–1)
BILIRUB UR QL: NEGATIVE
BUN SERPL-MCNC: 28 MG/DL (ref 6–20)
BUN/CREAT SERPL: 20 (ref 12–20)
CALCIUM SERPL-MCNC: 9.2 MG/DL (ref 8.5–10.1)
CHLORIDE SERPL-SCNC: 103 MMOL/L (ref 97–108)
CHOLEST SERPL-MCNC: 179 MG/DL
CO2 SERPL-SCNC: 28 MMOL/L (ref 21–32)
COLOR UR: NORMAL
CREAT SERPL-MCNC: 1.39 MG/DL (ref 0.7–1.3)
DIFFERENTIAL METHOD BLD: ABNORMAL
EOSINOPHIL # BLD: 0.2 K/UL (ref 0–0.4)
EOSINOPHIL NFR BLD: 4 % (ref 0–7)
ERYTHROCYTE [DISTWIDTH] IN BLOOD BY AUTOMATED COUNT: 12.9 % (ref 11.5–14.5)
GLOBULIN SER CALC-MCNC: 3.4 G/DL (ref 2–4)
GLUCOSE SERPL-MCNC: 111 MG/DL (ref 65–100)
GLUCOSE UR STRIP.AUTO-MCNC: NEGATIVE MG/DL
HCT VFR BLD AUTO: 41.6 % (ref 36.6–50.3)
HDLC SERPL-MCNC: 53 MG/DL
HDLC SERPL: 3.4 {RATIO} (ref 0–5)
HGB BLD-MCNC: 13.8 G/DL (ref 12.1–17)
HGB UR QL STRIP: NEGATIVE
IMM GRANULOCYTES # BLD AUTO: 0 K/UL (ref 0–0.04)
IMM GRANULOCYTES NFR BLD AUTO: 1 % (ref 0–0.5)
KETONES UR QL STRIP.AUTO: NEGATIVE MG/DL
LDLC SERPL CALC-MCNC: 113.2 MG/DL (ref 0–100)
LEUKOCYTE ESTERASE UR QL STRIP.AUTO: NEGATIVE
LYMPHOCYTES # BLD: 0.9 K/UL (ref 0.8–3.5)
LYMPHOCYTES NFR BLD: 24 % (ref 12–49)
MCH RBC QN AUTO: 31.8 PG (ref 26–34)
MCHC RBC AUTO-ENTMCNC: 33.2 G/DL (ref 30–36.5)
MCV RBC AUTO: 95.9 FL (ref 80–99)
MONOCYTES # BLD: 0.4 K/UL (ref 0–1)
MONOCYTES NFR BLD: 10 % (ref 5–13)
NEUTS SEG # BLD: 2.4 K/UL (ref 1.8–8)
NEUTS SEG NFR BLD: 60 % (ref 32–75)
NITRITE UR QL STRIP.AUTO: NEGATIVE
NRBC # BLD: 0 K/UL (ref 0–0.01)
NRBC BLD-RTO: 0 PER 100 WBC
PH UR STRIP: 6 [PH] (ref 5–8)
PLATELET # BLD AUTO: 212 K/UL (ref 150–400)
PMV BLD AUTO: 9.4 FL (ref 8.9–12.9)
POTASSIUM SERPL-SCNC: 3.8 MMOL/L (ref 3.5–5.1)
PROT SERPL-MCNC: 7.1 G/DL (ref 6.4–8.2)
PROT UR STRIP-MCNC: NEGATIVE MG/DL
RBC # BLD AUTO: 4.34 M/UL (ref 4.1–5.7)
SODIUM SERPL-SCNC: 141 MMOL/L (ref 136–145)
SP GR UR REFRACTOMETRY: 1.01 (ref 1–1.03)
T3FREE SERPL-MCNC: 3 PG/ML (ref 2.2–4)
T4 FREE SERPL-MCNC: 0.7 NG/DL (ref 0.8–1.5)
TRIGL SERPL-MCNC: 64 MG/DL (ref ?–150)
TSH SERPL DL<=0.05 MIU/L-ACNC: 2.59 UIU/ML (ref 0.36–3.74)
UROBILINOGEN UR QL STRIP.AUTO: 0.2 EU/DL (ref 0.2–1)
VIT B12 SERPL-MCNC: 434 PG/ML (ref 193–986)
VLDLC SERPL CALC-MCNC: 12.8 MG/DL
WBC # BLD AUTO: 3.9 K/UL (ref 4.1–11.1)

## 2022-09-01 PROCEDURE — 82607 VITAMIN B-12: CPT

## 2022-09-01 PROCEDURE — 36415 COLL VENOUS BLD VENIPUNCTURE: CPT

## 2022-09-01 PROCEDURE — 84439 ASSAY OF FREE THYROXINE: CPT

## 2022-09-01 PROCEDURE — 82306 VITAMIN D 25 HYDROXY: CPT

## 2022-09-01 PROCEDURE — 84443 ASSAY THYROID STIM HORMONE: CPT

## 2022-09-01 PROCEDURE — 80053 COMPREHEN METABOLIC PANEL: CPT

## 2022-09-01 PROCEDURE — 84481 FREE ASSAY (FT-3): CPT

## 2022-09-01 PROCEDURE — 80061 LIPID PANEL: CPT

## 2022-09-01 PROCEDURE — 81003 URINALYSIS AUTO W/O SCOPE: CPT

## 2022-09-01 PROCEDURE — 85025 COMPLETE CBC W/AUTO DIFF WBC: CPT

## 2022-09-02 LAB — 25(OH)D3 SERPL-MCNC: 33.1 NG/ML (ref 30–100)

## 2022-09-19 ENCOUNTER — HOSPITAL ENCOUNTER (OUTPATIENT)
Dept: GENERAL RADIOLOGY | Age: 87
Discharge: HOME | End: 2022-09-19
Payer: MEDICARE

## 2022-09-19 ENCOUNTER — TRANSCRIBE ORDER (OUTPATIENT)
Dept: REGISTRATION | Age: 87
End: 2022-09-19

## 2022-09-19 DIAGNOSIS — M25.512 LEFT SHOULDER PAIN: Primary | ICD-10-CM

## 2022-09-19 DIAGNOSIS — M25.512 LEFT SHOULDER PAIN: ICD-10-CM

## 2022-09-19 PROCEDURE — 73030 X-RAY EXAM OF SHOULDER: CPT

## 2022-10-19 NOTE — PROGRESS NOTES
Chief Complaint   Patient presents with    Epigastric Pain       HPI:    Raissa Cui is a 81 y.o. male in today for his AWV and a few acute concerns. Hx Graves disease, stopped methimazole a few months ago when he ran out. His endocrinologist has left and his next appt is in June. He feels well. HTN, compliant with medications. No CP, HA, visual changes. HLD: Compliant with statin therapy. Follows with PCP in [address] as needed. New issues: In today with a CC of epigastric discomfort x 2 months, it occurs behind his breast bone. He had this pain a few years ago and took OTC PPI x 1 month with resolution. No relation to activity. It does not wake him from sleep. No Known Allergies    Current Outpatient Medications   Medication Sig    methIMAzole (TAPAZOLE) 5 mg tablet Take 1 Tablet by mouth daily.  hydroCHLOROthiazide (HYDRODIURIL) 25 mg tablet Take 1 Tablet by mouth daily.  peg 400-propylene glycol (SYSTANE, PROPYLENE GLYCOL,) 0.4-0.3 % drop Apply 1 Drop to eye. Several times per day    VIT A/VIT C/VIT E/ZINC/COPPER (PRESERVISION AREDS PO) Take  by mouth two (2) times a day.  irbesartan (AVAPRO) 300 mg tablet TAKE 1 TABLET BY MOUTH EVERY DAY FOR BLOOD PRESSURE    cholecalciferol, VITAMIN D3, (VITAMIN D3) 5,000 unit tab tablet Take  by mouth two (2) times a day.  B.infantis-B.ani-B.long-B.bifi 10-15 mg TbEC Take  by mouth daily. 1 tab every other day    amLODIPine (NORVASC) 10 mg tablet Take  by mouth daily.  atorvastatin (LIPITOR) 10 mg tablet Take  by mouth daily. No current facility-administered medications for this visit.        Past Medical History:   Diagnosis Date    Acute gouty arthropathy     Benign hypertensive heart disease without heart failure     Hypertrophy of prostate without urinary obstruction and other lower urinary tract symptoms (LUTS)     Memory difficulties     Other and unspecified hyperlipidemia        Family History   Problem Relation Age of History  Chief Complaint   Patient presents with   • Sore Throat     Sore throat and headache since yesterday  States "my allergies are acting up"     30-year-old female with history of anxiety, asthma, depression who presents for evaluation sore throat and nasal congestion  Patient reports that her symptoms began yesterday  She has had mild sore throat but is still able to eat/drink  She has nasal congestion and had a headache yesterday that has improved today  She otherwise has not had cough, fevers, chest pain, shortness of breath, abdominal pain, nausea/vomiting, diarrhea  No known sick contacts  Patient feels that her symptoms are related to allergies which she usually has a flare of this time of year  She has not taken any medications for her symptoms prior to arrival           Prior to Admission Medications   Prescriptions Last Dose Informant Patient Reported? Taking?   ibuprofen (MOTRIN) 600 mg tablet   No No   Sig: Take 1 tablet (600 mg total) by mouth every 6 (six) hours as needed for mild pain for up to 10 days      Facility-Administered Medications: None       Past Medical History:   Diagnosis Date   • Anxiety    • Asthma    • Depression        History reviewed  No pertinent surgical history  Family History   Problem Relation Age of Onset   • No Known Problems Mother    • No Known Problems Father      I have reviewed and agree with the history as documented  E-Cigarette/Vaping   • E-Cigarette Use Never User      E-Cigarette/Vaping Substances     Social History     Tobacco Use   • Smoking status: Former Smoker     Packs/day: 0 25     Types: Cigarettes     Start date: 5/1/2021   • Smokeless tobacco: Never Used   Vaping Use   • Vaping Use: Never used   Substance Use Topics   • Alcohol use: Not Currently     Comment: rare   • Drug use: Yes     Types: Marijuana     Comment: last used: 5/20/22       Review of Systems   Constitutional: Negative for chills and fever     HENT: Positive for congestion Onset    Hypertension Mother     Stroke Mother     Hypertension Father     Stroke Father     Breast Cancer Sister        ROS:  Denies fever, chills, cough, chest pain, SOB,  nausea, vomiting, diarrhea, dysuria. Denies rashes, wounds, arthralgias, weakness, numbness, visual changes, depression. Denies wt loss, wt gain, hemoptysis, hematochezia or melena. Patient is not experiencing chest pain radiating to the jaw and/or down the arms. Physical Examination:    /62 (BP 1 Location: Right arm, BP Patient Position: Sitting, BP Cuff Size: Adult long)   Pulse 77   Temp 97.6 °F (36.4 °C) (Temporal)   Resp 16   Ht 5' 7\" (1.702 m)   Wt 178 lb (80.7 kg)   SpO2 98%   BMI 27.88 kg/m²     Wt Readings from Last 3 Encounters:   02/25/22 178 lb (80.7 kg)   12/15/21 178 lb (80.7 kg)   08/30/21 174 lb (78.9 kg)     Constitutional: WDWN Male in no acute distress  HENT:  NC/AT  EYES: EOMI, PERRL  Neck:  Supple, no JVD, mass or bruit. No thyromegaly. Respiratory:  Respirations even and unlabored without use of accessory muscles, CTA throughout without wheezes, rales, rubs or rhonchi. Symmetrical chest expansion. Cardiac: RRR  Abdomen:  +BS, soft, nontender without palpable HSM. No epigastric tenderness. Musculoskeletal:  No cyanosis, clubbing or edema of extremities. Moves all extremities without difficulty. Neurologic:  Smooth, even gait without assistance, CN 2-12 grossly intact. Skin: intact and warm to the touch, no rash   Lymphadenopathy: no cervical or supraclavicular nodes  Psych: Pleasant and appropriate. Judgment normal. Alert and oriented x 3. ASSESSMENT AND PLAN:       ICD-10-CM ICD-9-CM    1. Medicare annual wellness visit, subsequent  Z00.00 V70.0    2.  Primary hypertension  I10 401.9 COLLECTION VENOUS BLOOD,VENIPUNCTURE      CBC WITH AUTOMATED DIFF      LIPID PANEL      METABOLIC PANEL, COMPREHENSIVE      METABOLIC PANEL, COMPREHENSIVE      LIPID PANEL      CBC WITH AUTOMATED DIFF and sore throat  Negative for ear pain and trouble swallowing  Eyes: Negative for visual disturbance  Respiratory: Negative for cough, chest tightness and shortness of breath  Cardiovascular: Negative for chest pain and leg swelling  Gastrointestinal: Negative for abdominal pain, diarrhea, nausea and vomiting  Genitourinary: Negative for difficulty urinating and flank pain  Musculoskeletal: Negative for back pain and gait problem  Skin: Negative for pallor and rash  Neurological: Positive for headaches  Negative for syncope, weakness and light-headedness  All other systems reviewed and are negative  Physical Exam  Physical Exam  Vitals and nursing note reviewed  Constitutional:       General: She is not in acute distress  Appearance: She is well-developed  HENT:      Head: Normocephalic and atraumatic  Nose: Congestion present  Mouth/Throat:      Mouth: Mucous membranes are moist       Pharynx: No oropharyngeal exudate or posterior oropharyngeal erythema  Tonsils: No tonsillar exudate or tonsillar abscesses  0 on the right  0 on the left  Comments: Mild posterior oropharyngeal erythema  Eyes:      Extraocular Movements: Extraocular movements intact  Pupils: Pupils are equal, round, and reactive to light  Cardiovascular:      Rate and Rhythm: Normal rate and regular rhythm  Heart sounds: No murmur heard  No friction rub  No gallop  Pulmonary:      Effort: Pulmonary effort is normal       Breath sounds: Normal breath sounds  No wheezing, rhonchi or rales  Abdominal:      General: There is no distension  Palpations: Abdomen is soft  Tenderness: There is no abdominal tenderness  Musculoskeletal:         General: No swelling or tenderness  Normal range of motion  Cervical back: Normal range of motion and neck supple  No rigidity  Skin:     General: Skin is warm and dry  Coloration: Skin is not pale  Findings: No rash  COLLECTION VENOUS BLOOD,VENIPUNCTURE   3. Graves disease  E05.00 242.00 COLLECTION VENOUS BLOOD,VENIPUNCTURE      TSH 3RD GENERATION      T4, FREE      T4, FREE      TSH 3RD GENERATION      COLLECTION VENOUS BLOOD,VENIPUNCTURE   4. Mixed hyperlipidemia  E78.2 272.2    5. Gastroesophageal reflux disease, unspecified whether esophagitis present  K21.9 530.81      See below AWV. He will take OTC Prevacid daily x 6-8 weeks and then stop. If no improvement in symptoms, consider imaging or re-evaluation. Labs as above. Restart methimazole. Rx sent. Stressed the need to have thyroid labs rechecked in 2-3 months. Medication Side Effects and Warnings were discussed with patient:  yes  Patient Labs were reviewed:  yes  Patient Past Records were reviewed:  yes    Patient aware of plan of care and verbalized understanding. Questions answered. RTC PRN.     Shanique Foster NP Neurological:      General: No focal deficit present  Mental Status: She is alert and oriented to person, place, and time  Psychiatric:         Behavior: Behavior normal          Vital Signs  ED Triage Vitals [10/19/22 1119]   Temperature Pulse Respirations Blood Pressure SpO2   98 °F (36 7 °C) 97 18 109/79 100 %      Temp Source Heart Rate Source Patient Position - Orthostatic VS BP Location FiO2 (%)   Oral -- -- -- --      Pain Score       8           Vitals:    10/19/22 1119   BP: 109/79   Pulse: 97         Visual Acuity      ED Medications  Medications   loratadine (CLARITIN) tablet 10 mg (10 mg Oral Given 10/19/22 1140)   acetaminophen (TYLENOL) tablet 975 mg (975 mg Oral Given 10/19/22 1140)   ibuprofen (MOTRIN) tablet 400 mg (400 mg Oral Given 10/19/22 1140)       Diagnostic Studies  Results Reviewed     Procedure Component Value Units Date/Time    FLU/RSV/COVID - if FLU/RSV clinically relevant [[id number]]  (Normal) Collected: 10/19/22 1137    Lab Status: Final result Specimen: Nares from Nose Updated: 10/19/22 1224     SARS-CoV-2 Negative     INFLUENZA A PCR Negative     INFLUENZA B PCR Negative     RSV PCR Negative    Narrative:      FOR PEDIATRIC PATIENTS - copy/paste COVID Guidelines URL to browser: [email or web address]  [email or web address]    SARS-CoV-2 assay is a Nucleic Acid Amplification assay intended for the  qualitative detection of nucleic acid from SARS-CoV-2 in nasopharyngeal  swabs  Results are for the presumptive identification of SARS-CoV-2 RNA  Positive results are indicative of infection with SARS-CoV-2, the virus  causing COVID-19, but do not rule out bacterial infection or co-infection  with other viruses  Laboratories within the [place] and its  territories are required to report all positive results to the appropriate  public health authorities   Negative results do not preclude SARS-CoV-2  infection and should not be used as the sole basis for treatment or other  patient management decisions  Negative results must be combined with  clinical observations, patient history, and epidemiological information  This test has not been FDA cleared or approved  This test has been authorized by FDA under an Emergency Use Authorization  (EUA)  This test is only authorized for the duration of time the  declaration that circumstances exist justifying the authorization of the  emergency use of an in vitro diagnostic tests for detection of SARS-CoV-2  virus and/or diagnosis of COVID-19 infection under section 564(b)(1) of  the Act, 21 U  S C  582QGC-6(V)(6), unless the authorization is terminated  or revoked sooner  The test has been validated but independent review by FDA  and CLIA is pending  Test performed using [employer]: This RT-PCR assay targets N2,  a region unique to SARS-CoV-2  A conserved region in the E-gene was chosen  for pan-Sarbecovirus detection which includes SARS-CoV-2  According to CMS-2020-01-R, this platform meets the definition of high-throughput technology  No orders to display              Procedures  Procedures         ED Course                               SBIRT 20yo+    Flowsheet Row Most Recent Value   SBIRT (25 yo +)    In order to provide better care to our patients, we are screening all of our patients for alcohol and drug use  Would it be okay to ask you these screening questions? Yes Filed at: 10/19/2022 1142   Initial Alcohol Screen: US AUDIT-C     1  How often do you have a drink containing alcohol? 0 Filed at: 10/19/2022 1142   2  How many drinks containing alcohol do you have on a typical day you are drinking? 0 Filed at: 10/19/2022 1142   3a  Male UNDER 65: How often do you have five or more drinks on one occasion? 0 Filed at: 10/19/2022 1142   3b  FEMALE Any Age, or MALE 65+: How often do you have 4 or more drinks on one occassion?  0 Filed at: 10/19/2022 1142   Audit-C Score 0 Filed at: 10/19/2022 1142   RORO: How many times in the past year have you    Used an illegal drug or used a prescription medication for non-medical reasons? Never Filed at: 10/19/2022 1142                    [facility]  Number of Diagnoses or Management Options  Nasal congestion: new and requires workup  Seasonal allergies: new and requires workup  Sore throat: new and requires workup  Diagnosis management comments: 81-year-old female who presents for evaluation of sore throat and nasal congestion  Normal vital signs, benign exam   Treat symptomatically with Tylenol, Motrin, and Zyrtec  Will send COVID-19 testing as well  Advised follow up with PCP  Return precautions discussed  Amount and/or Complexity of Data Reviewed  Clinical lab tests: ordered  Review and summarize past medical records: yes    Risk of Complications, Morbidity, and/or Mortality  Presenting problems: moderate  Diagnostic procedures: minimal  Management options: low    Patient Progress  Patient progress: stable      Disposition  Final diagnoses:   Sore throat   Nasal congestion   Seasonal allergies     Time reflects when diagnosis was documented in both MDM as applicable and the Disposition within this note     Time User Action Codes Description Comment    10/19/2022 11:30 AM Kathy Charissa Add [J02 9] Sore throat     10/19/2022 11:30 AM Kathy Charissa Add [R09 81] Nasal congestion     10/19/2022 11:30 AM Kathy Charissa Add [J30 2] Seasonal allergies       ED Disposition     ED Disposition   Discharge    Condition   Stable    Date/Time   Wed Oct 19, 2022 11:30 AM    Comment   [facility] discharge to home/self care                 Follow-up Information     Follow up With Specialties Details Why Johnny 150, DO Family Medicine In 2 days  Pending sale to [facility] 2 [id number] [phone]            Discharge Medication List as of 10/19/2022 11:32 AM      START taking these medications    Details   cetirizine (ZyrTEC) 10 mg tablet Take 1 tablet (10 mg total) by mouth daily for 14 days, Starting Wed 10/19/2022, Until Wed 11/2/2022, Normal         CONTINUE these medications which have NOT CHANGED    Details   ibuprofen (MOTRIN) 600 mg tablet Take 1 tablet (600 mg total) by mouth every 6 (six) hours as needed for mild pain for up to 10 days, Starting Mon 10/19/2020, Until Thu 10/29/2020, Normal             No discharge procedures on file      PDMP Review       Value Time User    PDMP Reviewed  Yes 12/23/2019  1:35 PM Micaela Cortez, [facility]          ED Provider  Electronically Signed by           Jennifer Strong MD  10/19/22 0477

## 2022-11-01 ENCOUNTER — VIRTUAL VISIT (OUTPATIENT)
Dept: FAMILY MEDICINE CLINIC | Age: 87
End: 2022-11-01
Payer: MEDICARE

## 2022-11-01 DIAGNOSIS — J02.9 PHARYNGITIS, UNSPECIFIED ETIOLOGY: Primary | ICD-10-CM

## 2022-11-01 LAB
S PYO AG THROAT QL: NEGATIVE
VALID INTERNAL CONTROL?: YES

## 2022-11-01 PROCEDURE — G2025 DIS SITE TELE SVCS RHC/FQHC: HCPCS | Performed by: NURSE PRACTITIONER

## 2022-11-01 PROCEDURE — 87880 STREP A ASSAY W/OPTIC: CPT | Performed by: NURSE PRACTITIONER

## 2022-11-01 RX ORDER — PREDNISONE 10 MG/1
TABLET ORAL
Qty: 6 TABLET | Refills: 0 | Status: SHIPPED | OUTPATIENT
Start: 2022-11-01

## 2022-11-01 NOTE — PROGRESS NOTES
Geneva Gomez is a 81 y.o. male evaluated via telephone on 11/1/2022. Consent:    Geneva Gomez was evaluated through a patient-initiated, synchronous (real-time) audio only encounter. He (or guardian if applicable) is aware that it is a billable service, which includes applicable co-pays, with coverage as determined by his insurance carrier. This visit was conducted with the patient's (and/or Pamela Douglas guardian's) verbal consent. He has not had a related appointment within my department in the past 7 days or scheduled within the next 24 hours. The patient was located in a state where the provider was licensed to provide care. The patient was located at: Home: [address] [address]  The provider was located at: [facility] [address] [address]      Documentation:  I communicated with the patient and/or health care decision maker about cold symptoms. The patient reports cough and congestion that started last week. This has improved but he has a severe sore throat with painful swallowing for the past few days. Denies any sick contacts. Afebrile. Details of this discussion including any medical advice provided: Come on in for a strep test to help guide treatment. Strep test was negative. Suspect viral process. Rx prednisone. I affirm this is a Patient Initiated Episode with an Established Patient who has not had a related appointment within my department in the past 7 days or scheduled within the next 24 hours. ASSESSMENT AND PLAN:       ICD-10-CM ICD-9-CM    1. Pharyngitis, unspecified etiology  J02.9 462 AMB POC RAPID STREP A          Total Time: minutes: 11-20 minutes    Patient aware of plan of care and verbalized understanding. Questions answered. RTC PRN.     Josh Magallanes NP

## 2022-11-15 DIAGNOSIS — M25.511 CHRONIC PAIN OF BOTH SHOULDERS: ICD-10-CM

## 2022-11-15 DIAGNOSIS — M25.512 CHRONIC PAIN OF BOTH SHOULDERS: ICD-10-CM

## 2022-11-15 DIAGNOSIS — G89.29 CHRONIC PAIN OF BOTH SHOULDERS: ICD-10-CM

## 2022-11-15 RX ORDER — GUAIFENESIN 100 MG/5ML
LIQUID (ML) ORAL
Qty: 90 TABLET | Refills: 1 | Status: SHIPPED | OUTPATIENT
Start: 2022-11-15

## 2023-02-17 ENCOUNTER — OFFICE VISIT (OUTPATIENT)
Dept: FAMILY MEDICINE CLINIC | Age: 88
End: 2023-02-17
Payer: MEDICARE

## 2023-02-17 VITALS
SYSTOLIC BLOOD PRESSURE: 136 MMHG | HEART RATE: 80 BPM | HEIGHT: 67 IN | WEIGHT: 169.8 LBS | DIASTOLIC BLOOD PRESSURE: 72 MMHG | OXYGEN SATURATION: 96 % | BODY MASS INDEX: 26.65 KG/M2 | TEMPERATURE: 97.9 F | RESPIRATION RATE: 16 BRPM

## 2023-02-17 DIAGNOSIS — M25.512 ACUTE PAIN OF BOTH SHOULDERS: ICD-10-CM

## 2023-02-17 DIAGNOSIS — M25.511 ACUTE PAIN OF BOTH SHOULDERS: ICD-10-CM

## 2023-02-17 DIAGNOSIS — N18.30 STAGE 3 CHRONIC KIDNEY DISEASE, UNSPECIFIED WHETHER STAGE 3A OR 3B CKD (HCC): ICD-10-CM

## 2023-02-17 DIAGNOSIS — Z00.00 MEDICARE ANNUAL WELLNESS VISIT, SUBSEQUENT: Primary | ICD-10-CM

## 2023-02-17 RX ORDER — MELOXICAM 7.5 MG/1
7.5 TABLET ORAL DAILY
Qty: 14 TABLET | Refills: 0 | Status: SHIPPED | OUTPATIENT
Start: 2023-02-17

## 2023-02-17 NOTE — PROGRESS NOTES
Chief Complaint   Patient presents with    Annual Wellness Visit       HPI:    Kaushik Neville is a 81 y.o. male in today for for an acute visit and AWV. Hx Graves disease, back on methimazole. Upcoming endocrinologist appt in June. HTN, compliant with medications. No CP, HA, visual changes. HLD: Compliant with statin therapy. Follows with PCP in [address] yearly in the fall. CKD: Stable, Cr 1.39. Had labs checked by PCP. New issues: In today with a CC of bilateral shoulder pain x 2 weeks, no injuries. Currently taking Tylenol. L shoulder is worse than R. 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 No Known Allergies    Current Outpatient Medications   Medication Sig    meloxicam (MOBIC) 7.5 mg tablet Take 1 Tablet by mouth daily. Arthritis Pain Relief 650 mg TbER TAKE 1 TABLET BY MOUTH EVERY EIGHT (8) HOURS.    predniSONE (DELTASONE) 10 mg tablet 2 tabs PO QAM x 2 days, then 1 tab PO QAM x 2 days    methIMAzole (TAPAZOLE) 5 mg tablet One tablet by mouth once a day    hydroCHLOROthiazide (HYDRODIURIL) 25 mg tablet Take 1 Tablet by mouth daily. peg 400-propylene glycol (SYSTANE, PROPYLENE GLYCOL,) 0.4-0.3 % drop Apply 1 Drop to eye. Several times per day    VIT A/VIT C/VIT E/ZINC/COPPER (PRESERVISION AREDS PO) Take  by mouth two (2) times a day. irbesartan (AVAPRO) 300 mg tablet TAKE 1 TABLET BY MOUTH EVERY DAY FOR BLOOD PRESSURE    cholecalciferol, VITAMIN D3, (VITAMIN D3) 5,000 unit tab tablet Take  by mouth two (2) times a day. B.infantis-B.ani-B.long-B.bifi 10-15 mg TbEC Take  by mouth daily. 1 tab every other day    amLODIPine (NORVASC) 10 mg tablet Take  by mouth daily. atorvastatin (LIPITOR) 10 mg tablet Take  by mouth daily. No current facility-administered medications for this visit.        Past Medical History:   Diagnosis Date    Acute gouty arthropathy     Benign hypertensive heart disease without heart failure     Hypertrophy of prostate without urinary obstruction and other lower urinary tract symptoms (LUTS)     Memory difficulties     Other and unspecified hyperlipidemia     Subclinical hyperthyroidism        Family History   Problem Relation Age of Onset    Hypertension Mother     Stroke Mother     Hypertension Father     Stroke Father     Breast Cancer Sister        ROS:  Denies fever, chills, cough, chest pain, SOB,  nausea, vomiting, diarrhea, dysuria. Denies rashes, wounds, + arthralgias, weakness, numbness, visual changes, depression, + wt loss, wt gain, hemoptysis, hematochezia or melena. Patient is not experiencing chest pain radiating to the jaw and/or down the arms. Physical Examination:    /72 (BP 1 Location: Right arm, BP Patient Position: Sitting, BP Cuff Size: Adult long)   Pulse 80   Temp 97.9 °F (36.6 °C) (Oral)   Resp 16   Ht 5' 7\" (1.702 m)   Wt 169 lb 12.8 oz (77 kg)   SpO2 96%   BMI 26.59 kg/m²     Wt Readings from Last 3 Encounters:   02/17/23 169 lb 12.8 oz (77 kg)   06/24/22 175 lb (79.4 kg)   05/23/22 177 lb 9.6 oz (80.6 kg)     Constitutional: WDWN Male in no acute distress  HENT:  NC/AT  EYES: EOMI, PERRL  Neck:  Supple, no JVD, mass or bruit. No thyromegaly. Respiratory:  Respirations even and unlabored without use of accessory muscles, CTA throughout without wheezes, rales, rubs or rhonchi. Symmetrical chest expansion. Cardiac: RRR  Musculoskeletal:  No cyanosis, clubbing or edema of extremities. Moves all extremities without difficulty. + empty can on L. Reduced ROM of bilateral shoulders due to pain, joints are stiff. Neurologic:  Smooth, even gait without assistance, CN 2-12 grossly intact. Skin: intact and warm to the touch, no rash   Lymphadenopathy: no cervical or supraclavicular nodes  Psych: Pleasant and appropriate. Judgment normal. Alert and oriented x 3. ASSESSMENT AND PLAN:       ICD-10-CM ICD-9-CM    1. Medicare annual wellness visit, subsequent  Z00.00 V70.0       2.  Stage 3 chronic kidney disease, unspecified whether stage 3a or 3b CKD (HCC)  N18.30 585.3       3. Acute pain of both shoulders  M25.511 719.41 meloxicam (MOBIC) 7.5 mg tablet    M25.512          See attached AWV. 2 week course of meloxicam. If no improvement, RTC for possible joint injection(s) and PT referral.    Medication Side Effects and Warnings were discussed with patient:  yes  Patient Labs were reviewed:  yes  Patient Past Records were reviewed:  yes    Patient aware of plan of care and verbalized understanding. Questions answered. RTC PRN.     Debora Baca NP

## 2023-02-17 NOTE — PROGRESS NOTES
YES Answers must have Comments  1. \"Have you been to the ER, urgent care clinic since your last visit? Hospitalized since your last visit? \"    [] YES   [x] NO       2. Have you seen or consulted any other health care providers outside of [address] [place] [address] since your last visit?     [] YES   [x] NO       3. For patients aged 39-70: Have you had a colorectal cancer screening such as a colonoscopy/FIT/Cologuard? Nurse/CMA to request records if not in chart   [] YES   [] NO   [x] NA, based on age    If the patient is female:      4. For female patients aged 41-77: Star Anne you had a mammogram in the last two years?  Nurse/CMA to request records if not in chart   [] YES   [] NO   [x] NA, based on age    11. For female patients aged 21-65: Star Anne you had a pap smear?   Nurse/CMA to request records if not in chart   [] YES   [] NO  [x] NA, based on age

## 2023-02-17 NOTE — PROGRESS NOTES
This is the Subsequent Medicare Annual Wellness Exam, performed 12 months or more after the Initial AWV or the last Subsequent AWV    I have reviewed the patient's medical history in detail and updated the computerized patient record. Assessment/Plan   Education and counseling provided:  Are appropriate based on today's review and evaluation    1. Medicare annual wellness visit, subsequent  2. Stage 3 chronic kidney disease, unspecified whether stage 3a or 3b CKD ([facility] [address]  3. Acute pain of both shoulders  -     meloxicam (MOBIC) 7.5 mg tablet; Take 1 Tablet by mouth daily. , Normal, Disp-14 Tablet, R-0     Depression Risk Factor Screening:     3 most recent PHQ Screens 2/17/2023   Little interest or pleasure in doing things Not at all   Feeling down, depressed, irritable, or hopeless Not at all   Total Score PHQ 2 0       Alcohol & Drug Abuse Risk Screen    Do you average more than 1 drink per night or more than 7 drinks a week: Yes    In the past three months have you have had more than 4 drinks containing alcohol on one occasion: No          Functional Ability and Level of Safety:    Hearing: The patient wears hearing aids. Activities of Daily Living: The home contains: no safety equipment. Patient does total self care      Ambulation: with no difficulty     Fall Risk:  Fall Risk Assessment, last 12 mths 2/17/2023   Able to walk? Yes   Fall in past 12 months? 0   Do you feel unsteady? 0   Are you worried about falling 0   Is TUG test greater than 12 seconds? -   Is the gait abnormal? -   Number of falls in past 12 months -   Fall with injury?  -      Abuse Screen:  Patient is not abused       Cognitive Screening    Has your family/caregiver stated any concerns about your memory: no    Cognitive Screening: Normal - MMSE (Mini Mental Status Exam)    Health Maintenance Due     Health Maintenance Due   Topic Date Due    Shingles Vaccine (1 of 2) Never done    Pneumococcal 65+ years (1 - PCV) Never done COVID-19 Vaccine (4 - Booster for Zelaya Peter series) 02/08/2022    Flu Vaccine (1) 08/01/2022    Medicare Yearly Exam  02/26/2023       Patient Care Team   Patient Care Team:  Valentino Dennis NP as PCP - General (Nurse Practitioner)  Valentino Dennis NP as PCP [facility]    History     Patient Active Problem List   Diagnosis Code    Mixed hyperlipidemia E78.2    Benign hypertensive heart disease without heart failure I11.9    Benign non-nodular prostatic hyperplasia without lower urinary tract symptoms N40.0    Lipoma of joint D17.9    Memory difficulties R41.3    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 09X0    Graves disease E05.00    Chronic renal disease, stage III N18.30     Past Medical History:   Diagnosis Date    Acute gouty arthropathy     Benign hypertensive heart disease without heart failure     Hypertrophy of prostate without urinary obstruction and other lower urinary tract symptoms (LUTS)     Memory difficulties     Other and unspecified hyperlipidemia     Subclinical hyperthyroidism       Past Surgical History:   Procedure Laterality Date    HX ANKLE FRACTURE TX  05/2020    lawn tractor accident    [address]  01/01/2008    Rt    HX COLONOSCOPY  7/15    [place], q 10 yrs    HX HERNIA REPAIR  01/01/1980     Current Outpatient Medications   Medication Sig Dispense Refill    meloxicam (MOBIC) 7.5 mg tablet Take 1 Tablet by mouth daily. 14 Tablet 0    Arthritis Pain Relief 650 mg TbER TAKE 1 TABLET BY MOUTH EVERY EIGHT (8) HOURS. 90 Tablet 1    predniSONE (DELTASONE) 10 mg tablet 2 tabs PO QAM x 2 days, then 1 tab PO QAM x 2 days 6 Tablet 0    methIMAzole (TAPAZOLE) 5 mg tablet One tablet by mouth once a day 90 Tablet 3    hydroCHLOROthiazide (HYDRODIURIL) 25 mg tablet Take 1 Tablet by mouth daily. 30 Tablet 0    peg 400-propylene glycol (SYSTANE, PROPYLENE GLYCOL,) 0.4-0.3 % drop Apply 1 Drop to eye.  Several times per day      VIT A/VIT C/VIT E/ZINC/COPPER (PRESERVISION AREDS PO) Take  by mouth two (2) times a day. irbesartan (AVAPRO) 300 mg tablet TAKE 1 TABLET BY MOUTH EVERY DAY FOR BLOOD PRESSURE  3    cholecalciferol, VITAMIN D3, (VITAMIN D3) 5,000 unit tab tablet Take  by mouth two (2) times a day. B.infantis-B.ani-B.long-B.bifi 10-15 mg TbEC Take  by mouth daily. 1 tab every other day      amLODIPine (NORVASC) 10 mg tablet Take  by mouth daily. atorvastatin (LIPITOR) 10 mg tablet Take  by mouth daily. No Known Allergies    Family History   Problem Relation Age of Onset    Hypertension Mother     Stroke Mother     Hypertension Father     Stroke Father     Breast Cancer Sister      Social History     Tobacco Use    Smoking status: Never    Smokeless tobacco: Never   Substance Use Topics    Alcohol use:  Yes     Alcohol/week: 1.7 standard drinks     Types: 2 Standard drinks or equivalent per week     Comment: social            Viveca Hobby

## 2023-02-24 ENCOUNTER — TELEPHONE (OUTPATIENT)
Dept: FAMILY MEDICINE CLINIC | Age: 88
End: 2023-02-24

## 2023-02-24 NOTE — TELEPHONE ENCOUNTER
Pt states that he took 1 pill of meloxicam and he has not had any pain in his shoulder since. Wanted to let Nicole Rajput know.

## 2023-05-21 RX ORDER — AMLODIPINE BESYLATE 10 MG/1
TABLET ORAL DAILY
COMMUNITY

## 2023-05-21 RX ORDER — HYDROCHLOROTHIAZIDE 25 MG/1
25 TABLET ORAL DAILY
COMMUNITY
Start: 2022-02-25

## 2023-05-21 RX ORDER — ATORVASTATIN CALCIUM 10 MG/1
TABLET, FILM COATED ORAL DAILY
COMMUNITY

## 2023-05-21 RX ORDER — METHIMAZOLE 5 MG/1
TABLET ORAL
COMMUNITY
Start: 2022-06-24

## 2023-05-21 RX ORDER — PREDNISONE 10 MG/1
TABLET ORAL
COMMUNITY
Start: 2022-11-01

## 2023-05-21 RX ORDER — IRBESARTAN 300 MG/1
TABLET ORAL
COMMUNITY
Start: 2017-09-11

## 2023-05-21 RX ORDER — SENNOSIDES 8.6 MG
CAPSULE ORAL
COMMUNITY
Start: 2022-11-15

## 2023-05-21 RX ORDER — MELOXICAM 7.5 MG/1
7.5 TABLET ORAL DAILY
COMMUNITY
Start: 2023-02-17

## 2023-08-04 DIAGNOSIS — E05.90 THYROTOXICOSIS, UNSPECIFIED WITHOUT THYROTOXIC CRISIS OR STORM: ICD-10-CM

## 2023-08-05 RX ORDER — METHIMAZOLE 5 MG/1
TABLET ORAL
Qty: 90 TABLET | Refills: 3 | Status: SHIPPED | OUTPATIENT
Start: 2023-08-05

## 2023-08-11 ENCOUNTER — NURSE TRIAGE (OUTPATIENT)
Dept: OTHER | Facility: CLINIC | Age: 88
End: 2023-08-11

## 2023-08-11 NOTE — TELEPHONE ENCOUNTER
Location of patient: [address] [facility] [place] call from Fabricio at [facility] with [employer]. Subjective: Caller states \"Fatigue and not feeling well for 2 days\"     Current Symptoms: \"a bit shaky when walking\" Wanting to sleep more- Wants to be seen in the office    Onset: 2 days ago; gradual    Associated Symptoms: increased sleepiness    Pain Severity: 0/10; N/A; none    Temperature: no fevers     What has been tried: tylenol and motrin    LMP: NA Pregnant: NA    Recommended disposition: See in Office Today    Care advice provided, patient verbalizes understanding; denies any other questions or concerns; instructed to call back for any new or worsening symptoms. Patient/Caller agrees with recommended disposition; writer provided warm transfer to Kapil Monson at [facility] for appointment scheduling    Attention Provider: Thank you for allowing me to participate in the care of your patient. The patient was connected to triage in response to information provided to the ECC/PSC. Please do not respond through this encounter as the response is not directed to a shared pool.         Reason for Disposition   Patient wants to be seen    Protocols used: Weakness (Generalized) and Fatigue-ADULT-OH No

## 2023-08-11 NOTE — TELEPHONE ENCOUNTER
Medication Refill Request    Kobe Werner is requesting a refill of the following medication(s):   atorvastatin (LIPITOR) 10 MG tablet  Please send refill to:     [facility]/pharmacy [address] [facility], [address] [place] [address] Raisa Aguero, [address] [place] [address] Avenue [phone] Skip Dimple [phone]  [address] [address]  Phone: [phone] Fax: [phone]

## 2023-08-12 RX ORDER — ATORVASTATIN CALCIUM 10 MG/1
10 TABLET, FILM COATED ORAL DAILY
Qty: 90 TABLET | Refills: 3 | Status: SHIPPED | OUTPATIENT
Start: 2023-08-12

## 2023-09-20 NOTE — PROGRESS NOTES
Chief Complaint   Patient presents with    Thyroid Problem     * Since last visit - 6/24/2022 OVER-DUE     No new medical issues   On methimazole  5mg once a day for subclinical hyperthyroidism  No new issues except feels less stable   Sj Gee here a couple hours ago     General:  From initial visit: 6/24/2022    On methimazole since 2016  Did go off the medication at one point and put back on it (2 per day for 10 days)  Tried 1/2 tab and told to go back to full tablet    Thyroid Symptoms  denies change in energy, denies change in weight, denies change in appetite, denies sleep issues, denies hair changes, no skin changes, denies sweats, denies hot/cold intolerance, denies tremor, denies palpitations, denies change in bowels, no change in menses, denies mood changes    Neck Symptoms  denies dysphagia, denies anterior neck pain, denies neck swelling, notes no nodules    Labs/Studies      Lab Results   Component Value Date/Time    TSH 2.59 09/01/2022 10:59 AM    TSH 2.520 06/24/2022 11:58 AM    TSH 1.55 05/23/2022 10:00 AM    T4FREE 0.7 09/01/2022 10:59 AM    T4FREE 0.8 05/23/2022 10:00 AM    T4FREE 1.0 02/25/2022 03:46 PM    FT3 3.0 09/01/2022 10:59 AM    FT3 2.7 10/21/2020 09:42 AM         Lab Results   Component Value Date/Time    TSH 2.59 09/01/2022 10:59 AM    TSH 2.520 06/24/2022 11:58 AM    TSH 1.55 05/23/2022 10:00 AM    TSH 0.29 02/25/2022 03:46 PM    TSH 2.060 03/22/2021 12:00 AM    TSH 3.55 10/21/2020 09:42 AM    TSH 0.016 12/02/2019 04:22 PM    TSH 0.01 09/30/2019 09:15 AM       TSH 0.601 04/11/2019 11:49 AM    TSH 1.400 01/04/2019 12:22 PM    TSH 1.590 08/24/2018 03:36 PM    TSH 2.860 01/24/2018 11:23 AM    TSH 2.460 05/26/2017 11:06 AM    TSH 1.49 04/06/2017 09:34 AM    TSH 1.900 02/27/2017 04:29 PM    TSH 0.092 (L) 12/30/2016 11:32 AM    TSH 0.149 (L) 11/03/2016 04:01 PM    TSH 0.059 (L) 10/04/2016 01:26 PM    TSH 0.250 (L) 06/14/2016 09:55 AM    TSH 0.156 (L) 06/01/2016 03:14 PM      Past Medical History:

## 2023-09-21 ENCOUNTER — OFFICE VISIT (OUTPATIENT)
Age: 88
End: 2023-09-21
Payer: COMMERCIAL

## 2023-09-21 VITALS — WEIGHT: 171 LBS | BODY MASS INDEX: 26.84 KG/M2 | HEIGHT: 67 IN

## 2023-09-21 DIAGNOSIS — E05.90 SUBCLINICAL HYPERTHYROIDISM: Primary | ICD-10-CM

## 2023-09-21 PROCEDURE — 1123F ACP DISCUSS/DSCN MKR DOCD: CPT | Performed by: INTERNAL MEDICINE

## 2023-09-21 PROCEDURE — 99203 OFFICE O/P NEW LOW 30 MIN: CPT | Performed by: INTERNAL MEDICINE

## 2023-09-21 RX ORDER — METHIMAZOLE 5 MG/1
5 TABLET ORAL DAILY
Qty: 90 TABLET | Refills: 3 | Status: SHIPPED | OUTPATIENT
Start: 2023-09-21

## 2023-09-21 RX ORDER — ANTIOX #8/OM3/DHA/EPA/LUT/ZEAX 250-2.5 MG
1 CAPSULE ORAL
COMMUNITY

## 2023-09-26 NOTE — PROGRESS NOTES
5/6/2021  P. T. EDUCATED PATIENT RE SAFETY WITH GAIT, MOBILITY AND BED MOBILITY. PATIENT VERBALIZED GOOD UNDERSTANDING. PATIENT ADVISED TO USE RW, NOT CTUTCHES, WHICH WAS PRESENT IN  PATIENT'S RM. PATIENT DC TO HOME AGAINST ORIGINAL ADVISE. I [facility] tell her TSH normal labs normal.

## 2023-10-31 ENCOUNTER — OFFICE VISIT (OUTPATIENT)
Age: 88
End: 2023-10-31
Payer: MEDICARE

## 2023-10-31 VITALS
HEIGHT: 67 IN | DIASTOLIC BLOOD PRESSURE: 58 MMHG | TEMPERATURE: 97.8 F | RESPIRATION RATE: 16 BRPM | WEIGHT: 165.8 LBS | OXYGEN SATURATION: 97 % | BODY MASS INDEX: 26.02 KG/M2 | HEART RATE: 62 BPM | SYSTOLIC BLOOD PRESSURE: 120 MMHG

## 2023-10-31 DIAGNOSIS — N18.30 STAGE 3 CHRONIC KIDNEY DISEASE, UNSPECIFIED WHETHER STAGE 3A OR 3B CKD (HCC): ICD-10-CM

## 2023-10-31 DIAGNOSIS — Z91.81 AT HIGH RISK FOR FALLS: ICD-10-CM

## 2023-10-31 DIAGNOSIS — E78.2 MIXED HYPERLIPIDEMIA: ICD-10-CM

## 2023-10-31 DIAGNOSIS — I95.2 HYPOTENSION DUE TO DRUGS: Primary | ICD-10-CM

## 2023-10-31 DIAGNOSIS — E05.00 GRAVES DISEASE: ICD-10-CM

## 2023-10-31 DIAGNOSIS — I10 ESSENTIAL (PRIMARY) HYPERTENSION: ICD-10-CM

## 2023-10-31 PROCEDURE — G8427 DOCREV CUR MEDS BY ELIG CLIN: HCPCS | Performed by: NURSE PRACTITIONER

## 2023-10-31 PROCEDURE — 99214 OFFICE O/P EST MOD 30 MIN: CPT | Performed by: NURSE PRACTITIONER

## 2023-10-31 PROCEDURE — 36415 COLL VENOUS BLD VENIPUNCTURE: CPT | Performed by: NURSE PRACTITIONER

## 2023-10-31 PROCEDURE — G8419 CALC BMI OUT NRM PARAM NOF/U: HCPCS | Performed by: NURSE PRACTITIONER

## 2023-10-31 PROCEDURE — G8484 FLU IMMUNIZE NO ADMIN: HCPCS | Performed by: NURSE PRACTITIONER

## 2023-10-31 PROCEDURE — 1036F TOBACCO NON-USER: CPT | Performed by: NURSE PRACTITIONER

## 2023-10-31 PROCEDURE — 1123F ACP DISCUSS/DSCN MKR DOCD: CPT | Performed by: NURSE PRACTITIONER

## 2023-10-31 RX ORDER — IRBESARTAN 150 MG/1
150 TABLET ORAL DAILY
Qty: 30 TABLET | Refills: 0 | Status: SHIPPED | OUTPATIENT
Start: 2023-10-31

## 2023-10-31 ASSESSMENT — PATIENT HEALTH QUESTIONNAIRE - PHQ9
SUM OF ALL RESPONSES TO PHQ QUESTIONS 1-9: 0
SUM OF ALL RESPONSES TO PHQ9 QUESTIONS 1 & 2: 0
SUM OF ALL RESPONSES TO PHQ QUESTIONS 1-9: 0
SUM OF ALL RESPONSES TO PHQ QUESTIONS 1-9: 0
1. LITTLE INTEREST OR PLEASURE IN DOING THINGS: 0
SUM OF ALL RESPONSES TO PHQ QUESTIONS 1-9: 0
2. FEELING DOWN, DEPRESSED OR HOPELESS: 0

## 2023-10-31 NOTE — PATIENT INSTRUCTIONS
Cut your blood pressure pill in half. I'll send in a new Rx for irbesartan 150 mg to [facility].   I am putting in a referral to physical therapy at [facility]. You can call them to schedule it. I think the ringing in your ears is from hearing loss. We will check your blood work today. I will see you back in 2-3 weeks.

## 2023-10-31 NOTE — PROGRESS NOTES
Chief Complaint   Patient presents with    Tinnitus       ASSESSMENT AND PLAN:      Diagnosis Orders   1. Hypotension due to drugs  irbesartan (AVAPRO) 150 MG tablet      2. Stage 3 chronic kidney disease, unspecified whether stage 3a or 3b CKD ([address])        3. Graves disease  TSH    T4, Free    T4, Free    TSH      4. Mixed hyperlipidemia        5. Essential (primary) hypertension  [place] COLLECTION VENOUS BLOOD VENIPUNCTURE    CBC with Auto Differential    Comprehensive Metabolic Panel    Lipid Panel    Lipid Panel    Comprehensive Metabolic Panel    CBC with Auto Differential      6. At high risk for falls  External Referral To Physical Therapy            Cut your blood pressure pill in half. I'll send in a new Rx for irbesartan 150 mg to [facility].   I am putting in a referral to physical therapy at [facility]. You can call them to schedule it. I think the ringing in your ears is from hearing loss. We will check your blood work today. I will see you back in 2-3 weeks. Patient aware of plan of care and verbalized understanding. Questions answered. RTC 2-3 weeks, or sooner if needed. HPI:    Ernie Garrett is a 81 y.o. male in today for an acute visit. Hx Graves disease, back on methimazole 5 mg, followed by endocrinology. HTN, compliant with medications. No CP, HA, visual changes. HLD: Compliant with statin therapy. Follows with PCP in [facility] yearly in the fall. CKD: Stable, Cr 1.39. New issues: He reports fatigue, frequent falls, and ringing in his ears. Symptoms started 1 month ago. He feels unsteady, off balance. He denies headache, visual changes, chest pain. He continues to drive and live independently. He does not monitor his BP at home. No Known Allergies    Prior to Visit Medications    Medication Sig Taking?  Authorizing Provider   irbesartan (AVAPRO) 150 MG tablet Take 1 tablet by mouth daily for blood pressure Yes Sarah Ness, APRN - NP   Multiple Vitamins-Minerals

## 2023-11-01 DIAGNOSIS — E05.90 SUBCLINICAL HYPERTHYROIDISM: ICD-10-CM

## 2023-11-01 LAB
ALBUMIN SERPL-MCNC: 3.7 G/DL (ref 3.5–5)
ALBUMIN/GLOB SERPL: 1.2 (ref 1.1–2.2)
ALP SERPL-CCNC: 87 U/L (ref 45–117)
ALT SERPL-CCNC: 21 U/L (ref 12–78)
ANION GAP SERPL CALC-SCNC: 5 MMOL/L (ref 5–15)
AST SERPL-CCNC: 17 U/L (ref 15–37)
BASOPHILS # BLD: 0 K/UL (ref 0–0.1)
BASOPHILS NFR BLD: 1 % (ref 0–1)
BILIRUB SERPL-MCNC: 0.8 MG/DL (ref 0.2–1)
BUN SERPL-MCNC: 28 MG/DL (ref 6–20)
BUN/CREAT SERPL: 22 (ref 12–20)
CALCIUM SERPL-MCNC: 9.1 MG/DL (ref 8.5–10.1)
CHLORIDE SERPL-SCNC: 107 MMOL/L (ref 97–108)
CHOLEST SERPL-MCNC: 157 MG/DL
CO2 SERPL-SCNC: 29 MMOL/L (ref 21–32)
CREAT SERPL-MCNC: 1.3 MG/DL (ref 0.7–1.3)
DIFFERENTIAL METHOD BLD: ABNORMAL
EOSINOPHIL # BLD: 0.1 K/UL (ref 0–0.4)
EOSINOPHIL NFR BLD: 2 % (ref 0–7)
ERYTHROCYTE [DISTWIDTH] IN BLOOD BY AUTOMATED COUNT: 13 % (ref 11.5–14.5)
GLOBULIN SER CALC-MCNC: 3 G/DL (ref 2–4)
GLUCOSE SERPL-MCNC: 113 MG/DL (ref 65–100)
HCT VFR BLD AUTO: 43.2 % (ref 36.6–50.3)
HDLC SERPL-MCNC: 47 MG/DL
HDLC SERPL: 3.3 (ref 0–5)
HGB BLD-MCNC: 13.8 G/DL (ref 12.1–17)
IMM GRANULOCYTES # BLD AUTO: 0 K/UL (ref 0–0.04)
IMM GRANULOCYTES NFR BLD AUTO: 1 % (ref 0–0.5)
LDLC SERPL CALC-MCNC: 88 MG/DL (ref 0–100)
LYMPHOCYTES # BLD: 0.8 K/UL (ref 0.8–3.5)
LYMPHOCYTES NFR BLD: 19 % (ref 12–49)
MCH RBC QN AUTO: 31.7 PG (ref 26–34)
MCHC RBC AUTO-ENTMCNC: 31.9 G/DL (ref 30–36.5)
MCV RBC AUTO: 99.3 FL (ref 80–99)
MONOCYTES # BLD: 0.5 K/UL (ref 0–1)
MONOCYTES NFR BLD: 11 % (ref 5–13)
NEUTS SEG # BLD: 2.8 K/UL (ref 1.8–8)
NEUTS SEG NFR BLD: 66 % (ref 32–75)
NRBC # BLD: 0 K/UL (ref 0–0.01)
NRBC BLD-RTO: 0 PER 100 WBC
PLATELET # BLD AUTO: 232 K/UL (ref 150–400)
PMV BLD AUTO: 10 FL (ref 8.9–12.9)
POTASSIUM SERPL-SCNC: 4.3 MMOL/L (ref 3.5–5.1)
PROT SERPL-MCNC: 6.7 G/DL (ref 6.4–8.2)
RBC # BLD AUTO: 4.35 M/UL (ref 4.1–5.7)
SODIUM SERPL-SCNC: 141 MMOL/L (ref 136–145)
T4 FREE SERPL-MCNC: 1.1 NG/DL (ref 0.8–1.5)
TRIGL SERPL-MCNC: 110 MG/DL
TSH SERPL DL<=0.05 MIU/L-ACNC: 0.07 UIU/ML (ref 0.36–3.74)
VLDLC SERPL CALC-MCNC: 22 MG/DL
WBC # BLD AUTO: 4.2 K/UL (ref 4.1–11.1)

## 2023-11-01 RX ORDER — METHIMAZOLE 5 MG/1
TABLET ORAL
Qty: 90 TABLET | Refills: 3
Start: 2023-11-01

## 2023-11-21 ENCOUNTER — OFFICE VISIT (OUTPATIENT)
Age: 88
End: 2023-11-21
Payer: MEDICARE

## 2023-11-21 VITALS
DIASTOLIC BLOOD PRESSURE: 62 MMHG | RESPIRATION RATE: 16 BRPM | HEIGHT: 67 IN | WEIGHT: 168.6 LBS | HEART RATE: 78 BPM | OXYGEN SATURATION: 97 % | SYSTOLIC BLOOD PRESSURE: 110 MMHG | BODY MASS INDEX: 26.46 KG/M2 | TEMPERATURE: 98 F

## 2023-11-21 DIAGNOSIS — I20.89 ANGINA OF EFFORT: ICD-10-CM

## 2023-11-21 DIAGNOSIS — R42 DIZZINESS: ICD-10-CM

## 2023-11-21 DIAGNOSIS — E05.00 GRAVES DISEASE: ICD-10-CM

## 2023-11-21 DIAGNOSIS — I95.2 HYPOTENSION DUE TO DRUGS: Primary | ICD-10-CM

## 2023-11-21 PROCEDURE — 1123F ACP DISCUSS/DSCN MKR DOCD: CPT | Performed by: NURSE PRACTITIONER

## 2023-11-21 PROCEDURE — 93005 ELECTROCARDIOGRAM TRACING: CPT | Performed by: NURSE PRACTITIONER

## 2023-11-21 PROCEDURE — 93010 ELECTROCARDIOGRAM REPORT: CPT | Performed by: NURSE PRACTITIONER

## 2023-11-21 PROCEDURE — G8484 FLU IMMUNIZE NO ADMIN: HCPCS | Performed by: NURSE PRACTITIONER

## 2023-11-21 PROCEDURE — 99214 OFFICE O/P EST MOD 30 MIN: CPT | Performed by: NURSE PRACTITIONER

## 2023-11-21 PROCEDURE — G8419 CALC BMI OUT NRM PARAM NOF/U: HCPCS | Performed by: NURSE PRACTITIONER

## 2023-11-21 PROCEDURE — G8427 DOCREV CUR MEDS BY ELIG CLIN: HCPCS | Performed by: NURSE PRACTITIONER

## 2023-11-21 PROCEDURE — 1036F TOBACCO NON-USER: CPT | Performed by: NURSE PRACTITIONER

## 2023-11-21 SDOH — HEALTH STABILITY: PHYSICAL HEALTH: ON AVERAGE, HOW MANY DAYS PER WEEK DO YOU ENGAGE IN MODERATE TO STRENUOUS EXERCISE (LIKE A BRISK WALK)?: 0 DAYS

## 2023-11-21 SDOH — ECONOMIC STABILITY: TRANSPORTATION INSECURITY
IN THE PAST 12 MONTHS, HAS LACK OF TRANSPORTATION KEPT YOU FROM MEETINGS, WORK, OR FROM GETTING THINGS NEEDED FOR DAILY LIVING?: NO

## 2023-11-21 SDOH — ECONOMIC STABILITY: HOUSING INSECURITY
IN THE LAST 12 MONTHS, WAS THERE A TIME WHEN YOU DID NOT HAVE A STEADY PLACE TO SLEEP OR SLEPT IN A SHELTER (INCLUDING NOW)?: NO

## 2023-11-21 SDOH — ECONOMIC STABILITY: FOOD INSECURITY: WITHIN THE PAST 12 MONTHS, YOU WORRIED THAT YOUR FOOD WOULD RUN OUT BEFORE YOU GOT MONEY TO BUY MORE.: NEVER TRUE

## 2023-11-21 SDOH — ECONOMIC STABILITY: INCOME INSECURITY: IN THE LAST 12 MONTHS, WAS THERE A TIME WHEN YOU WERE NOT ABLE TO PAY THE MORTGAGE OR RENT ON TIME?: NO

## 2023-11-21 SDOH — HEALTH STABILITY: PHYSICAL HEALTH: ON AVERAGE, HOW MANY MINUTES DO YOU ENGAGE IN EXERCISE AT THIS LEVEL?: 0 MIN

## 2023-11-21 SDOH — ECONOMIC STABILITY: TRANSPORTATION INSECURITY
IN THE PAST 12 MONTHS, HAS THE LACK OF TRANSPORTATION KEPT YOU FROM MEDICAL APPOINTMENTS OR FROM GETTING MEDICATIONS?: NO

## 2023-11-21 SDOH — ECONOMIC STABILITY: FOOD INSECURITY: WITHIN THE PAST 12 MONTHS, THE FOOD YOU BOUGHT JUST DIDN'T LAST AND YOU DIDN'T HAVE MONEY TO GET MORE.: NEVER TRUE

## 2023-11-21 ASSESSMENT — SOCIAL DETERMINANTS OF HEALTH (SDOH)
IN A TYPICAL WEEK, HOW MANY TIMES DO YOU TALK ON THE PHONE WITH FAMILY, FRIENDS, OR NEIGHBORS?: MORE THAN THREE TIMES A WEEK
HOW OFTEN DO YOU ATTEND CHURCH OR RELIGIOUS SERVICES?: NEVER
WITHIN THE LAST YEAR, HAVE YOU BEEN KICKED, HIT, SLAPPED, OR OTHERWISE PHYSICALLY HURT BY YOUR PARTNER OR EX-PARTNER?: NO
WITHIN THE LAST YEAR, HAVE YOU BEEN AFRAID OF YOUR PARTNER OR EX-PARTNER?: NO
WITHIN THE LAST YEAR, HAVE YOU BEEN HUMILIATED OR EMOTIONALLY ABUSED IN OTHER WAYS BY YOUR PARTNER OR EX-PARTNER?: NO
WITHIN THE LAST YEAR, HAVE TO BEEN RAPED OR FORCED TO HAVE ANY KIND OF SEXUAL ACTIVITY BY YOUR PARTNER OR EX-PARTNER?: NO
HOW OFTEN DO YOU GET TOGETHER WITH FRIENDS OR RELATIVES?: MORE THAN THREE TIMES A WEEK
HOW HARD IS IT FOR YOU TO PAY FOR THE VERY BASICS LIKE FOOD, HOUSING, MEDICAL CARE, AND HEATING?: NOT HARD AT ALL
DO YOU BELONG TO ANY CLUBS OR ORGANIZATIONS SUCH AS CHURCH GROUPS UNIONS, FRATERNAL OR ATHLETIC GROUPS, OR SCHOOL GROUPS?: NO
HOW OFTEN DO YOU ATTENT MEETINGS OF THE CLUB OR ORGANIZATION YOU BELONG TO?: NEVER

## 2023-11-21 ASSESSMENT — PATIENT HEALTH QUESTIONNAIRE - PHQ9
SUM OF ALL RESPONSES TO PHQ QUESTIONS 1-9: 0
SUM OF ALL RESPONSES TO PHQ9 QUESTIONS 1 & 2: 0
2. FEELING DOWN, DEPRESSED OR HOPELESS: 0
1. LITTLE INTEREST OR PLEASURE IN DOING THINGS: 0

## 2023-11-21 ASSESSMENT — LIFESTYLE VARIABLES
HOW MANY STANDARD DRINKS CONTAINING ALCOHOL DO YOU HAVE ON A TYPICAL DAY: 1 OR 2
HOW OFTEN DO YOU HAVE A DRINK CONTAINING ALCOHOL: 2-3 TIMES A WEEK

## 2023-11-21 NOTE — PATIENT INSTRUCTIONS
Stop irbesartan for blood pressure. You don't seem to need it any more.   Start taking 2 of your methimazole for thyroid on Mondays and Fridays, and 1 all other days  Come back after [place] to make sure blood pressure is ok off of medicine and I'm going to re-draw your thyroid blood tests to see if they're back to normal.

## 2023-11-22 ENCOUNTER — TELEPHONE (OUTPATIENT)
Age: 88
End: 2023-11-22

## 2023-11-22 NOTE — TELEPHONE ENCOUNTER
Medication Refill Request    Puga Helisa is requesting a refill of the following medication(s):   irbesartan (AVAPRO) 150 MG tablet [[id number]]  DISCONTINUED    Received this order via fax [facility]     Please send refill to:     [facility]/pharmacy [address] Monisha Shook, [address] [place] [address] [phone] Brentwood Christianson [phone]  [address] [address]  Phone: [phone] Fax: [phone]

## 2023-11-27 ENCOUNTER — TELEPHONE (OUTPATIENT)
Age: 88
End: 2023-11-27

## 2023-11-27 NOTE — TELEPHONE ENCOUNTER
Medication Refill Request    [facility] is requesting a refill of the following medication(s):   irbesartan (AVAPRO) 150 MG tablet #90    Please send refill to:     [facility]/pharmacy [address] [place], [address] [phone] - F [phone]  [address]  [address]  Phone: [phone] Fax: [phone]

## 2024-01-03 DIAGNOSIS — I95.2 HYPOTENSION DUE TO DRUGS: ICD-10-CM

## 2024-01-05 ENCOUNTER — OFFICE VISIT (OUTPATIENT)
Age: 89
End: 2024-01-05
Payer: MEDICARE

## 2024-01-05 ENCOUNTER — HOSPITAL ENCOUNTER (OUTPATIENT)
Facility: HOSPITAL | Age: 89
End: 2024-01-05
Payer: MEDICARE

## 2024-01-05 VITALS
DIASTOLIC BLOOD PRESSURE: 80 MMHG | BODY MASS INDEX: 25.96 KG/M2 | TEMPERATURE: 97.8 F | HEIGHT: 67 IN | SYSTOLIC BLOOD PRESSURE: 136 MMHG | HEART RATE: 77 BPM | RESPIRATION RATE: 16 BRPM | WEIGHT: 165.4 LBS | OXYGEN SATURATION: 99 %

## 2024-01-05 DIAGNOSIS — M10.00 ACUTE IDIOPATHIC GOUT, UNSPECIFIED SITE: ICD-10-CM

## 2024-01-05 DIAGNOSIS — M79.89 FINGER SWELLING: ICD-10-CM

## 2024-01-05 DIAGNOSIS — M79.89 FINGER SWELLING: Primary | ICD-10-CM

## 2024-01-05 PROCEDURE — 99214 OFFICE O/P EST MOD 30 MIN: CPT | Performed by: NURSE PRACTITIONER

## 2024-01-05 PROCEDURE — G8427 DOCREV CUR MEDS BY ELIG CLIN: HCPCS | Performed by: NURSE PRACTITIONER

## 2024-01-05 PROCEDURE — 1036F TOBACCO NON-USER: CPT | Performed by: NURSE PRACTITIONER

## 2024-01-05 PROCEDURE — G8484 FLU IMMUNIZE NO ADMIN: HCPCS | Performed by: NURSE PRACTITIONER

## 2024-01-05 PROCEDURE — 73140 X-RAY EXAM OF FINGER(S): CPT

## 2024-01-05 PROCEDURE — 1123F ACP DISCUSS/DSCN MKR DOCD: CPT | Performed by: NURSE PRACTITIONER

## 2024-01-05 PROCEDURE — G8419 CALC BMI OUT NRM PARAM NOF/U: HCPCS | Performed by: NURSE PRACTITIONER

## 2024-01-05 RX ORDER — PREDNISONE 10 MG/1
10 TABLET ORAL DAILY
Qty: 7 TABLET | Refills: 0 | Status: SHIPPED | OUTPATIENT
Start: 2024-01-05 | End: 2024-01-12

## 2024-01-05 RX ORDER — IRBESARTAN 150 MG/1
150 TABLET ORAL DAILY
Qty: 30 TABLET | Refills: 0 | OUTPATIENT
Start: 2024-01-05

## 2024-01-05 ASSESSMENT — PATIENT HEALTH QUESTIONNAIRE - PHQ9
SUM OF ALL RESPONSES TO PHQ QUESTIONS 1-9: 0
SUM OF ALL RESPONSES TO PHQ9 QUESTIONS 1 & 2: 0
SUM OF ALL RESPONSES TO PHQ QUESTIONS 1-9: 0
1. LITTLE INTEREST OR PLEASURE IN DOING THINGS: 0
SUM OF ALL RESPONSES TO PHQ QUESTIONS 1-9: 0
SUM OF ALL RESPONSES TO PHQ QUESTIONS 1-9: 0
2. FEELING DOWN, DEPRESSED OR HOPELESS: 0

## 2024-01-05 NOTE — PROGRESS NOTES
\"Have you been to the ER, urgent care clinic since your last visit?  Hospitalized since your last visit?\"    NO    “Have you seen or consulted any other health care providers outside of [facility] since your last visit?”    NO

## 2024-01-05 NOTE — PROGRESS NOTES
Chief Complaint   Patient presents with    Finger Injury       ASSESSMENT AND PLAN:      Diagnosis Orders   1. Finger swelling  XR FINGER RIGHT (MIN 2 VIEWS)    predniSONE (DELTASONE) 10 MG tablet      2. Acute idiopathic gout, unspecified site            Suspicious for gout, start prednisone and send to [facility] for imaging.     Patient aware of plan of care and verbalized understanding. Questions answered. RTC PRN.    HPI:    Mr.Lentz Arce is a 88 y.o. male in today for an acute visit.    Hx Graves disease, back on methimazole 5 mg. TSH suppressed October 2023, medication increased to 10 mg Monday and Friday, 5 mg all other days. Next endo visit is in September 2024.    HTN, compliant with medications. No HA, visual changes. Irbesartan dose halved at last visit.    HLD: Compliant with statin therapy.     New issues: In today with a CC of R third digit swelling and pain x 3-4 days. He denies injury but reports, \"I forget a lot; I may have done something\". He hasn't been taking anything for the pain.     No Known Allergies    Prior to Visit Medications    Medication Sig Taking? Authorizing Provider   predniSONE (DELTASONE) 10 MG tablet Take 1 tablet by mouth daily for 7 days Yes Zoe Mane APRN - NP   methIMAzole (TAPAZOLE) 5 MG tablet 1 tab PO QD except 2 tab PO on Monday and Friday Yes Zoe Mane APRN - NP   Multiple Vitamins-Minerals (PRESERVISION AREDS 2) CAPS Take 1 capsule by mouth Yes Provider, MD Ross   atorvastatin (LIPITOR) 10 MG tablet Take 1 tablet by mouth daily Yes Zoe Mane APRN - NP   acetaminophen (TYLENOL) 650 MG extended release tablet TAKE 1 TABLET BY MOUTH EVERY EIGHT (8) HOURS. Yes Automatic Reconciliation, Ar   vitamin D3 (CHOLECALCIFEROL) 125 MCG (5000 UT) TABS tablet Take by mouth 2 times daily Yes Automatic Reconciliation, Ar   polyethyl glycol-propyl glycol 0.4-0.3 % (SYSTANE) 0.4-0.3 % ophthalmic solution Apply 1 drop to eye Yes Automatic Reconciliation,

## 2024-01-07 NOTE — RESULT ENCOUNTER NOTE
Xray shows findings consistent with gout. How is he feeling? If the pain and swelling persist, I want him to increase his prednisone to twice a day instead of once a day. Let me know and I'll fix the Rx.

## 2024-01-09 DIAGNOSIS — M79.89 FINGER SWELLING: ICD-10-CM

## 2024-01-09 RX ORDER — PREDNISONE 10 MG/1
10 TABLET ORAL 2 TIMES DAILY
Qty: 14 TABLET | Refills: 0 | Status: SHIPPED | OUTPATIENT
Start: 2024-01-09 | End: 2024-01-16

## 2024-04-17 ENCOUNTER — OFFICE VISIT (OUTPATIENT)
Age: 89
End: 2024-04-17

## 2024-04-17 VITALS
TEMPERATURE: 99 F | SYSTOLIC BLOOD PRESSURE: 154 MMHG | RESPIRATION RATE: 18 BRPM | DIASTOLIC BLOOD PRESSURE: 82 MMHG | HEIGHT: 67 IN | BODY MASS INDEX: 26.3 KG/M2 | WEIGHT: 167.6 LBS | OXYGEN SATURATION: 98 % | HEART RATE: 58 BPM

## 2024-04-17 DIAGNOSIS — H65.112 NON-RECURRENT ACUTE ALLERGIC OTITIS MEDIA OF LEFT EAR: ICD-10-CM

## 2024-04-17 DIAGNOSIS — Z00.00 MEDICARE ANNUAL WELLNESS VISIT, SUBSEQUENT: Primary | ICD-10-CM
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
## 2024-04-17 DIAGNOSIS — H93.13 TINNITUS OF BOTH EARS: ICD-10-CM

## 2024-04-17 DIAGNOSIS — S09.90XA TRAUMATIC INJURY OF HEAD, INITIAL ENCOUNTER: ICD-10-CM

## 2024-04-17 RX ORDER — ASPIRIN 81 MG/1
81 TABLET ORAL DAILY
COMMUNITY

## 2024-04-17 ASSESSMENT — PATIENT HEALTH QUESTIONNAIRE - PHQ9
SUM OF ALL RESPONSES TO PHQ QUESTIONS 1-9: 0
2. FEELING DOWN, DEPRESSED OR HOPELESS: NOT AT ALL
SUM OF ALL RESPONSES TO PHQ9 QUESTIONS 1 & 2: 0
1. LITTLE INTEREST OR PLEASURE IN DOING THINGS: NOT AT ALL
SUM OF ALL RESPONSES TO PHQ QUESTIONS 1-9: 0

## 2024-04-17 ASSESSMENT — LIFESTYLE VARIABLES: HOW OFTEN DO YOU HAVE A DRINK CONTAINING ALCOHOL: NEVER
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
## 2024-04-17 NOTE — PATIENT INSTRUCTIONS
Care instructions adapted under license by [employer]. If you have questions about a medical condition or this instruction, always ask your healthcare professional. [employer], [employer] disclaims any warranty or liability for your use of this information.      Personalized Preventive Plan for Neftali Henderson Jr - 4/17/2024  Medicare offers a range of preventive health benefits. Some of the tests and screenings are paid in full while other may be subject to a deductible, co-insurance, and/or copay.    Some of these benefits include a comprehensive review of your medical history including lifestyle, illnesses that may run in your family, and various assessments and screenings as appropriate.    After reviewing your medical record and screening and assessments performed today your provider may have ordered immunizations, labs, imaging, and/or referrals for you.  A list of these orders (if applicable) as well as your Preventive Care list are included within your After Visit Summary for your review.    Other Preventive Recommendations:    A preventive eye exam performed by an eye specialist is recommended every 1-2 years to screen for glaucoma; cataracts, macular degeneration, and other eye disorders.  A preventive dental visit is recommended every 6 months.  Try to get at least 150 minutes of exercise per week or 10,000 steps per day on a pedometer .  Order or download the FREE \"Exercise & Physical Activity: Your Everyday Guide\" from [employer] [place] [employer]. Call 1-959.206.2066 or search [employer] [place] [employer] online.  You need 9210-2779 mg of calcium and 7198-7281 IU of vitamin D per day. It is possible to meet your calcium requirement with diet alone, but a vitamin D supplement is usually necessary to meet this goal.  When exposed to the sun, use a sunscreen that protects against both UVA and UVB radiation with an SPF of 30 or greater. Reapply every 2 to 3 hours or after sweating, drying off

## 2024-05-08 ENCOUNTER — HOSPITAL ENCOUNTER (EMERGENCY)
Facility: HOSPITAL | Age: 89
Discharge: ANOTHER HOSPITAL | End: 2024-05-08
Attending: EMERGENCY MEDICINE
Payer: MEDICARE

## 2024-05-08 ENCOUNTER — APPOINTMENT (OUTPATIENT)
Facility: HOSPITAL | Age: 89
End: 2024-05-08
Payer: MEDICARE

## 2024-05-08 ENCOUNTER — HOSPITAL ENCOUNTER (INPATIENT)
Facility: HOSPITAL | Age: 89
LOS: 5 days | Discharge: NURSING, REHAB OR LONG-TERM CARE | DRG: 082 | End: 2024-05-13
Attending: ANESTHESIOLOGY | Admitting: ANESTHESIOLOGY
Payer: MEDICARE

## 2024-05-08 VITALS
TEMPERATURE: 97.8 F | BODY MASS INDEX: 26.68 KG/M2 | DIASTOLIC BLOOD PRESSURE: 73 MMHG | HEIGHT: 67 IN | WEIGHT: 170 LBS | OXYGEN SATURATION: 97 % | SYSTOLIC BLOOD PRESSURE: 151 MMHG | HEART RATE: 112 BPM | RESPIRATION RATE: 22 BRPM

## 2024-05-08 DIAGNOSIS — S06.5XAA SUBDURAL HEMATOMA (HCC): Primary | ICD-10-CM

## 2024-05-08 DIAGNOSIS — I60.9 SUBARACHNOID BLEED (HCC): ICD-10-CM

## 2024-05-08 LAB
ALBUMIN SERPL-MCNC: 4.1 G/DL (ref 3.5–5)
ALBUMIN/GLOB SERPL: 1.3 (ref 1.1–2.2)
ALP SERPL-CCNC: 89 U/L (ref 45–117)
ALT SERPL-CCNC: 21 U/L (ref 12–78)
ANION GAP SERPL CALC-SCNC: 10 MMOL/L (ref 5–15)
AST SERPL-CCNC: 28 U/L (ref 15–37)
BASOPHILS # BLD: 0.1 K/UL (ref 0–0.1)
BASOPHILS NFR BLD: 1 % (ref 0–1)
BILIRUB SERPL-MCNC: 0.8 MG/DL (ref 0.2–1)
BUN SERPL-MCNC: 20 MG/DL (ref 6–20)
BUN/CREAT SERPL: 16 (ref 12–20)
CALCIUM SERPL-MCNC: 9.5 MG/DL (ref 8.5–10.1)
CHLORIDE SERPL-SCNC: 104 MMOL/L (ref 97–108)
CO2 SERPL-SCNC: 27 MMOL/L (ref 21–32)
CREAT SERPL-MCNC: 1.27 MG/DL (ref 0.7–1.3)
DIFFERENTIAL METHOD BLD: ABNORMAL
EOSINOPHIL # BLD: 0 K/UL (ref 0–0.4)
EOSINOPHIL NFR BLD: 0 % (ref 0–7)
ERYTHROCYTE [DISTWIDTH] IN BLOOD BY AUTOMATED COUNT: 12.4 % (ref 11.5–14.5)
GLOBULIN SER CALC-MCNC: 3.2 G/DL (ref 2–4)
GLUCOSE SERPL-MCNC: 116 MG/DL (ref 65–100)
HCT VFR BLD AUTO: 40.1 % (ref 36.6–50.3)
HGB BLD-MCNC: 14.1 G/DL (ref 12.1–17)
IMM GRANULOCYTES # BLD AUTO: 0 K/UL (ref 0–0.04)
IMM GRANULOCYTES NFR BLD AUTO: 0 % (ref 0–0.5)
INR PPP: 1.1 (ref 0.9–1.1)
LYMPHOCYTES # BLD: 0.4 K/UL (ref 0.8–3.5)
LYMPHOCYTES NFR BLD: 5 % (ref 12–49)
MCH RBC QN AUTO: 32.6 PG (ref 26–34)
MCHC RBC AUTO-ENTMCNC: 35.2 G/DL (ref 30–36.5)
MCV RBC AUTO: 92.6 FL (ref 80–99)
MONOCYTES # BLD: 0.3 K/UL (ref 0–1)
MONOCYTES NFR BLD: 3 % (ref 5–13)
NEUTS BAND NFR BLD MANUAL: 1 %
NEUTS SEG # BLD: 7.7 K/UL (ref 1.8–8)
NEUTS SEG NFR BLD: 90 % (ref 32–75)
NRBC # BLD: 0 K/UL (ref 0–0.01)
NRBC BLD-RTO: 0 PER 100 WBC
PLATELET # BLD AUTO: 198 K/UL (ref 150–400)
PLATELET COMMENT: ABNORMAL
PMV BLD AUTO: 9.5 FL (ref 8.9–12.9)
POTASSIUM SERPL-SCNC: 3.9 MMOL/L (ref 3.5–5.1)
PROT SERPL-MCNC: 7.3 G/DL (ref 6.4–8.2)
PROTHROMBIN TIME: 10.4 SEC (ref 9–11.1)
RBC # BLD AUTO: 4.33 M/UL (ref 4.1–5.7)
RBC MORPH BLD: ABNORMAL
SODIUM SERPL-SCNC: 141 MMOL/L (ref 136–145)
WBC # BLD AUTO: 8.5 K/UL (ref 4.1–11.1)

## 2024-05-08 PROCEDURE — 70450 CT HEAD/BRAIN W/O DYE: CPT

## 2024-05-08 PROCEDURE — 96366 THER/PROPH/DIAG IV INF ADDON: CPT

## 2024-05-08 PROCEDURE — 96374 THER/PROPH/DIAG INJ IV PUSH: CPT

## 2024-05-08 PROCEDURE — 6360000002 HC RX W HCPCS: Performed by: EMERGENCY MEDICINE

## 2024-05-08 PROCEDURE — 72125 CT NECK SPINE W/O DYE: CPT

## 2024-05-08 PROCEDURE — 36415 COLL VENOUS BLD VENIPUNCTURE: CPT

## 2024-05-08 PROCEDURE — 6360000002 HC RX W HCPCS

## 2024-05-08 PROCEDURE — 93005 ELECTROCARDIOGRAM TRACING: CPT | Performed by: EMERGENCY MEDICINE

## 2024-05-08 PROCEDURE — APPNB45 APP NON BILLABLE 31-45 MINUTES

## 2024-05-08 PROCEDURE — 85610 PROTHROMBIN TIME: CPT

## 2024-05-08 PROCEDURE — 96365 THER/PROPH/DIAG IV INF INIT: CPT

## 2024-05-08 PROCEDURE — 2580000003 HC RX 258: Performed by: EMERGENCY MEDICINE

## 2024-05-08 PROCEDURE — 99285 EMERGENCY DEPT VISIT HI MDM: CPT

## 2024-05-08 PROCEDURE — 85025 COMPLETE CBC W/AUTO DIFF WBC: CPT

## 2024-05-08 PROCEDURE — 80053 COMPREHEN METABOLIC PANEL: CPT

## 2024-05-08 PROCEDURE — 96376 TX/PRO/DX INJ SAME DRUG ADON: CPT

## 2024-05-08 PROCEDURE — 2000000000 HC ICU R&B

## 2024-05-08 RX ORDER — POLYETHYLENE GLYCOL 3350 17 G/17G
17 POWDER, FOR SOLUTION ORAL DAILY PRN
Status: DISCONTINUED | OUTPATIENT
Start: 2024-05-08 | End: 2024-05-13 | Stop reason: HOSPADM

## 2024-05-08 RX ORDER — ONDANSETRON 4 MG/1
4 TABLET, ORALLY DISINTEGRATING ORAL EVERY 8 HOURS PRN
Status: DISCONTINUED | OUTPATIENT
Start: 2024-05-08 | End: 2024-05-09

## 2024-05-08 RX ORDER — ACETAMINOPHEN 325 MG/1
650 TABLET ORAL EVERY 6 HOURS PRN
Status: DISCONTINUED | OUTPATIENT
Start: 2024-05-08 | End: 2024-05-09

## 2024-05-08 RX ORDER — ONDANSETRON 2 MG/ML
4 INJECTION INTRAMUSCULAR; INTRAVENOUS ONCE
Status: COMPLETED | OUTPATIENT
Start: 2024-05-08 | End: 2024-05-08

## 2024-05-08 RX ORDER — SODIUM CHLORIDE 0.9 % (FLUSH) 0.9 %
5-40 SYRINGE (ML) INJECTION PRN
Status: DISCONTINUED | OUTPATIENT
Start: 2024-05-08 | End: 2024-05-13 | Stop reason: HOSPADM

## 2024-05-08 RX ORDER — ONDANSETRON 2 MG/ML
INJECTION INTRAMUSCULAR; INTRAVENOUS
Status: COMPLETED
Start: 2024-05-08 | End: 2024-05-08

## 2024-05-08 RX ORDER — ONDANSETRON 2 MG/ML
4 INJECTION INTRAMUSCULAR; INTRAVENOUS EVERY 6 HOURS PRN
Status: DISCONTINUED | OUTPATIENT
Start: 2024-05-08 | End: 2024-05-09

## 2024-05-08 RX ORDER — SODIUM CHLORIDE 0.9 % (FLUSH) 0.9 %
5-40 SYRINGE (ML) INJECTION EVERY 12 HOURS SCHEDULED
Status: DISCONTINUED | OUTPATIENT
Start: 2024-05-09 | End: 2024-05-13 | Stop reason: HOSPADM

## 2024-05-08 RX ORDER — ACETAMINOPHEN 650 MG/1
650 SUPPOSITORY RECTAL EVERY 6 HOURS PRN
Status: DISCONTINUED | OUTPATIENT
Start: 2024-05-08 | End: 2024-05-13 | Stop reason: HOSPADM

## 2024-05-08 RX ORDER — SODIUM CHLORIDE 9 MG/ML
INJECTION, SOLUTION INTRAVENOUS PRN
Status: DISCONTINUED | OUTPATIENT
Start: 2024-05-08 | End: 2024-05-13 | Stop reason: HOSPADM

## 2024-05-08 RX ORDER — POTASSIUM CHLORIDE 29.8 MG/ML
20 INJECTION INTRAVENOUS PRN
Status: DISCONTINUED | OUTPATIENT
Start: 2024-05-08 | End: 2024-05-13 | Stop reason: HOSPADM

## 2024-05-08 RX ORDER — POTASSIUM CHLORIDE 7.45 MG/ML
10 INJECTION INTRAVENOUS PRN
Status: DISCONTINUED | OUTPATIENT
Start: 2024-05-08 | End: 2024-05-13 | Stop reason: HOSPADM

## 2024-05-08 RX ORDER — MAGNESIUM SULFATE IN WATER 40 MG/ML
2000 INJECTION, SOLUTION INTRAVENOUS PRN
Status: DISCONTINUED | OUTPATIENT
Start: 2024-05-08 | End: 2024-05-13 | Stop reason: HOSPADM

## 2024-05-08 RX ADMIN — ONDANSETRON 4 MG: 2 INJECTION INTRAMUSCULAR; INTRAVENOUS at 22:02

## 2024-05-08 RX ADMIN — SODIUM CHLORIDE 5 MG/HR: 9 INJECTION, SOLUTION INTRAVENOUS at 19:43

## 2024-05-08 RX ADMIN — ONDANSETRON 4 MG: 2 INJECTION INTRAMUSCULAR; INTRAVENOUS at 17:46

## 2024-05-08 RX ADMIN — ONDANSETRON 4 MG: 2 INJECTION INTRAMUSCULAR; INTRAVENOUS at 19:38

## 2024-05-08 ASSESSMENT — PAIN - FUNCTIONAL ASSESSMENT: PAIN_FUNCTIONAL_ASSESSMENT: NONE - DENIES PAIN

## 2024-05-08 ASSESSMENT — LIFESTYLE VARIABLES
HOW OFTEN DO YOU HAVE A DRINK CONTAINING ALCOHOL: NEVER
HOW MANY STANDARD DRINKS CONTAINING ALCOHOL DO YOU HAVE ON A TYPICAL DAY: PATIENT DOES NOT DRINK

## 2024-05-08 NOTE — ED PROVIDER NOTES
laboratory studies and re-evaluation of patient's condition       EMERGENCY DEPARTMENT COURSE and DIFFERENTIAL DIAGNOSIS/MDM   Vitals:    Vitals:    05/08/24 1716   BP: (!) 193/99   Pulse: 84   Resp: 16   SpO2: 99%   Weight: 77.1 kg (170 lb)   Height: 1.702 m (5' 7\")        Patient was given the following medications:  Medications   niCARdipine (CARDENE) 25 mg in sodium chloride 0.9 % 250 mL infusion ([id number]) (has no administration in time range)   ondansetron (ZOFRAN) injection 4 mg (has no administration in time range)   ondansetron (ZOFRAN) injection 4 mg (4 mg IntraVENous Given 5/8/24 1746)       Medical Decision Making  Amount and/or Complexity of Data Reviewed  Labs: ordered.  Radiology: ordered.  ECG/medicine tests: ordered.    Risk  Prescription drug management.        89-year-old male with fall, unwitnessed.  Phone number in chart goes to busy signal.  No other contacts listed.  CT head and C-spine ordered.  EKG and basic lab work also ordered due to concern for syncope.  Wife reported to EMS the patient has no history of dementia however chart review reveals patient has history of dementia/memory issues as noted in discharge information from nurse practitioner this year as well as and past medical history.    ED Course as of 05/08/24 1930   Wed May 08, 2024   1900 Case discussed with radiologist Dr. Butler.  Abnormal CT with subdural and subarachnoid noted.  Case discussed with [facility] in order to discuss transfer.  Neurosurgery paged. [PV]   1901 EKG interpreted by me.  Sinus, rate 93, narrow QRS. [PV]   1908 Case discussed with Dr. Braxton, Neurosurgeon who recommends admission to ICU at [place].  Will discuss case with intensivist. [PV]   1928 Case discussed with Dr. Solis, ICU intensivist who accepts patient for admission. [PV]      ED Course User Index  [PV] Deena Romero MD     7:32 PM EDT  I again attempted to contact wife based on phone number in chart.  Busy signal

## 2024-05-08 NOTE — ED TRIAGE NOTES
Pt arrives via EMS after an unwitnessed fall at home. Pt does not recall any of the events leading up to the event. Unknown baseline mentation at this time, but at triage pt is A/Ox2. Laceration to posterior head.

## 2024-05-09 ENCOUNTER — APPOINTMENT (OUTPATIENT)
Facility: HOSPITAL | Age: 89
DRG: 082 | End: 2024-05-09
Attending: ANESTHESIOLOGY
Payer: MEDICARE

## 2024-05-09 LAB
ANION GAP SERPL CALC-SCNC: 8 MMOL/L (ref 5–15)
APTT PPP: 26.8 SEC (ref 22.1–31)
BUN SERPL-MCNC: 22 MG/DL (ref 6–20)
BUN/CREAT SERPL: 17 (ref 12–20)
CALCIUM SERPL-MCNC: 9.4 MG/DL (ref 8.5–10.1)
CHLORIDE SERPL-SCNC: 108 MMOL/L (ref 97–108)
CO2 SERPL-SCNC: 25 MMOL/L (ref 21–32)
CREAT SERPL-MCNC: 1.28 MG/DL (ref 0.7–1.3)
ERYTHROCYTE [DISTWIDTH] IN BLOOD BY AUTOMATED COUNT: 12.6 % (ref 11.5–14.5)
GLUCOSE BLD STRIP.AUTO-MCNC: 138 MG/DL (ref 65–117)
GLUCOSE SERPL-MCNC: 154 MG/DL (ref 65–100)
HCT VFR BLD AUTO: 38.7 % (ref 36.6–50.3)
HGB BLD-MCNC: 13.4 G/DL (ref 12.1–17)
INR PPP: 1.1 (ref 0.9–1.1)
MAGNESIUM SERPL-MCNC: 2.2 MG/DL (ref 1.6–2.4)
MCH RBC QN AUTO: 32.4 PG (ref 26–34)
MCHC RBC AUTO-ENTMCNC: 34.6 G/DL (ref 30–36.5)
MCV RBC AUTO: 93.7 FL (ref 80–99)
NRBC # BLD: 0 K/UL (ref 0–0.01)
NRBC BLD-RTO: 0 PER 100 WBC
PHOSPHATE SERPL-MCNC: 3.3 MG/DL (ref 2.6–4.7)
PLATELET # BLD AUTO: 201 K/UL (ref 150–400)
PMV BLD AUTO: 9.5 FL (ref 8.9–12.9)
POTASSIUM SERPL-SCNC: 4.2 MMOL/L (ref 3.5–5.1)
PROTHROMBIN TIME: 11.6 SEC (ref 9–11.1)
RBC # BLD AUTO: 4.13 M/UL (ref 4.1–5.7)
SERVICE CMNT-IMP: ABNORMAL
SODIUM SERPL-SCNC: 141 MMOL/L (ref 136–145)
THERAPEUTIC RANGE: NORMAL SECS (ref 58–77)
WBC # BLD AUTO: 7.7 K/UL (ref 4.1–11.1)

## 2024-05-09 PROCEDURE — 51798 US URINE CAPACITY MEASURE: CPT

## 2024-05-09 PROCEDURE — 97530 THERAPEUTIC ACTIVITIES: CPT

## 2024-05-09 PROCEDURE — 85610 PROTHROMBIN TIME: CPT

## 2024-05-09 PROCEDURE — 82962 GLUCOSE BLOOD TEST: CPT

## 2024-05-09 PROCEDURE — 70450 CT HEAD/BRAIN W/O DYE: CPT

## 2024-05-09 PROCEDURE — 83735 ASSAY OF MAGNESIUM: CPT

## 2024-05-09 PROCEDURE — 6370000000 HC RX 637 (ALT 250 FOR IP): Performed by: NURSE PRACTITIONER

## 2024-05-09 PROCEDURE — 97165 OT EVAL LOW COMPLEX 30 MIN: CPT

## 2024-05-09 PROCEDURE — 80048 BASIC METABOLIC PNL TOTAL CA: CPT

## 2024-05-09 PROCEDURE — 97161 PT EVAL LOW COMPLEX 20 MIN: CPT

## 2024-05-09 PROCEDURE — 2580000003 HC RX 258: Performed by: NURSE PRACTITIONER

## 2024-05-09 PROCEDURE — 85027 COMPLETE CBC AUTOMATED: CPT

## 2024-05-09 PROCEDURE — 6360000002 HC RX W HCPCS: Performed by: NURSE PRACTITIONER

## 2024-05-09 PROCEDURE — 2060000000 HC ICU INTERMEDIATE R&B

## 2024-05-09 PROCEDURE — 85730 THROMBOPLASTIN TIME PARTIAL: CPT

## 2024-05-09 PROCEDURE — 84100 ASSAY OF PHOSPHORUS: CPT

## 2024-05-09 PROCEDURE — APPNB45 APP NON BILLABLE 31-45 MINUTES: Performed by: NURSE PRACTITIONER

## 2024-05-09 PROCEDURE — 36415 COLL VENOUS BLD VENIPUNCTURE: CPT

## 2024-05-09 PROCEDURE — 92610 EVALUATE SWALLOWING FUNCTION: CPT

## 2024-05-09 RX ORDER — SODIUM CHLORIDE 9 MG/ML
INJECTION, SOLUTION INTRAVENOUS PRN
Status: DISCONTINUED | OUTPATIENT
Start: 2024-05-09 | End: 2024-05-13 | Stop reason: HOSPADM

## 2024-05-09 RX ORDER — ATORVASTATIN CALCIUM 10 MG/1
10 TABLET, FILM COATED ORAL NIGHTLY
Status: DISCONTINUED | OUTPATIENT
Start: 2024-05-09 | End: 2024-05-13 | Stop reason: HOSPADM

## 2024-05-09 RX ORDER — ONDANSETRON 2 MG/ML
4 INJECTION INTRAMUSCULAR; INTRAVENOUS EVERY 6 HOURS PRN
Status: DISCONTINUED | OUTPATIENT
Start: 2024-05-09 | End: 2024-05-13 | Stop reason: HOSPADM

## 2024-05-09 RX ORDER — LABETALOL HYDROCHLORIDE 5 MG/ML
10 INJECTION, SOLUTION INTRAVENOUS EVERY 4 HOURS PRN
Status: DISCONTINUED | OUTPATIENT
Start: 2024-05-09 | End: 2024-05-13 | Stop reason: HOSPADM

## 2024-05-09 RX ORDER — ACETAMINOPHEN 325 MG/1
650 TABLET ORAL EVERY 4 HOURS PRN
Status: DISCONTINUED | OUTPATIENT
Start: 2024-05-09 | End: 2024-05-13 | Stop reason: HOSPADM

## 2024-05-09 RX ORDER — LORAZEPAM 2 MG/ML
4 INJECTION INTRAMUSCULAR EVERY 5 MIN PRN
Status: CANCELLED | OUTPATIENT
Start: 2024-05-09

## 2024-05-09 RX ORDER — SODIUM CHLORIDE 0.9 % (FLUSH) 0.9 %
5-40 SYRINGE (ML) INJECTION EVERY 12 HOURS SCHEDULED
Status: DISCONTINUED | OUTPATIENT
Start: 2024-05-09 | End: 2024-05-13 | Stop reason: HOSPADM

## 2024-05-09 RX ORDER — THIAMINE HYDROCHLORIDE 100 MG/ML
100 INJECTION, SOLUTION INTRAMUSCULAR; INTRAVENOUS
Status: CANCELLED | OUTPATIENT
Start: 2024-05-09 | End: 2024-05-10

## 2024-05-09 RX ORDER — METHIMAZOLE 5 MG/1
5 TABLET ORAL DAILY
Status: DISCONTINUED | OUTPATIENT
Start: 2024-05-09 | End: 2024-05-13 | Stop reason: HOSPADM

## 2024-05-09 RX ORDER — HYDRALAZINE HYDROCHLORIDE 20 MG/ML
10 INJECTION INTRAMUSCULAR; INTRAVENOUS EVERY 4 HOURS PRN
Status: DISCONTINUED | OUTPATIENT
Start: 2024-05-09 | End: 2024-05-13 | Stop reason: HOSPADM

## 2024-05-09 RX ORDER — ONDANSETRON 4 MG/1
4 TABLET, ORALLY DISINTEGRATING ORAL EVERY 8 HOURS PRN
Status: DISCONTINUED | OUTPATIENT
Start: 2024-05-09 | End: 2024-05-13 | Stop reason: HOSPADM

## 2024-05-09 RX ORDER — SODIUM CHLORIDE 0.9 % (FLUSH) 0.9 %
5-40 SYRINGE (ML) INJECTION PRN
Status: DISCONTINUED | OUTPATIENT
Start: 2024-05-09 | End: 2024-05-13 | Stop reason: HOSPADM

## 2024-05-09 RX ORDER — TAMSULOSIN HYDROCHLORIDE 0.4 MG/1
0.4 CAPSULE ORAL DAILY
Status: DISCONTINUED | OUTPATIENT
Start: 2024-05-09 | End: 2024-05-13 | Stop reason: HOSPADM

## 2024-05-09 RX ORDER — METOPROLOL SUCCINATE 25 MG/1
25 TABLET, EXTENDED RELEASE ORAL DAILY
Status: DISCONTINUED | OUTPATIENT
Start: 2024-05-09 | End: 2024-05-13 | Stop reason: HOSPADM

## 2024-05-09 RX ADMIN — TAMSULOSIN HYDROCHLORIDE 0.4 MG: 0.4 CAPSULE ORAL at 13:54

## 2024-05-09 RX ADMIN — METOPROLOL SUCCINATE 25 MG: 25 TABLET, EXTENDED RELEASE ORAL at 12:39

## 2024-05-09 RX ADMIN — LABETALOL HYDROCHLORIDE 10 MG: 5 INJECTION, SOLUTION INTRAVENOUS at 11:10

## 2024-05-09 RX ADMIN — ATORVASTATIN CALCIUM 10 MG: 10 TABLET, FILM COATED ORAL at 22:00

## 2024-05-09 RX ADMIN — SODIUM CHLORIDE, PRESERVATIVE FREE 10 ML: 5 INJECTION INTRAVENOUS at 10:48

## 2024-05-09 RX ADMIN — LABETALOL HYDROCHLORIDE 10 MG: 5 INJECTION, SOLUTION INTRAVENOUS at 15:22

## 2024-05-09 RX ADMIN — SODIUM CHLORIDE 7.5 MG/HR: 9 INJECTION, SOLUTION INTRAVENOUS at 00:16

## 2024-05-09 RX ADMIN — HYDRALAZINE HYDROCHLORIDE 10 MG: 20 INJECTION INTRAMUSCULAR; INTRAVENOUS at 15:38

## 2024-05-09 RX ADMIN — METHIMAZOLE 5 MG: 5 TABLET ORAL at 08:56

## 2024-05-09 RX ADMIN — SODIUM CHLORIDE 7.5 MG/HR: 9 INJECTION, SOLUTION INTRAVENOUS at 03:03

## 2024-05-09 NOTE — ED NOTES
Report given to Linwood Kirk, paramedic with [facility] on this patient. [facility] assuming care of patient at this time.

## 2024-05-09 NOTE — ED NOTES
TRANSFER - OUT REPORT:    Verbal report given to Roger Ch RN on Neftali Henderson Jr  being transferred to [facility] ICU bed #[id number] for routine progression of patient care       Report consisted of patient's Situation, Background, Assessment and   Recommendations(SBAR).     Information from the following report(s) Nurse Handoff Report, ED Encounter Summary, ED SBAR, Adult Overview, MAR, Recent Results, Cardiac Rhythm sinus rhythm, and Neuro Assessment was reviewed with the receiving nurse.    [place] Fall Assessment:    Presents to emergency department  because of falls (Syncope, seizure, or loss of consciousness): Yes  Age > 70: Yes  Altered Mental Status, Intoxication with alcohol or substance confusion (Disorientation, impaired judgment, poor safety awaremess, or inability to follow instructions): Yes  Impaired Mobility: Ambulates or transfers with assistive devices or assistance; Unable to ambulate or transer.: No  Nursing Judgement: Yes          Lines:   Peripheral IV 05/08/24 Left Antecubital (Active)       Peripheral IV 05/08/24 Right Antecubital (Active)        Opportunity for questions and clarification was provided.      Patient transported with:  Monitor

## 2024-05-09 NOTE — ED NOTES
Final attempt made to contact patient's wife before transfer. Only able to get busy signal so unable to leave messages.
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
## 2024-05-09 NOTE — CONSULTS
89yo with dementia, frequent falls on daily baby ASA s/p unwitnessed GLF.  Presented to OSH.  Head CT trace left frontal SDH and right parietal sah.  Required cardene gtt fro BP control.  Repeat CT this am stable to improved.  Recommend continue BP control.  Okay to change neuro checks to every 4 hours.  He will need follow up CT in 4 weeks to rule out the development of chronic SDH.

## 2024-05-09 NOTE — ED NOTES
[facility] dispatch called for ALS transport of patient to [facility] bed 7101. Spoke with Lizbeth. Provided dispatch with information for trasnport (demographics, , name, wt.m insurance information, medical equipement that is needed such as cardiac monitor and IV pump, no isolation, and diagnosis of subdural hematoma and subarachnoid bleed). Also, informed dispatch that the patient had Cardene drip @ 5 mg/ hr infusing. Informed of ETA of 90 to 120 minutes. ED nurse, KYLEE Stroud notified of ETA. Primary nurse asked the ED physician, Dr. Tucker, if the ETA was acceptable. Dr Tucker' response was that it was acceptable.

## 2024-05-10 LAB
APPEARANCE UR: CLEAR
BACTERIA URNS QL MICRO: NEGATIVE /HPF
BILIRUB UR QL: NEGATIVE
COLOR UR: ABNORMAL
EKG ATRIAL RATE: 93 BPM
EKG DIAGNOSIS: NORMAL
EKG P AXIS: 43 DEGREES
EKG P-R INTERVAL: 148 MS
EKG Q-T INTERVAL: 362 MS
EKG QRS DURATION: 80 MS
EKG QTC CALCULATION (BAZETT): 450 MS
EKG R AXIS: -19 DEGREES
EKG T AXIS: 34 DEGREES
EKG VENTRICULAR RATE: 93 BPM
EPITH CASTS URNS QL MICRO: ABNORMAL /LPF
GLUCOSE UR STRIP.AUTO-MCNC: NEGATIVE MG/DL
HGB UR QL STRIP: NEGATIVE
HYALINE CASTS URNS QL MICRO: ABNORMAL /LPF (ref 0–5)
KETONES UR QL STRIP.AUTO: ABNORMAL MG/DL
LEUKOCYTE ESTERASE UR QL STRIP.AUTO: NEGATIVE
NITRITE UR QL STRIP.AUTO: NEGATIVE
PH UR STRIP: 6 (ref 5–8)
PROT UR STRIP-MCNC: 30 MG/DL
RBC #/AREA URNS HPF: ABNORMAL /HPF (ref 0–5)
SP GR UR REFRACTOMETRY: 1.02 (ref 1–1.03)
URINE CULTURE IF INDICATED: ABNORMAL
UROBILINOGEN UR QL STRIP.AUTO: 0.2 EU/DL (ref 0.2–1)
WBC URNS QL MICRO: ABNORMAL /HPF (ref 0–4)

## 2024-05-10 PROCEDURE — 51798 US URINE CAPACITY MEASURE: CPT

## 2024-05-10 PROCEDURE — 6370000000 HC RX 637 (ALT 250 FOR IP): Performed by: NURSE PRACTITIONER

## 2024-05-10 PROCEDURE — 2580000003 HC RX 258: Performed by: NURSE PRACTITIONER

## 2024-05-10 PROCEDURE — 6360000002 HC RX W HCPCS: Performed by: NURSE PRACTITIONER

## 2024-05-10 PROCEDURE — 2060000000 HC ICU INTERMEDIATE R&B

## 2024-05-10 PROCEDURE — 81001 URINALYSIS AUTO W/SCOPE: CPT

## 2024-05-10 PROCEDURE — 97530 THERAPEUTIC ACTIVITIES: CPT

## 2024-05-10 PROCEDURE — 97535 SELF CARE MNGMENT TRAINING: CPT

## 2024-05-10 RX ADMIN — TAMSULOSIN HYDROCHLORIDE 0.4 MG: 0.4 CAPSULE ORAL at 09:47

## 2024-05-10 RX ADMIN — METOPROLOL SUCCINATE 25 MG: 25 TABLET, EXTENDED RELEASE ORAL at 09:47

## 2024-05-10 RX ADMIN — LABETALOL HYDROCHLORIDE 10 MG: 5 INJECTION, SOLUTION INTRAVENOUS at 15:34

## 2024-05-10 RX ADMIN — HYDRALAZINE HYDROCHLORIDE 10 MG: 20 INJECTION INTRAMUSCULAR; INTRAVENOUS at 16:29

## 2024-05-10 RX ADMIN — SODIUM CHLORIDE, PRESERVATIVE FREE 5 ML: 5 INJECTION INTRAVENOUS at 01:01

## 2024-05-10 RX ADMIN — SODIUM CHLORIDE, PRESERVATIVE FREE 10 ML: 5 INJECTION INTRAVENOUS at 09:48

## 2024-05-10 RX ADMIN — METHIMAZOLE 5 MG: 5 TABLET ORAL at 09:47

## 2024-05-10 RX ADMIN — ATORVASTATIN CALCIUM 10 MG: 10 TABLET, FILM COATED ORAL at 21:20

## 2024-05-11 LAB
ANION GAP SERPL CALC-SCNC: 7 MMOL/L (ref 5–15)
BASOPHILS # BLD: 0 K/UL (ref 0–0.1)
BASOPHILS NFR BLD: 0 % (ref 0–1)
BUN SERPL-MCNC: 33 MG/DL (ref 6–20)
BUN/CREAT SERPL: 35 (ref 12–20)
CALCIUM SERPL-MCNC: 9 MG/DL (ref 8.5–10.1)
CHLORIDE SERPL-SCNC: 106 MMOL/L (ref 97–108)
CO2 SERPL-SCNC: 24 MMOL/L (ref 21–32)
CREAT SERPL-MCNC: 0.95 MG/DL (ref 0.7–1.3)
DIFFERENTIAL METHOD BLD: ABNORMAL
EOSINOPHIL # BLD: 0 K/UL (ref 0–0.4)
EOSINOPHIL NFR BLD: 1 % (ref 0–7)
ERYTHROCYTE [DISTWIDTH] IN BLOOD BY AUTOMATED COUNT: 12.4 % (ref 11.5–14.5)
GLUCOSE SERPL-MCNC: 93 MG/DL (ref 65–100)
HCT VFR BLD AUTO: 35.7 % (ref 36.6–50.3)
HGB BLD-MCNC: 12.5 G/DL (ref 12.1–17)
IMM GRANULOCYTES # BLD AUTO: 0 K/UL (ref 0–0.04)
IMM GRANULOCYTES NFR BLD AUTO: 0 % (ref 0–0.5)
LYMPHOCYTES # BLD: 1.1 K/UL (ref 0.8–3.5)
LYMPHOCYTES NFR BLD: 17 % (ref 12–49)
MCH RBC QN AUTO: 32.9 PG (ref 26–34)
MCHC RBC AUTO-ENTMCNC: 35 G/DL (ref 30–36.5)
MCV RBC AUTO: 93.9 FL (ref 80–99)
MONOCYTES # BLD: 0.9 K/UL (ref 0–1)
MONOCYTES NFR BLD: 14 % (ref 5–13)
NEUTS SEG # BLD: 4.7 K/UL (ref 1.8–8)
NEUTS SEG NFR BLD: 68 % (ref 32–75)
NRBC # BLD: 0 K/UL (ref 0–0.01)
NRBC BLD-RTO: 0 PER 100 WBC
PLATELET # BLD AUTO: 176 K/UL (ref 150–400)
PMV BLD AUTO: 9.9 FL (ref 8.9–12.9)
POTASSIUM SERPL-SCNC: 3.5 MMOL/L (ref 3.5–5.1)
RBC # BLD AUTO: 3.8 M/UL (ref 4.1–5.7)
SODIUM SERPL-SCNC: 137 MMOL/L (ref 136–145)
WBC # BLD AUTO: 6.8 K/UL (ref 4.1–11.1)

## 2024-05-11 PROCEDURE — 51798 US URINE CAPACITY MEASURE: CPT

## 2024-05-11 PROCEDURE — 2580000003 HC RX 258: Performed by: NURSE PRACTITIONER

## 2024-05-11 PROCEDURE — 6370000000 HC RX 637 (ALT 250 FOR IP): Performed by: HOSPITALIST

## 2024-05-11 PROCEDURE — 6360000002 HC RX W HCPCS: Performed by: NURSE PRACTITIONER

## 2024-05-11 PROCEDURE — 6370000000 HC RX 637 (ALT 250 FOR IP): Performed by: NURSE PRACTITIONER

## 2024-05-11 PROCEDURE — 51701 INSERT BLADDER CATHETER: CPT

## 2024-05-11 PROCEDURE — 2060000000 HC ICU INTERMEDIATE R&B

## 2024-05-11 PROCEDURE — 80048 BASIC METABOLIC PNL TOTAL CA: CPT

## 2024-05-11 PROCEDURE — 85025 COMPLETE CBC W/AUTO DIFF WBC: CPT

## 2024-05-11 PROCEDURE — 36415 COLL VENOUS BLD VENIPUNCTURE: CPT

## 2024-05-11 RX ORDER — TRAZODONE HYDROCHLORIDE 50 MG/1
50 TABLET ORAL NIGHTLY
Status: DISCONTINUED | OUTPATIENT
Start: 2024-05-11 | End: 2024-05-13 | Stop reason: HOSPADM

## 2024-05-11 RX ADMIN — HYDRALAZINE HYDROCHLORIDE 10 MG: 20 INJECTION INTRAMUSCULAR; INTRAVENOUS at 04:42

## 2024-05-11 RX ADMIN — POLYETHYLENE GLYCOL 3350 17 G: 17 POWDER, FOR SOLUTION ORAL at 10:44

## 2024-05-11 RX ADMIN — SODIUM CHLORIDE, PRESERVATIVE FREE 10 ML: 5 INJECTION INTRAVENOUS at 22:16

## 2024-05-11 RX ADMIN — TRAZODONE HYDROCHLORIDE 50 MG: 50 TABLET ORAL at 22:12

## 2024-05-11 RX ADMIN — ACETAMINOPHEN 650 MG: 325 TABLET ORAL at 10:44

## 2024-05-11 RX ADMIN — TAMSULOSIN HYDROCHLORIDE 0.4 MG: 0.4 CAPSULE ORAL at 09:11

## 2024-05-11 RX ADMIN — ATORVASTATIN CALCIUM 10 MG: 10 TABLET, FILM COATED ORAL at 22:12

## 2024-05-11 RX ADMIN — METHIMAZOLE 5 MG: 5 TABLET ORAL at 09:11

## 2024-05-11 RX ADMIN — METOPROLOL SUCCINATE 25 MG: 25 TABLET, EXTENDED RELEASE ORAL at 09:11

## 2024-05-11 RX ADMIN — SODIUM CHLORIDE, PRESERVATIVE FREE 10 ML: 5 INJECTION INTRAVENOUS at 09:13

## 2024-05-11 ASSESSMENT — PAIN DESCRIPTION - LOCATION: LOCATION: ABDOMEN

## 2024-05-12 PROCEDURE — 6370000000 HC RX 637 (ALT 250 FOR IP): Performed by: NURSE PRACTITIONER

## 2024-05-12 PROCEDURE — 6370000000 HC RX 637 (ALT 250 FOR IP): Performed by: HOSPITALIST

## 2024-05-12 PROCEDURE — 2580000003 HC RX 258: Performed by: NURSE PRACTITIONER

## 2024-05-12 PROCEDURE — 6360000002 HC RX W HCPCS: Performed by: NURSE PRACTITIONER

## 2024-05-12 PROCEDURE — 2060000000 HC ICU INTERMEDIATE R&B

## 2024-05-12 RX ADMIN — LABETALOL HYDROCHLORIDE 10 MG: 5 INJECTION, SOLUTION INTRAVENOUS at 08:03

## 2024-05-12 RX ADMIN — HYDRALAZINE HYDROCHLORIDE 10 MG: 20 INJECTION INTRAMUSCULAR; INTRAVENOUS at 03:32

## 2024-05-12 RX ADMIN — SODIUM CHLORIDE, PRESERVATIVE FREE 10 ML: 5 INJECTION INTRAVENOUS at 20:43

## 2024-05-12 RX ADMIN — HYDRALAZINE HYDROCHLORIDE 10 MG: 20 INJECTION INTRAMUSCULAR; INTRAVENOUS at 17:32

## 2024-05-12 RX ADMIN — HYDRALAZINE HYDROCHLORIDE 10 MG: 20 INJECTION INTRAMUSCULAR; INTRAVENOUS at 20:50

## 2024-05-12 RX ADMIN — SODIUM CHLORIDE, PRESERVATIVE FREE 10 ML: 5 INJECTION INTRAVENOUS at 08:08

## 2024-05-12 RX ADMIN — ACETAMINOPHEN 650 MG: 325 TABLET ORAL at 14:33

## 2024-05-12 RX ADMIN — ATORVASTATIN CALCIUM 10 MG: 10 TABLET, FILM COATED ORAL at 20:42

## 2024-05-12 RX ADMIN — ACETAMINOPHEN 650 MG: 325 TABLET ORAL at 03:32

## 2024-05-12 RX ADMIN — LABETALOL HYDROCHLORIDE 10 MG: 5 INJECTION, SOLUTION INTRAVENOUS at 14:34

## 2024-05-12 RX ADMIN — METHIMAZOLE 5 MG: 5 TABLET ORAL at 08:03

## 2024-05-12 RX ADMIN — METOPROLOL SUCCINATE 25 MG: 25 TABLET, EXTENDED RELEASE ORAL at 08:03

## 2024-05-12 RX ADMIN — SODIUM CHLORIDE, PRESERVATIVE FREE 10 ML: 5 INJECTION INTRAVENOUS at 20:45

## 2024-05-12 RX ADMIN — TAMSULOSIN HYDROCHLORIDE 0.4 MG: 0.4 CAPSULE ORAL at 08:03

## 2024-05-12 RX ADMIN — SODIUM CHLORIDE, PRESERVATIVE FREE 10 ML: 5 INJECTION INTRAVENOUS at 08:11

## 2024-05-12 RX ADMIN — TRAZODONE HYDROCHLORIDE 50 MG: 50 TABLET ORAL at 20:42

## 2024-05-12 ASSESSMENT — PAIN SCALES - GENERAL: PAINLEVEL_OUTOF10: 0

## 2024-05-12 NOTE — CARE COORDINATION
Care Management Initial Assessment       RUR: 13%  Readmission? No  1st IM letter given? Yes   1st  letter given: No    CM reviewed chart and received a consult for Inpatient Rehab.  CM met with pt's wife and adult daughter at the bedside.  Pt was independent with ADLs prior to admission, and was living at home with his wife.  Adult daughter lives in [place], but states she doesn't plan to return until Tuesday evening, so she will be here to help with decisions until then (Ольга Alcantara [phone]).  Discussed Inpatient Rehabs and offered freedom of choice.  Daughter confirmed that they prefer patient stay in the [facility] for any care as opposed to going to the [facility] for any services.  Pt's daughters has a connection with one of the therapist at [facility] and prefers that pt go there for inpatient rehab. CM sent referral through [facility] to [facility].       05/12/24 8268   Service Assessment   Patient Orientation Other (see comment)  (Confused)   Cognition Alert   History Provided By Child/Family;Significant Other   Primary Caregiver Self   Accompanied By/Relationship Wife and Adult Daughter   Support Systems Spouse/Significant Other;Children   Patient's Healthcare Decision Maker is: Legal Next of Kin   PCP Verified by CM No   Prior Functional Level Independent in ADLs/IADLs   Current Functional Level Assistance with the following:;Bathing;Dressing;Toileting;Feeding;Cooking;Housework;Shopping;Mobility   Can patient return to prior living arrangement Yes   Ability to make needs known: Poor   Family able to assist with home care needs: Yes   Would you like for me to discuss the discharge plan with any other family members/significant others, and if so, who? Yes   Social/Functional History   Lives With Spouse        Pt. fell from ladder 14 feet. C/o left arm pain. Abrasion noted to left forearm and B/L knees. Denies hitting head, LOC, or blood thinners.

## 2024-05-13 ENCOUNTER — APPOINTMENT (OUTPATIENT)
Facility: HOSPITAL | Age: 89
DRG: 082 | End: 2024-05-13
Attending: ANESTHESIOLOGY
Payer: MEDICARE

## 2024-05-13 VITALS
TEMPERATURE: 98.1 F | WEIGHT: 174.5 LBS | OXYGEN SATURATION: 98 % | HEART RATE: 88 BPM | RESPIRATION RATE: 18 BRPM | SYSTOLIC BLOOD PRESSURE: 132 MMHG | BODY MASS INDEX: 27.33 KG/M2 | DIASTOLIC BLOOD PRESSURE: 62 MMHG

## 2024-05-13 PROCEDURE — 6370000000 HC RX 637 (ALT 250 FOR IP): Performed by: NURSE PRACTITIONER

## 2024-05-13 PROCEDURE — 70450 CT HEAD/BRAIN W/O DYE: CPT

## 2024-05-13 PROCEDURE — 2580000003 HC RX 258: Performed by: NURSE PRACTITIONER

## 2024-05-13 PROCEDURE — 97530 THERAPEUTIC ACTIVITIES: CPT

## 2024-05-13 PROCEDURE — 97535 SELF CARE MNGMENT TRAINING: CPT

## 2024-05-13 PROCEDURE — 6360000002 HC RX W HCPCS: Performed by: NURSE PRACTITIONER

## 2024-05-13 RX ORDER — TAMSULOSIN HYDROCHLORIDE 0.4 MG/1
0.4 CAPSULE ORAL DAILY
Qty: 30 CAPSULE | Refills: 3 | Status: SHIPPED
Start: 2024-05-14

## 2024-05-13 RX ORDER — METOPROLOL SUCCINATE 25 MG/1
25 TABLET, EXTENDED RELEASE ORAL DAILY
Qty: 30 TABLET | Refills: 3 | Status: SHIPPED
Start: 2024-05-14

## 2024-05-13 RX ADMIN — SODIUM CHLORIDE, PRESERVATIVE FREE 10 ML: 5 INJECTION INTRAVENOUS at 08:13

## 2024-05-13 RX ADMIN — TAMSULOSIN HYDROCHLORIDE 0.4 MG: 0.4 CAPSULE ORAL at 08:10

## 2024-05-13 RX ADMIN — METOPROLOL SUCCINATE 25 MG: 25 TABLET, EXTENDED RELEASE ORAL at 08:12

## 2024-05-13 RX ADMIN — ACETAMINOPHEN 650 MG: 325 TABLET ORAL at 15:15

## 2024-05-13 RX ADMIN — METHIMAZOLE 5 MG: 5 TABLET ORAL at 08:10

## 2024-05-13 RX ADMIN — HYDRALAZINE HYDROCHLORIDE 10 MG: 20 INJECTION INTRAMUSCULAR; INTRAVENOUS at 02:01

## 2024-05-13 RX ADMIN — HYDRALAZINE HYDROCHLORIDE 10 MG: 20 INJECTION INTRAMUSCULAR; INTRAVENOUS at 06:36

## 2024-05-13 RX ADMIN — SODIUM CHLORIDE, PRESERVATIVE FREE 10 ML: 5 INJECTION INTRAVENOUS at 08:12

## 2024-05-13 RX ADMIN — LABETALOL HYDROCHLORIDE 10 MG: 5 INJECTION, SOLUTION INTRAVENOUS at 04:38

## 2024-05-13 ASSESSMENT — PAIN SCALES - GENERAL
PAINLEVEL_OUTOF10: 0
PAINLEVEL_OUTOF10: 6
PAINLEVEL_OUTOF10: 0

## 2024-05-13 ASSESSMENT — PAIN DESCRIPTION - LOCATION: LOCATION: SHOULDER

## 2024-05-13 NOTE — PROGRESS NOTES
[facility]                                                                                          Hospitalist Progress Note  Rios Avitia MD  Office Phone: [phone]        Date of Service:  2024  NAME:  Neftali Henderson Jr  :  1935  MRN:  [id number]       Admission Summary:   89 y.o. male with a past medical history of HTN, BPH, Graves Disease, CKD3 and frequent falls who came to [facility] as a transfer from [facility] following an unwitnessed GLF. CT head at [facility] showed small left frontal SDH and small SAH over right parietal. Neurosurgery was consulted and recommended transfer to [facility] ICU. He hypertensive initially requiring a cardene gtt but has now been weaned off. Goal BP < 140. Repeat head CT is stable to improved. No neurosurgical intervention at this time.         Interval history / Subjective:   More confused/drowsy this AM, no acute events overnight     Assessment & Plan:     Traumatic Subdural Hematoma w/ SAH   - small L frontal and small L parietal   - BP goal < 140   - continue metoprolol  - holding aspirin   - repeat had CT shows stability   - repeat CT for any changes in neuro exam - ordered  AM for worsening mental state  - q4 hour neuro checks   - neurosurgery following, will need repeat head CT in 4-6 weeks to ensure no development of chronic SDH     Ground Level Fall  - uses walker at baseline, mobility status is unclear   - PT/OT consulted   - place on fall precautions      Subclinical Hyperthyroidism   - continue outpatient dose of methimazole      Hx of HTN:  - no medications listed in prior to admission list  - weaned off of cardene this morning    - started on Metoprolol 25 mg XL      Hx Hyperlipidemia  - continue Lipitor      Memory impairment: does NOT have known dementia  -continue supportive care, mental state improving  -some AMS/ acute encephalopathy here due to #1     Code status: full  Prophylaxis: scds  Care Plan discussed with: 
        [facility]                                                                                          Hospitalist Progress Note  Rios Avitia MD  Office Phone: [phone]        Date of Service:  2024  NAME:  Neftali Henderson Jr  :  1935  MRN:  [id number]       Admission Summary:   89 y.o. male with a past medical history of HTN, BPH, Graves Disease, CKD3 and frequent falls who came to [facility] as a transfer from [facility] following an unwitnessed GLF. CT head at [facility] showed small left frontal SDH and small SAH over right parietal. Neurosurgery was consulted and recommended transfer to [facility] ICU. He hypertensive initially requiring a cardene gtt but has now been weaned off. Goal BP < 140. Repeat head CT is stable to improved. No neurosurgical intervention at this time.         Interval history / Subjective:   More lucid and appropriate this AM, family at bedside, they tell me he was never diagnosed with dementia (never had testing) and is normally very lucid and appropriate.     Assessment & Plan:     Traumatic Subdural Hematoma w/ SAH   - small L frontal and small L parietal   - BP goal < 140   - continue metoprolol  - holding aspirin   - repeat had CT shows stability   - repeat CT for any changes in neuro exam   - q4 hour neuro checks   - neurosurgery following, will need repeat head CT in 4-6 weeks to ensure no development of chronic SDH     Ground Level Fall  - uses walker at baseline, mobility status is unclear   - PT/OT consulted   - place on fall precautions      Subclinical Hyperthyroidism   - continue outpatient dose of methimazole      Hx of HTN:  - no medications listed in prior to admission list  - weaned off of cardene this morning    - started on Metoprolol 25 mg XL      Hx Hyperlipidemia  - continue Lipitor      Memory impairment: does NOT have known dementia  -continue supportive care, mental state improving  -some AMS/ acute encephalopathy here due to 
        [facility] [place] [facility]                                                                                          Hospitalist Progress Note  Rios Avitia MD  Office Phone: [phone]        Date of Service:  5/10/2024  NAME:  Neftali Henderson Jr  :  1935  MRN:  [id number]       Admission Summary:   89 y.o. male with a past medical history of dementia, HTN, BPH, Graves Disease, CKD3 and frequent falls who came to [facility] as a transfer from [facility] following an unwitnessed GLF. CT head at [facility] showed small left frontal SDH and small SAH over right parietal. Neurosurgery was consulted and recommended transfer to [facility] ICU. He hypertensive initially requiring a cardene gtt but has now been weaned off. Goal BP < 140. Repeat head CT is stable to improved. No neurosurgical intervention at this time.         Interval history / Subjective:   No acute events overnight, sitter at bedside states he was attempting to get out of bed, pt is asleep this AM, limited historian.     Assessment & Plan:     Traumatic Subdural Hematoma w/ SAH   - small L frontal and small L parietal   - BP goal < 140   - no outpatient BP meds noted on med recc, starting on metoprolol   - holding aspirin   - repeat had CT shows stability   - repeat CT for any changes in neuro exam   - q4 hour neuro checks   - neurosurgery following, will need repeat head CT in 4-6 weeks to ensure no development of chronic SDH     Ground Level Fall  - uses walker at baseline, mobility status is unclear   - PT/OT consulted  - may need SNF vs Rehab at discharge pending evals   - place on fall precautions      Subclinical Hyperthyroidism   - continue outpatient dose of methimazole      Hx of HTN:  - no medications listed in prior to admission list  - weaned off of cardene this morning    - start on Metoprolol 25 mg XL      Hx Hyperlipidemia  - continue Lipitor              Code status: full  Prophylaxis: scds  Care Plan discussed with: 
     Neurosurgery Progress Note            Admit Date: 2024   LOS: 1 day        Daily Progress Note: 2024      Subjective:   The patient has a history of dementia with multiple falls. He takes a baby aspirin daily. He presented to an outside hospital after an unwitnessed fall. His head CT revealed an SDH. Patient transferred to [facility] for further evaluation. Today seen in the ICU. Just worked with therapy.    Objective:     Vital signs  Temp (24hrs), Av.9 °F (36.6 °C), Min:97.8 °F (36.6 °C), Max:98.1 °F (36.7 °C)   No intake/output data recorded.   190 -  07  In: 356.3 [I.V.:356.3]  Out: -     /60   Pulse (!) 102   Temp 98.1 °F (36.7 °C) (Oral)   Resp 16   Wt 75.5 kg (166 lb 7.2 oz)   SpO2 96%   BMI 26.07 kg/m²          Pain control       PT/OT  Gait                 Physical Exam:  Gen:NAD.  Neuro: A&Ox2. Follows commands. Speech clear. Affect flat.  PERRL. EOMI. Face symmetric. Tongue midline.  LINDSEY spontaneously  Negative drift.  Gait deferred.      24 hour results:    Recent Results (from the past 24 hour(s))   EKG 12 Lead (Syncope)    Collection Time: 24  6:13 PM   Result Value Ref Range    Ventricular Rate 93 BPM    Atrial Rate 93 BPM    P-R Interval 148 ms    QRS Duration 80 ms    Q-T Interval 362 ms    QTc Calculation (Bazett) 450 ms    P Axis 43 degrees    R Axis -19 degrees    T Axis 34 degrees    Diagnosis       Normal sinus rhythm  Moderate voltage criteria for LVH, may be normal variant  Borderline ECG  When compared with ECG of 04-MAY-2021 06:14,  No significant change was found     CBC with Auto Differential    Collection Time: 24  6:23 PM   Result Value Ref Range    WBC 8.5 4.1 - 11.1 K/uL    RBC 4.33 4.10 - 5.70 M/uL    Hemoglobin 14.1 12.1 - 17.0 g/dL    Hematocrit 40.1 36.6 - 50.3 %    MCV 92.6 80.0 - 99.0 FL    MCH 32.6 26.0 - 34.0 PG    MCHC 35.2 30.0 - 36.5 g/dL    RDW 12.4 11.5 - 14.5 %    Platelets 198 150 - 400 K/uL    MPV 9.5 8.9 - 12.9 FL    
  ICH Documentation Note     Brief HPI: 89yo m s/p unwitnessed GLF. Hx off dementia. Not able to provide details about the fall and preceding events.   Wife found patient down in garage, stellate laceration to right occiput. HCT with small L frontal SDH and small SAH over R parietal. Hypertensive at sending. Nicardipine gtt initiated.     Medical hx  HTN, BPH, Dementia, HLD, Graves disease, frequent falls, CKD3, tinnitis    Traumatic ICH?   YES    ICH Score ON   ARRIVAL:    [place]/Modified [place] on arrival (if indicated):      ICH 1   [place] 1, [place] Grade II    Imaging:   CT Result (most recent):  CT HEAD WO CONTRAST 05/08/2024    Narrative  EXAM: CT HEAD WO CONTRAST    INDICATION: fall    COMPARISON: 1/11/2018.    CONTRAST: None.    TECHNIQUE: Unenhanced CT of the head was performed using 5 mm images. Brain and  bone windows were generated. Coronal and sagittal reformats. CT dose reduction  was achieved through use of a standardized protocol tailored for this  examination and automatic exposure control for dose modulation.    FINDINGS:  The ventricles and sulci are normal in size, shape and configuration. Mild to  moderate low-density in the periventricular white matter. Small subdural  hematoma overlying the left frontal lobe. Small amount of subarachnoid  hemorrhage overlying the right parietal lobe. No midline shift the basilar  cisterns are open. No CT evidence of acute infarct.    The bone windows demonstrate no abnormalities. The visualized portions of the  paranasal sinuses and mastoid air cells are clear. Right posterior scalp  hematoma    Impression  1. Small subdural hematoma overlying the left frontal lobe. Small subarachnoid  hemorrhage overlying the right parietal lobe.    2. Scalp hematoma    Findings discussed with Dr. Romero at 1850 hours       Plan:   NSGY consult   SBP goal < 140     Time spent: 10 minutes.     Emely Murrell, APRN - CNP  Neurocritical Care Nurse 
  Physician Progress Note      PATIENT:               HANNAH ZAVALA JR  [facility] #:                  [id number]  :                       1935  ADMIT DATE:       2024 11:47 PM  DISCH DATE:  RESPONDING  PROVIDER #:        Rios Avitia MD        QUERY TEXT:    Type of Encephalopathy: Please provide further specificity, if known.    Clinical indicators include: ams, acute, encephalopathy, alcohol use, alcohol  Options provided:  -- Anoxic/hypoxic encephalopathy  -- Metabolic encephalopathy  -- Toxic encephalopathy  -- Hepatic encephalopathy  -- Hypertensive encephalopathy  -- Other - I will add my own diagnosis  -- Disagree - Not applicable / Not valid  -- Disagree - Clinically Unable to determine / Unknown        PROVIDER RESPONSE TEXT:    The patient has metabolic encephalopathy.          QUERY TEXT:    Pt admitted with Traumatic Subdural Hematoma w/ SAH.  Pt noted to have HTN. If   possible, please document in progress notes and discharge summary if you are   evaluating and/or treating any of the following:    The medical record reflects the following:  Risk Factors: 89 year old male, confusion, CKD 3  Clinical Indicators:  H&P - Hx of HTN:  - no medications listed in prior to admission list  - weaned off of cardene this morning  - start on Metoprolol 25 mg XL  BP: 193/99,  182/160, 206/194, 234/208  Treatment: IV Hydralazine, IV Labetalol, IV Nicardipine    Hypertensive Urgency: Defined as SBP of >180 OR DBP >120 w/o associated organ   damage. S/s may or may not be present, but can include severe headache, SOB,   epistaxis, severe anxiety. Tx: adjustment of oral BP medication; IV meds not   usually required.  Hypertensive Emergency: SBP of >180 OR DBP >120 w/ associated organ damage   (CVA, MI, acute CHF, DAYA, encephalopathy, pulmonary edema, and unstable   angina). Documentation should include specific organ affected.  Requires   immediate treatment, usually with IV meds.  Hypertensive Crisis, 
1115: Spoke with pt's spouse calling from [facility], made aware of his transfer to [facility]. Spouse's phone updated and daughter's contact info added. Spouse updated on pt condition.  
2000 Received report from [facility].  2045 , prn hydralazine given.  0200 , prn hydralazine given.  0400 , prn labetolol given.  0630 , prn hydralazine given.  0730 Bedside and Verbal shift change report given to Claudine (oncoming nurse) by Carmen (offgoing nurse). Report included the following information Nurse Handoff Report, Adult Overview, Intake/Output, MAR, and Recent Results.    
Called report to [facility], spoke with KYLEE Peña.  Went over pt hx, imaging results, recent VS, medications and ambulation status.  Opportunity for questions and clarification provided.  Pt traveling via [facility], ETA 1900.  
I have reviewed and agree with Zara Willams's documentation.  
I have reviewed and agree with Zara Willams's documentation.  
Orders received, chart reviewed and patient evaluated by physical therapy. Pending progression with skilled acute physical therapy, recommend:  Therapy 3 hours/day 5-7 days/week    Recommend with nursing bed in chair position 3x/day. Thank you for completing as able in order to maintain patient strength, endurance and independence.     Full evaluation to follow.      Thank you,  Yelena Merchant, PT, DPT, NCS    
Year: No     Number of Places Lived in the Last Year: Not on file     Unstable Housing in the Last Year: No   Interpersonal Safety: Not At Risk (5/8/2024)    Interpersonal Safety Domain Source: IP Abuse Screening     Physical abuse: Denies     Verbal abuse: Denies     Emotional abuse: Denies     Financial abuse: Denies     Sexual abuse: Denies   Utilities: Not At Risk (11/21/2023)    [facility] Utilities     Threatened with loss of utilities: No       Review of Systems:   Review of systems not obtained due to patient factors.       Vital Signs:    Last 24hrs VS reviewed since prior progress note. Most recent are:  Vitals:    05/11/24 1200   BP:    Pulse: (!) 114   Resp:    Temp:    SpO2:          Intake/Output Summary (Last 24 hours) at 5/11/2024 1343  Last data filed at 5/11/2024 0600  Gross per 24 hour   Intake 240 ml   Output 700 ml   Net -460 ml          Physical Examination:     I had a face to face encounter with this patient and independently examined them on 5/11/2024 as outlined below:          General : nad  HEENT: anicteric sclerae  Neck: supple, no JVD, no meningeal signs  Chest: Clear to auscultation bilaterally   CVS: S1 S2 heard, Capillary refill less than 2 seconds  Abd: soft/ non tender, non distended, BS physiological,   Ext: no clubbing, no cyanosis, no edema  Neuro/Psych: grossly non-focal calm this am  Skin: warm     Data Review:    Review and/or order of clinical lab test  Review and/or order of tests in the radiology section of CPT  Review and/or order of tests in the medicine section of CPT      I have personally and independently reviewed all pertinent labs, diagnostic studies, imaging, and have provided independent interpretation of the same.     Labs:     Recent Labs     05/09/24  0029 05/11/24  0548   WBC 7.7 6.8   HGB 13.4 12.5   HCT 38.7 35.7*    176       Recent Labs     05/08/24  1823 05/09/24  0029 05/11/24  0548    141 137   K 3.9 4.2 3.5    108 106   CO2 27 25 24   BUN

## 2024-05-13 NOTE — PLAN OF CARE
Problem: Discharge Planning  Goal: Discharge to home or other facility with appropriate resources  5/10/2024 1258 by Zara Willams, RN  Outcome: Progressing  5/10/2024 0932 by Rowena Ramos RN  Outcome: Progressing  Flowsheets (Taken 5/10/2024 0800 by Zara Willams, RN)  Discharge to home or other facility with appropriate resources:   Identify barriers to discharge with patient and caregiver   Arrange for needed discharge resources and transportation as appropriate   Arrange for interpreters to assist at discharge as needed   Identify discharge learning needs (meds, wound care, etc)   Refer to discharge planning if patient needs post-hospital services based on physician order or complex needs related to functional status, cognitive ability or social support system     Problem: Safety - Adult  Goal: Free from fall injury  5/10/2024 1258 by Zara Willams RN  Outcome: Progressing  5/10/2024 0932 by Rowena Ramos RN  Outcome: Progressing  Flowsheets (Taken 5/10/2024 0800 by Zara Willams, RN)  Free From Fall Injury:   Instruct family/caregiver on patient safety   Based on caregiver fall risk screen, instruct family/caregiver to ask for assistance with transferring infant if caregiver noted to have fall risk factors     Problem: Occupational Therapy - Adult  Goal: By Discharge: Performs self-care activities at highest level of function for planned discharge setting.  See evaluation for individualized goals.  Description: FUNCTIONAL STATUS PRIOR TO ADMISSION:  Patient is limited historian, per chart has hx of dementia and fell walking in his garage resulting in admission.      HOME SUPPORT: Patient lived with spouse.     Occupational Therapy Goals  Initiated 5/9/2024   1.  Patient will perform upper body dressing with Moderate Assist within 7 day(s).  2.  Patient will perform lower body dressing with Moderate Assist within 7 day(s).  3.  Patient will perform seated bathing with Moderate Assist 
  Problem: Discharge Planning  Goal: Discharge to home or other facility with appropriate resources  5/12/2024 1234 by Laura Ramos RN  Outcome: Progressing  Flowsheets (Taken 5/12/2024 0800)  Discharge to home or other facility with appropriate resources:   Identify barriers to discharge with patient and caregiver   Arrange for needed discharge resources and transportation as appropriate   Identify discharge learning needs (meds, wound care, etc)  5/12/2024 0325 by Adore Villafana, RN  Outcome: Progressing  Flowsheets (Taken 5/11/2024 2020)  Discharge to home or other facility with appropriate resources:   Identify barriers to discharge with patient and caregiver   Arrange for needed discharge resources and transportation as appropriate   Identify discharge learning needs (meds, wound care, etc)     Problem: Safety - Adult  Goal: Free from fall injury  5/12/2024 1234 by Laura Ramos RN  Outcome: Progressing  Flowsheets (Taken 5/12/2024 0800)  Free From Fall Injury: Instruct family/caregiver on patient safety  5/12/2024 0325 by Adore Villafana RN  Outcome: Progressing     Problem: Skin/Tissue Integrity  Goal: Absence of new skin breakdown  Description: 1.  Monitor for areas of redness and/or skin breakdown  2.  Assess vascular access sites hourly  3.  Every 4-6 hours minimum:  Change oxygen saturation probe site  4.  Every 4-6 hours:  If on nasal continuous positive airway pressure, respiratory therapy assess nares and determine need for appliance change or resting period.  5/12/2024 1234 by Laura Ramos RN  Outcome: Progressing  5/12/2024 0325 by Adore Villafana RN  Outcome: Progressing     Problem: Confusion  Goal: Confusion, delirium, dementia, or psychosis is improved or at baseline  Description: INTERVENTIONS:  1. Assess for possible contributors to thought disturbance, including medications, impaired vision or hearing, underlying metabolic abnormalities, dehydration, psychiatric 
  Problem: Discharge Planning  Goal: Discharge to home or other facility with appropriate resources  5/13/2024 0904 by Claudine Fonseca RN  Outcome: Progressing  Flowsheets (Taken 5/13/2024 0800)  Discharge to home or other facility with appropriate resources: Identify barriers to discharge with patient and caregiver  5/13/2024 0521 by Carmen White RN  Outcome: Progressing  5/12/2024 2230 by Carmen White RN  Outcome: Progressing  Flowsheets (Taken 5/12/2024 2000)  Discharge to home or other facility with appropriate resources: Arrange for interpreters to assist at discharge as needed     Problem: Safety - Adult  Goal: Free from fall injury  5/13/2024 0904 by Claudine Fonseca RN  Outcome: Progressing  5/13/2024 0521 by Carmen White RN  Outcome: Progressing  5/12/2024 2230 by Carmen White RN  Outcome: Progressing     Problem: Skin/Tissue Integrity  Goal: Absence of new skin breakdown  Description: 1.  Monitor for areas of redness and/or skin breakdown  2.  Assess vascular access sites hourly  3.  Every 4-6 hours minimum:  Change oxygen saturation probe site  4.  Every 4-6 hours:  If on nasal continuous positive airway pressure, respiratory therapy assess nares and determine need for appliance change or resting period.  5/13/2024 0904 by Claudine Fonseca RN  Outcome: Progressing  5/13/2024 0521 by Carmen White RN  Outcome: Progressing  5/12/2024 2230 by Carmen White RN  Outcome: Progressing     Problem: Confusion  Goal: Confusion, delirium, dementia, or psychosis is improved or at baseline  Description: INTERVENTIONS:  1. Assess for possible contributors to thought disturbance, including medications, impaired vision or hearing, underlying metabolic abnormalities, dehydration, psychiatric diagnoses, and notify attending LIP  2. [place] high risk fall precautions, as indicated  3. Provide frequent short contacts to provide reality reorientation, refocusing and direction  4. Decrease 
  Problem: Discharge Planning  Goal: Discharge to home or other facility with appropriate resources  5/13/2024 1623 by Claudine Fonseca RN  Outcome: Adequate for Discharge  5/13/2024 0904 by Claudine Fonseca RN  Outcome: Progressing  Flowsheets (Taken 5/13/2024 0800)  Discharge to home or other facility with appropriate resources: Identify barriers to discharge with patient and caregiver  5/13/2024 0521 by Carmen White RN  Outcome: Progressing     Problem: Safety - Adult  Goal: Free from fall injury  5/13/2024 1623 by Claudine Fonseca RN  Outcome: Adequate for Discharge  5/13/2024 0904 by Claudine Fonseca RN  Outcome: Progressing  5/13/2024 0521 by Carmen White RN  Outcome: Progressing     Problem: Skin/Tissue Integrity  Goal: Absence of new skin breakdown  Description: 1.  Monitor for areas of redness and/or skin breakdown  2.  Assess vascular access sites hourly  3.  Every 4-6 hours minimum:  Change oxygen saturation probe site  4.  Every 4-6 hours:  If on nasal continuous positive airway pressure, respiratory therapy assess nares and determine need for appliance change or resting period.  5/13/2024 1623 by Claudine Fonseca RN  Outcome: Adequate for Discharge  5/13/2024 0904 by Claudine Fonseca RN  Outcome: Progressing  5/13/2024 0521 by Carmen White RN  Outcome: Progressing     Problem: Confusion  Goal: Confusion, delirium, dementia, or psychosis is improved or at baseline  Description: INTERVENTIONS:  1. Assess for possible contributors to thought disturbance, including medications, impaired vision or hearing, underlying metabolic abnormalities, dehydration, psychiatric diagnoses, and notify attending LIP  2. [place] high risk fall precautions, as indicated  3. Provide frequent short contacts to provide reality reorientation, refocusing and direction  4. Decrease environmental stimuli, including noise as appropriate  5. Monitor and intervene to maintain adequate nutrition, hydration, 
  Problem: Discharge Planning  Goal: Discharge to home or other facility with appropriate resources  Outcome: Progressing     Problem: Safety - Adult  Goal: Free from fall injury  Outcome: Progressing     Problem: Skin/Tissue Integrity  Goal: Absence of new skin breakdown  Description: 1.  Monitor for areas of redness and/or skin breakdown  2.  Assess vascular access sites hourly  3.  Every 4-6 hours minimum:  Change oxygen saturation probe site  4.  Every 4-6 hours:  If on nasal continuous positive airway pressure, respiratory therapy assess nares and determine need for appliance change or resting period.  Outcome: Progressing     Problem: Confusion  Goal: Confusion, delirium, dementia, or psychosis is improved or at baseline  Description: INTERVENTIONS:  1. Assess for possible contributors to thought disturbance, including medications, impaired vision or hearing, underlying metabolic abnormalities, dehydration, psychiatric diagnoses, and notify attending LIP  2. [place] high risk fall precautions, as indicated  3. Provide frequent short contacts to provide reality reorientation, refocusing and direction  4. Decrease environmental stimuli, including noise as appropriate  5. Monitor and intervene to maintain adequate nutrition, hydration, elimination, sleep and activity  6. If unable to ensure safety without constant attention obtain sitter and review sitter guidelines with assigned personnel  7. Initiate Psychosocial CNS and Spiritual Care consult, as indicated  Outcome: Progressing     
  Problem: Discharge Planning  Goal: Discharge to home or other facility with appropriate resources  Outcome: Progressing  Flowsheets (Taken 5/11/2024 0800)  Discharge to home or other facility with appropriate resources:   Identify barriers to discharge with patient and caregiver   Arrange for needed discharge resources and transportation as appropriate   Identify discharge learning needs (meds, wound care, etc)     Problem: Safety - Adult  Goal: Free from fall injury  Outcome: Progressing  Flowsheets (Taken 5/11/2024 0800)  Free From Fall Injury: Instruct family/caregiver on patient safety     Problem: Skin/Tissue Integrity  Goal: Absence of new skin breakdown  Description: 1.  Monitor for areas of redness and/or skin breakdown  2.  Assess vascular access sites hourly  3.  Every 4-6 hours minimum:  Change oxygen saturation probe site  4.  Every 4-6 hours:  If on nasal continuous positive airway pressure, respiratory therapy assess nares and determine need for appliance change or resting period.  Outcome: Progressing     Problem: Confusion  Goal: Confusion, delirium, dementia, or psychosis is improved or at baseline  Description: INTERVENTIONS:  1. Assess for possible contributors to thought disturbance, including medications, impaired vision or hearing, underlying metabolic abnormalities, dehydration, psychiatric diagnoses, and notify attending LIP  2. [place] high risk fall precautions, as indicated  3. Provide frequent short contacts to provide reality reorientation, refocusing and direction  4. Decrease environmental stimuli, including noise as appropriate  5. Monitor and intervene to maintain adequate nutrition, hydration, elimination, sleep and activity  6. If unable to ensure safety without constant attention obtain sitter and review sitter guidelines with assigned personnel  7. Initiate Psychosocial CNS and Spiritual Care consult, as indicated  Outcome: Progressing  Flowsheets (Taken 5/11/2024 
  Problem: Discharge Planning  Goal: Discharge to home or other facility with appropriate resources  Outcome: Progressing  Flowsheets (Taken 5/11/2024 2020)  Discharge to home or other facility with appropriate resources:   Identify barriers to discharge with patient and caregiver   Arrange for needed discharge resources and transportation as appropriate   Identify discharge learning needs (meds, wound care, etc)     Problem: Safety - Adult  Goal: Free from fall injury  Outcome: Progressing     Problem: Skin/Tissue Integrity  Goal: Absence of new skin breakdown  Description: 1.  Monitor for areas of redness and/or skin breakdown  2.  Assess vascular access sites hourly  3.  Every 4-6 hours minimum:  Change oxygen saturation probe site  4.  Every 4-6 hours:  If on nasal continuous positive airway pressure, respiratory therapy assess nares and determine need for appliance change or resting period.  Outcome: Progressing     Problem: Confusion  Goal: Confusion, delirium, dementia, or psychosis is improved or at baseline  Description: INTERVENTIONS:  1. Assess for possible contributors to thought disturbance, including medications, impaired vision or hearing, underlying metabolic abnormalities, dehydration, psychiatric diagnoses, and notify attending LIP  2. [place] high risk fall precautions, as indicated  3. Provide frequent short contacts to provide reality reorientation, refocusing and direction  4. Decrease environmental stimuli, including noise as appropriate  5. Monitor and intervene to maintain adequate nutrition, hydration, elimination, sleep and activity  6. If unable to ensure safety without constant attention obtain sitter and review sitter guidelines with assigned personnel  7. Initiate Psychosocial CNS and Spiritual Care consult, as indicated  Outcome: Progressing  Flowsheets (Taken 5/11/2024 2020)  Effect of thought disturbance (confusion, delirium, dementia, or psychosis) are managed with adequate 
  Problem: Occupational Therapy - Adult  Goal: By Discharge: Performs self-care activities at highest level of function for planned discharge setting.  See evaluation for individualized goals.  Description: FUNCTIONAL STATUS PRIOR TO ADMISSION:  Patient is limited historian, per chart has hx of dementia and fell walking in his garage resulting in admission.      HOME SUPPORT: Patient lived with spouse.     Occupational Therapy Goals  Initiated 5/9/2024   1.  Patient will perform upper body dressing with Moderate Assist within 7 day(s).  2.  Patient will perform lower body dressing with Moderate Assist within 7 day(s).  3.  Patient will perform seated bathing with Moderate Assist within 7 day(s).  4.  Patient will perform toilet transfers with Moderate Assist within 7 day(s).  5.  Patient will perform all aspects of toileting with Moderate Assist within 7 day(s).  6.  Patient will participate in upper extremity therapeutic exercise/activities with Minimal Assist within 7 day(s).    7.  Patient will utilize energy conservation techniques during functional activities with verbal, visual, and tactile cues within 7 day(s).  8.  Patient will complete and improve their Fugl Mary score by 5 points in prep for ADLs within 7 days.   Outcome: Progressing   OCCUPATIONAL THERAPY TREATMENT  Patient: Neftali Henderson Jr (89 y.o. male)  Date: 5/13/2024  Primary Diagnosis: Subdural hematoma (HCC) [S06.5XAA]       Precautions: Fall Risk (SBP < 140)                Chart, occupational therapy assessment, plan of care, and goals were reviewed.    ASSESSMENT  Patient continues to benefit from skilled OT services and is progressing towards goals. Patient making excellent gains in completing standing ADLs at sink with up to min A to maintain standing balance at miracle ste. For initial sit to stand transfers Neftali did require up to min A x2/mod A x1 to ascend, however progressing to min A with repetition and pulling from cross bar of miracle 
  Problem: Occupational Therapy - Adult  Goal: By Discharge: Performs self-care activities at highest level of function for planned discharge setting.  See evaluation for individualized goals.  Description: FUNCTIONAL STATUS PRIOR TO ADMISSION:  Patient is limited historian, per chart has hx of dementia and fell walking in his garage resulting in admission.      HOME SUPPORT: Patient lived with spouse.     Occupational Therapy Goals  Initiated 5/9/2024   1.  Patient will perform upper body dressing with Moderate Assist within 7 day(s).  2.  Patient will perform lower body dressing with Moderate Assist within 7 day(s).  3.  Patient will perform seated bathing with Moderate Assist within 7 day(s).  4.  Patient will perform toilet transfers with Moderate Assist within 7 day(s).  5.  Patient will perform all aspects of toileting with Moderate Assist within 7 day(s).  6.  Patient will participate in upper extremity therapeutic exercise/activities with Minimal Assist within 7 day(s).    7.  Patient will utilize energy conservation techniques during functional activities with verbal, visual, and tactile cues within 7 day(s).  8.  Patient will complete and improve their Fugl Patterson score by 5 points in prep for ADLs within 7 days.   5/10/2024 1256 by Mercedes Traore OT  Outcome: Progressing   OCCUPATIONAL THERAPY TREATMENT  Patient: Neftali Henderson  (89 y.o. male)  Date: 5/10/2024  Primary Diagnosis: Subdural hematoma (HCC) [S06.5XAA]       Precautions: Fall Risk (SBP < 140)    Chart, occupational therapy assessment, plan of care, and goals were reviewed.    ASSESSMENT  Patient continues to benefit from skilled OT services and is progressing towards goals. Reattempted fugl patterson and MMT of UE on this date however patient unable to properly sequence through movements and/or attend to task despite multimodal cueing and increased time. Patient with minimal and inconsistent response to painful stimuli on LUE/LLE. Patient more 
  Problem: Physical Therapy - Adult  Goal: By Discharge: Performs mobility at highest level of function for planned discharge setting.  See evaluation for individualized goals.  Description: FUNCTIONAL STATUS PRIOR TO ADMISSION: Patient is a limited historian. Per EMR fell at home while in his garage, presumably he is ambulatory at baseline.    HOME SUPPORT PRIOR TO ADMISSION: The patient lived with his wife.    Physical Therapy Goals  Initiated 5/9/2024  1.  Patient will move from supine to sit and sit to supine, scoot up and down, and roll side to side in bed with minimal assistance within 7 day(s).    2.  Patient will perform sit to stand with moderate assistance within 7 day(s).  3.  Patient will transfer from bed to chair and chair to bed with moderate assistance using the least restrictive device within 7 day(s).  4.  Patient will ambulate with moderate assistance for 25 feet with the least restrictive device within 7 day(s).   5.  Patient will improve Crowe Balance score by 7 points within 7 days.     Outcome: Progressing   PHYSICAL THERAPY EVALUATION    Patient: Neftali Henderson Jr (89 y.o. male)  Date: 5/9/2024  Primary Diagnosis: Subdural hematoma (HCC) [S06.5XAA]       Precautions: Restrictions/Precautions: Fall Risk (SBP < 140)                      ASSESSMENT :   DEFICITS/IMPAIRMENTS:   The patient is limited by decreased functional mobility, independence in ADLs, high-level IADLs, ROM, strength, body mechanics, activity tolerance, safety awareness, cognition, command following, attention/concentration, coordination, balance, increased pain levels in his lumbar region. Patient is currently admitted after a ground level fall at home in his garage with imaging significant for small left frontal SDH and small SAH over right parietal lobe.     Based on the impairments listed above patient is below his functional baseline. Patient is a very limited historian due to impaired cognition (oriented to self & place 
  Problem: Physical Therapy - Adult  Goal: By Discharge: Performs mobility at highest level of function for planned discharge setting.  See evaluation for individualized goals.  Description: FUNCTIONAL STATUS PRIOR TO ADMISSION: Patient is a limited historian. Per EMR fell at home while in his garage, presumably he is ambulatory at baseline.    HOME SUPPORT PRIOR TO ADMISSION: The patient lived with his wife.    Physical Therapy Goals  Initiated 5/9/2024  1.  Patient will move from supine to sit and sit to supine, scoot up and down, and roll side to side in bed with minimal assistance within 7 day(s).    2.  Patient will perform sit to stand with moderate assistance within 7 day(s).  3.  Patient will transfer from bed to chair and chair to bed with moderate assistance using the least restrictive device within 7 day(s).  4.  Patient will ambulate with moderate assistance for 25 feet with the least restrictive device within 7 day(s).   5.  Patient will improve Crowe Balance score by 7 points within 7 days.     Outcome: Progressing   PHYSICAL THERAPY TREATMENT    Patient: Neftali Henderson  (89 y.o. male)  Date: 5/10/2024  Diagnosis: Subdural hematoma (HCC) [S06.5XAA] Subdural hematoma (HCC)      Precautions: Fall Risk (SBP < 140)                      ASSESSMENT:  Patient continues to benefit from skilled PT services and is progressing towards goals. Neftali tolerated today's session fairly. He was received in bed, perseverative on need to use bathroom. He required up to Max A x 2 for bed mobility. He also required Max A x 2 for bed <> bedside commode with B HHA and demonstrated poor motor control, coordination, and sequencing of L LE. He was returned to bed with all needs met & sitter at bedside.        PLAN:  Patient continues to benefit from skilled intervention to address the above impairments.  Continue treatment per established plan of care.    Recommend with staff: therapy recommendations for staff: Recommend 
  Problem: Physical Therapy - Adult  Goal: By Discharge: Performs mobility at highest level of function for planned discharge setting.  See evaluation for individualized goals.  Description: FUNCTIONAL STATUS PRIOR TO ADMISSION: Patient is a limited historian. Per EMR fell at home while in his garage, presumably he is ambulatory at baseline.    HOME SUPPORT PRIOR TO ADMISSION: The patient lived with his wife.    Physical Therapy Goals  Initiated 5/9/2024  1.  Patient will move from supine to sit and sit to supine, scoot up and down, and roll side to side in bed with minimal assistance within 7 day(s).    2.  Patient will perform sit to stand with moderate assistance within 7 day(s).  3.  Patient will transfer from bed to chair and chair to bed with moderate assistance using the least restrictive device within 7 day(s).  4.  Patient will ambulate with moderate assistance for 25 feet with the least restrictive device within 7 day(s).   5.  Patient will improve Crowe Balance score by 7 points within 7 days.     Outcome: Progressing   PHYSICAL THERAPY TREATMENT    Patient: Neftali Henderson  (89 y.o. male)  Date: 5/13/2024  Diagnosis: Subdural hematoma (HCC) [S06.5XAA] Subdural hematoma (HCC)      Precautions: Fall Risk (SBP < 140)                      ASSESSMENT:  Patient continues to benefit from skilled PT services and is progressing towards goals. Neftali tolerated today's session fairly. He was received in bed with family at bedside. He required Mod A for bed mobility and demonstrated fair sitting balance while EOB. He completed sit <> stand via Eleni Stedy with Max A and was able to participate in standing activity at sink for several minutes while completing self care/grooming. He also completed marching in Eleni [place] but required significant cueing for sequencing & safety for activity. He reported lightheadedness prior to returning to bedside chair, but symptoms resolved and BP stable. Patient was left up in chair 
Speech LAnguage Pathology EVALUATION    Patient: Neftali Henderson Jr (89 y.o. male)  Date: 5/9/2024  Primary Diagnosis: Subdural hematoma (HCC) [S06.5XAA]       Precautions:  Fall Risk (SBP < 140)                  ASSESSMENT :  Patient presents with very mild oral phase dysphagia symptoms characterized by disorganized and prolonged, however complete, mastication harder to chew solids with piecemeal deglutition and mild lingual residue following initial swallow response. No pharyngeal dysphagia noted all trials with no overt s/sx aspiration or penetration observed. Despite mild oral phase dysphagia, patient appears safe to continue with regular textured solids and thin liquids with use of safe swallowing strategies - small bites/sips and alternating liquids and solids to clear mild residue. Patient may benefit from cognitive/linguistic evaluation however exhibited persistent sleepiness following BSE and unable to participate at time of BSE. Unsure of patient baseline cognitive status; will f/u with family to determine.     Patient will benefit from skilled intervention to address the above impairments.     PLAN :  Recommendations and Planned Interventions:  Diet: Regular and thin liquids  Straws ok  Alternate liquids/solids  Small bites/sips  Slow rate     Recommend next SLP session: Dysphagia tx, cognitive assessment as indicated    Acute SLP Services: Yes, patient will be followed by speech-language pathology 2x/week to address goals. Patient's rehabilitation potential is considered to be Fair.  Discharge Recommendations: Continue to assess pending progress     SUBJECTIVE:   Patient stated, “I live with my Mom and my wife.”    OBJECTIVE:     Past Medical History:   Diagnosis Date    Acute gouty arthropathy     Benign hypertensive heart disease without heart failure     Hypertrophy of prostate without urinary obstruction and other lower urinary tract symptoms (LUTS)     Memory difficulties     Other and unspecified 
perform upper body dressing with Moderate Assist within 7 day(s).  2.  Patient will perform lower body dressing with Moderate Assist within 7 day(s).  3.  Patient will perform seated bathing with Moderate Assist within 7 day(s).  4.  Patient will perform toilet transfers with Moderate Assist within 7 day(s).  5.  Patient will perform all aspects of toileting with Moderate Assist within 7 day(s).  6.  Patient will participate in upper extremity therapeutic exercise/activities with Minimal Assist within 7 day(s).    7.  Patient will utilize energy conservation techniques during functional activities with verbal, visual, and tactile cues within 7 day(s).  8.  Patient will complete and improve their Fugl Mary score by 5 points in prep for ADLs within 7 days.   5/9/2024 1255 by Mercedes Traore, OT  Outcome: Progressing     Problem: Physical Therapy - Adult  Goal: By Discharge: Performs mobility at highest level of function for planned discharge setting.  See evaluation for individualized goals.  Description: FUNCTIONAL STATUS PRIOR TO ADMISSION: Patient is a limited historian. Per EMR fell at home while in his garage, presumably he is ambulatory at baseline.    HOME SUPPORT PRIOR TO ADMISSION: The patient lived with his wife.    Physical Therapy Goals  Initiated 5/9/2024  1.  Patient will move from supine to sit and sit to supine, scoot up and down, and roll side to side in bed with minimal assistance within 7 day(s).    2.  Patient will perform sit to stand with moderate assistance within 7 day(s).  3.  Patient will transfer from bed to chair and chair to bed with moderate assistance using the least restrictive device within 7 day(s).  4.  Patient will ambulate with moderate assistance for 25 feet with the least restrictive device within 7 day(s).   5.  Patient will improve Crowe Balance score by 7 points within 7 days.     5/9/2024 1444 by Yelena Merchant, PT  Outcome: Progressing     Problem: Skin/Tissue Integrity  Goal: 
Generally decreased, functional      Coordination:  Coordination: Generally decreased, functional            Tone & Sensation:   Tone: Normal  Sensation:  (inconsistent, patient with impaired cognition stating \"yes\" to majoirty of questions asked)          Functional Mobility and Transfers for ADLs:    Bed Mobility:     Bed Mobility Training  Bed Mobility Training: Yes  Rolling: Maximum assistance;Assist X2  Supine to Sit: Maximum assistance;Assist X2  Sit to Supine: Minimum assistance;Additional time  Scooting: Maximum assistance;Assist X2    Transfers:      Transfer Training  Transfer Training: No                     Balance:   Standing:  (NT - due to fear of falling, impaired cognition, and increased pain/nausea)  Balance  Sitting: Impaired  Sitting - Static: Poor (constant support) (heavy posterior lean, fearful of falling)  Sitting - Dynamic: Poor (constant support)  Standing:  (NT - due to fear of falling, impaired cognition, and increased pain/nausea)      ADL Assessment:          Feeding: Maximum assistance       Grooming: Maximum assistance; offered grooming tasks however patient declining due to persistent nausea       UE Bathing: Maximum assistance     LE Bathing: Dependent/Total       UE Dressing: Maximum assistance       LE Dressing: Dependent/Total; to don socks supine in bed       Toileting: Maximum assistance       ADL Intervention and task modifications:    Unable to complete Fugl Mary on this date due to impaired cognition and limited sequencing.     [facility] AM-PACTM \"6 Clicks\"                                                       Daily Activity Inpatient Short Form  AM-PAC Daily Activity - Inpatient   How much help is needed for putting on and taking off regular lower body clothing?: A Lot  How much help is needed for bathing (which includes washing, rinsing, drying)?: A Lot  How much help is needed for toileting (which includes using toilet, bedpan, or urinal)?: A Lot  How much help is

## 2024-05-13 NOTE — DISCHARGE SUMMARY
Discharge Summary       PATIENT ID: Neftali Henderson Jr  MRN: [id number]   YOB: 1935    DATE OF ADMISSION: 5/8/2024 11:47 PM    DATE OF DISCHARGE: 5/13/2024   PRIMARY CARE PROVIDER: Zoe Mane APRN - NP     ATTENDING PHYSICIAN: Adore  DISCHARGING PROVIDER: Rios Avitia MD    To contact this individual call [phone] and ask the  to page.  If unavailable ask to be transferred the Adult Hospitalist Department.    CONSULTATIONS: IP CONSULT TO NEUROSURGERY  IP CONSULT TO CASE MANAGEMENT    PROCEDURES/SURGERIES: * No surgery found *     ADMITTING DIAGNOSES & HOSPITAL COURSE:   Traumatic SDH w/ SAH  Fall  History of hyperthyroidism  HTN  Hyperlipidemia  History of memory impairment      89 y.o. male with a past medical history of HTN, BPH, Graves Disease, CKD3 and frequent falls who came to [facility] as a transfer from [facility] following an unwitnessed GLF. CT head at [facility] showed small left frontal SDH and small SAH over right parietal. Neurosurgery was consulted and recommended transfer to [facility] ICU. He hypertensive initially requiring a cardene gtt but has now been weaned off. Goal BP < 140.     -stopped aspirin, repeat CT head 5/13 for somnolence in AM unchanged  -neurosurgery following, will need repeat head CT in 4-6 weeks to ensure no development of chronic SDH         Memory impairment: does NOT have known dementia, but would benefit from testing as outpt when out of rehab    DISCHARGE DIAGNOSES / PLAN:        FOLLOW UP APPOINTMENTS:    Follow-up Information       Follow up With Specialties Details Why Contact Info    Heather Braxton MD Neurosurgery Schedule an appointment as soon as possible for a visit in 1 month(s) for CT head review. Patient to call the office for clinic appointment. Hospital will call patient to schedule head CT prior to clinic appointment. [address]   [address]  [facility] [address] [phone]      Zoe Mane APRN - NP Nurse Practitioner

## 2024-05-13 NOTE — CARE COORDINATION
Transition of Care Plan:    IPR - [facility] - Today  RN to call report to [phone]    Transport: S - Hospital to Home 7pm    RUR: 13%  Prior Level of Functioning: Independent  Disposition: IPR  If SNF or IPR: Date FOC offered: 5/12  Date FOC received: 5/12  Accepting facility: [facility]  Follow up appointments: PCP, Neuro   DME needed: defer to IPR  Transportation at discharge: BLS  IM/IMM Medicare/ letter given: 5/12  Caregiver Contact: Eliana Henderson (Spouse)  [phone] (Mobile)   Discharge Caregiver contacted prior to discharge?   Care Conference needed? None  Barriers to discharge: None    [facility] has accepted Pt, however does not have a bed for Pt available today. Likely DC 5/14 if bed confirmed.    CM will follow.    2:00pm: Per [facility], they can accept Pt after 3pm tomorrow.     3:08pm: [facility] has had a bed open today for Pt after 7pm. CM met with Pt's spouse and dtr at bedside, they are in agreement with discharge plan.     Hospital to home transport scheduled for 7pm; they will bill Pt's Medicare for trip.    LESLIE Velasco (Ally).

## 2024-07-08 ENCOUNTER — TELEPHONE (OUTPATIENT)
Age: 89
End: 2024-07-08

## 2024-07-08 NOTE — TELEPHONE ENCOUNTER
Candy wanted to let Laly know that Eliana found Neftali in the bathroom floor this morning. Jansanya was called but he refused to go to [facility]. He was checked over and didn't find anything wrong. OT started today and PT will start 7-11. Daughter is checking into getting help around the house for them.

## 2024-07-19 DIAGNOSIS — I95.2 HYPOTENSION DUE TO DRUGS: ICD-10-CM

## 2024-07-19 RX ORDER — IRBESARTAN 150 MG/1
150 TABLET ORAL DAILY
Qty: 30 TABLET | Refills: 0 | OUTPATIENT
Start: 2024-07-19

## 2024-07-20 ENCOUNTER — APPOINTMENT (OUTPATIENT)
Facility: HOSPITAL | Age: 89
End: 2024-07-20
Payer: MEDICARE

## 2024-07-20 ENCOUNTER — HOSPITAL ENCOUNTER (EMERGENCY)
Facility: HOSPITAL | Age: 89
Discharge: HOME | End: 2024-07-20
Attending: EMERGENCY MEDICINE
Payer: MEDICARE

## 2024-07-20 VITALS
OXYGEN SATURATION: 98 % | RESPIRATION RATE: 20 BRPM | HEIGHT: 67 IN | BODY MASS INDEX: 25.51 KG/M2 | DIASTOLIC BLOOD PRESSURE: 69 MMHG | WEIGHT: 162.5 LBS | SYSTOLIC BLOOD PRESSURE: 126 MMHG | HEART RATE: 85 BPM | TEMPERATURE: 97.6 F

## 2024-07-20 DIAGNOSIS — M79.89 HAND SWELLING: Primary | ICD-10-CM

## 2024-07-20 PROCEDURE — 73130 X-RAY EXAM OF HAND: CPT

## 2024-07-20 PROCEDURE — 99283 EMERGENCY DEPT VISIT LOW MDM: CPT

## 2024-07-20 ASSESSMENT — PAIN SCALES - GENERAL: PAINLEVEL_OUTOF10: 0

## 2024-07-20 ASSESSMENT — PAIN DESCRIPTION - ORIENTATION: ORIENTATION: LEFT

## 2024-07-20 ASSESSMENT — PAIN DESCRIPTION - LOCATION: LOCATION: HAND

## 2024-07-20 ASSESSMENT — PAIN - FUNCTIONAL ASSESSMENT: PAIN_FUNCTIONAL_ASSESSMENT: 0-10

## 2024-07-20 NOTE — DISCHARGE INSTRUCTIONS
Your hand is swollen.  A lot of different things can cause hand swelling.  Often hands are swollen after a fall or injury.  You had an x-ray performed today that did not show a break.      Hands can also sometimes be swollen because of an infection.  If you notice your hand getting red or you develop a fever, come back to the emergency department immediately.    Another cause of hand swelling is gout.  Usually this is a very painful, red, swollen joint.  Please talk to your doctor about your hand swelling and to see if she has any other recommendations.      While you are at home, try to keep your hand elevated.  Do not use an Ace wrap or bandage as it may exacerbate the finger swelling.    Come back to the emergency department for any new or worsening symptoms.

## 2024-07-20 NOTE — ED PROVIDER NOTES
[facility] EMERGENCY DEP  EMERGENCY DEPARTMENT ENCOUNTER       Pt Name: Neftali Henderson Jr  MRN: [id number]  Birthdate 1935  Date of evaluation: 7/20/2024  Provider: Deena Romero MD   PCP: Zoe Mane APRN - NP  Note Started: 1:52 PM EDT 7/20/24     CHIEF COMPLAINT       Chief Complaint   Patient presents with    Hand Injury     left        HISTORY OF PRESENT ILLNESS: 1 or more elements      History From: Patient, wife, son-in-law.  History limited by: Poor memory     Neftali Henderson Jr is a 89 y.o. male presents to the emergency department complaining of hand swelling.  According to wife, patient has had several falls over the last 2 weeks, which she believes he injured his hand.  He was complaining of hand pain for the last several days and when swelling did not improve over time, family decided to come to ER for further evaluation.       Please See MDM for Additional Details of the HPI/PMH  Nursing Notes were all reviewed and agreed with or any disagreements were addressed in the HPI.     REVIEW OF SYSTEMS        Positives and Pertinent negatives as per HPI.    PAST HISTORY     Past Medical History:  Past Medical History:   Diagnosis Date    Acute gouty arthropathy     Benign hypertensive heart disease without heart failure     Hypertrophy of prostate without urinary obstruction and other lower urinary tract symptoms (LUTS)     Memory difficulties     Other and unspecified hyperlipidemia     Subclinical hyperthyroidism        Past Surgical History:  Past Surgical History:   Procedure Laterality Date    ANKLE FRACTURE SURGERY  05/2020    lawn tractor accident    CATARACT REMOVAL  01/01/2008    Rt    COLONOSCOPY  7/15    Chris, q 10 yrs    HERNIA REPAIR  01/01/1980       Family History:  Family History   Problem Relation Age of Onset    Breast Cancer Sister     Stroke Father     Hypertension Father     Stroke Mother     Hypertension Mother        Social History:  Social History     Tobacco Use

## 2024-07-20 NOTE — ED TRIAGE NOTES
Pt arrived with complaint of left hand swelling,  pt reports he fell a couple of days ago injuring his left hand.  Pt noted with swelling to his left hand and fingers, pt with sensation, pt noted with wedding band on and not being able to get it off.  Pt  noted with swelling of ring finger below the ring, not able to remove in triage.  Pt denies any other injuries.  Pt is awake and alert X 4,  pt and family educated on ER flow

## 2024-07-23 ENCOUNTER — OFFICE VISIT (OUTPATIENT)
Age: 89
End: 2024-07-23
Payer: MEDICARE

## 2024-07-23 VITALS
HEART RATE: 92 BPM | TEMPERATURE: 97.8 F | DIASTOLIC BLOOD PRESSURE: 70 MMHG | HEIGHT: 67 IN | SYSTOLIC BLOOD PRESSURE: 120 MMHG | RESPIRATION RATE: 16 BRPM | BODY MASS INDEX: 25.45 KG/M2 | OXYGEN SATURATION: 96 %

## 2024-07-23 DIAGNOSIS — S63.92XD HAND SPRAIN, LEFT, SUBSEQUENT ENCOUNTER: ICD-10-CM

## 2024-07-23 DIAGNOSIS — S06.5XAA SUBDURAL HEMATOMA (HCC): ICD-10-CM

## 2024-07-23 DIAGNOSIS — R41.0 CONFUSION: Primary | ICD-10-CM

## 2024-07-23 DIAGNOSIS — E05.00 GRAVES DISEASE: ICD-10-CM

## 2024-07-23 PROCEDURE — 1036F TOBACCO NON-USER: CPT | Performed by: NURSE PRACTITIONER

## 2024-07-23 PROCEDURE — G8427 DOCREV CUR MEDS BY ELIG CLIN: HCPCS | Performed by: NURSE PRACTITIONER

## 2024-07-23 PROCEDURE — 1123F ACP DISCUSS/DSCN MKR DOCD: CPT | Performed by: NURSE PRACTITIONER

## 2024-07-23 PROCEDURE — 36415 COLL VENOUS BLD VENIPUNCTURE: CPT | Performed by: NURSE PRACTITIONER

## 2024-07-23 PROCEDURE — 99214 OFFICE O/P EST MOD 30 MIN: CPT | Performed by: NURSE PRACTITIONER

## 2024-07-23 PROCEDURE — G8419 CALC BMI OUT NRM PARAM NOF/U: HCPCS | Performed by: NURSE PRACTITIONER

## 2024-07-23 RX ORDER — TRAZODONE HYDROCHLORIDE 50 MG/1
25 TABLET ORAL NIGHTLY
COMMUNITY

## 2024-07-23 RX ORDER — AMLODIPINE BESYLATE 5 MG/1
5 TABLET ORAL DAILY
COMMUNITY

## 2024-07-23 RX ORDER — OMEPRAZOLE 40 MG/1
40 CAPSULE, DELAYED RELEASE ORAL DAILY
COMMUNITY

## 2024-07-23 NOTE — PROGRESS NOTES
Chief Complaint   Patient presents with    Follow-Up from Hospital       ASSESSMENT AND PLAN:       1. Confusion    - MA COLLECTION VENOUS BLOOD VENIPUNCTURE  - CBC with Auto Differential; Future  - Comprehensive Metabolic Panel; Future  - Lipid Panel; Future  - Hemoglobin A1C; Future  - AMB POC URINALYSIS DIP STICK AUTO W/O MICRO  - Culture, Urine; Future    2. Graves disease    - TSH; Future  - T4, Free; Future    3. Subdural hematoma (HCC)    4. Hand sprain, left, subsequent encounter    - diclofenac (VOLTAREN) 50 MG EC tablet; Take 1 tablet by mouth in the morning and at bedtime for 5 days  Dispense: 10 tablet; Refill: 0     Significent decline in cognition since I last saw him in January. Chart review reveals memory changes noted during hospital visit in May. His wife provides the history today. He does not know who I am, where he is, or what is going on. Check labs as above. Rx low dose Voltaren for 5 days for his L hand pain and swelling. It is improving. Continue with elevation and icing. A longer conversation needs to be had about in home care and his safety. Hx multiple falls. Lives alone with wife. Forty minute follow up scheduled with me for next week to investigate further.     Patient aware of plan of care and verbalized understanding. Questions answered. RTC 1 week, or sooner if needed.    HPI:    Mr.Lentz Arce is a 89 y.o. male in today for ER follow up.    Hx Graves disease, back on methimazole 5 mg. TSH suppressed October 2023, medication increased to 10 mg Monday and Friday, 5 mg all other days. Next endo visit is in September 2024.    HTN: On metoprolol.    HLD: Compliant with atorvastatin therapy.     New issues: Hospitalized back in May for a fall with subdural hematoma and SAH. Still needs repeat CT scan. There was some concern about dementia during his hospital stay. He is in though today after being seen in the ER on 7/20. He fell and injured his L hand.  He has had multiple falls. He is a

## 2024-07-24 ENCOUNTER — TELEPHONE (OUTPATIENT)
Age: 89
End: 2024-07-24

## 2024-07-24 LAB
ALBUMIN SERPL-MCNC: 3 G/DL (ref 3.5–5)
ALBUMIN/GLOB SERPL: 0.9 (ref 1.1–2.2)
ALP SERPL-CCNC: 106 U/L (ref 45–117)
ALT SERPL-CCNC: 20 U/L (ref 12–78)
ANION GAP SERPL CALC-SCNC: 10 MMOL/L (ref 5–15)
AST SERPL-CCNC: 16 U/L (ref 15–37)
BASOPHILS # BLD: 0 K/UL (ref 0–0.1)
BASOPHILS NFR BLD: 1 % (ref 0–1)
BILIRUB SERPL-MCNC: 0.6 MG/DL (ref 0.2–1)
BUN SERPL-MCNC: 13 MG/DL (ref 6–20)
BUN/CREAT SERPL: 11 (ref 12–20)
CALCIUM SERPL-MCNC: 9.1 MG/DL (ref 8.5–10.1)
CHLORIDE SERPL-SCNC: 103 MMOL/L (ref 97–108)
CHOLEST SERPL-MCNC: 180 MG/DL
CO2 SERPL-SCNC: 25 MMOL/L (ref 21–32)
CREAT SERPL-MCNC: 1.15 MG/DL (ref 0.7–1.3)
DIFFERENTIAL METHOD BLD: ABNORMAL
EOSINOPHIL # BLD: 0 K/UL (ref 0–0.4)
EOSINOPHIL NFR BLD: 0 % (ref 0–7)
ERYTHROCYTE [DISTWIDTH] IN BLOOD BY AUTOMATED COUNT: 14.1 % (ref 11.5–14.5)
EST. AVERAGE GLUCOSE BLD GHB EST-MCNC: 108 MG/DL
GLOBULIN SER CALC-MCNC: 3.3 G/DL (ref 2–4)
GLUCOSE SERPL-MCNC: 101 MG/DL (ref 65–100)
HBA1C MFR BLD: 5.4 % (ref 4–5.6)
HCT VFR BLD AUTO: 35.6 % (ref 36.6–50.3)
HDLC SERPL-MCNC: 49 MG/DL
HDLC SERPL: 3.7 (ref 0–5)
HGB BLD-MCNC: 11.6 G/DL (ref 12.1–17)
IMM GRANULOCYTES # BLD AUTO: 0 K/UL (ref 0–0.04)
IMM GRANULOCYTES NFR BLD AUTO: 0 % (ref 0–0.5)
LDLC SERPL CALC-MCNC: 111 MG/DL (ref 0–100)
LYMPHOCYTES # BLD: 1 K/UL (ref 0.8–3.5)
LYMPHOCYTES NFR BLD: 11 % (ref 12–49)
MCH RBC QN AUTO: 30.9 PG (ref 26–34)
MCHC RBC AUTO-ENTMCNC: 32.6 G/DL (ref 30–36.5)
MCV RBC AUTO: 94.9 FL (ref 80–99)
MONOCYTES # BLD: 0.7 K/UL (ref 0–1)
MONOCYTES NFR BLD: 8 % (ref 5–13)
NEUTS SEG # BLD: 6.7 K/UL (ref 1.8–8)
NEUTS SEG NFR BLD: 80 % (ref 32–75)
NRBC # BLD: 0 K/UL (ref 0–0.01)
NRBC BLD-RTO: 0 PER 100 WBC
PLATELET # BLD AUTO: 305 K/UL (ref 150–400)
PMV BLD AUTO: 9.8 FL (ref 8.9–12.9)
POTASSIUM SERPL-SCNC: 4.3 MMOL/L (ref 3.5–5.1)
PROT SERPL-MCNC: 6.3 G/DL (ref 6.4–8.2)
RBC # BLD AUTO: 3.75 M/UL (ref 4.1–5.7)
SODIUM SERPL-SCNC: 138 MMOL/L (ref 136–145)
T4 FREE SERPL-MCNC: 0.9 NG/DL (ref 0.8–1.5)
TRIGL SERPL-MCNC: 100 MG/DL
TSH SERPL DL<=0.05 MIU/L-ACNC: 1.14 UIU/ML (ref 0.36–3.74)
VLDLC SERPL CALC-MCNC: 20 MG/DL
WBC # BLD AUTO: 8.4 K/UL (ref 4.1–11.1)

## 2024-07-24 NOTE — TELEPHONE ENCOUNTER
[facility] requests call back. Regarding urine culture not received.

## 2024-07-26 ENCOUNTER — TELEPHONE (OUTPATIENT)
Age: 89
End: 2024-07-26

## 2024-07-26 NOTE — TELEPHONE ENCOUNTER
Aletha wanted to let Laly know Neftali's blood pressure was 91/62 this morning. She wanted to know if they need to do anything with adjusting his medications. Please advise.

## 2024-07-26 NOTE — TELEPHONE ENCOUNTER
S/w Aletha he is sleepy and kind of out of it  . He has  PT tomorrow and they will report his BP reading to them and let us lnow if needed

## 2024-07-26 NOTE — TELEPHONE ENCOUNTER
Stop amlodipine. Please ask Aletha how things are at home. I saw Neftali for the first time in 6 months earlier this week and he was very weak, confused. His wife also seemed confused. Who is helping take care of them? He will be back early next week to follow up but I value her opinion.

## 2024-08-26 ENCOUNTER — OFFICE VISIT (OUTPATIENT)
Age: 89
End: 2024-08-26
Payer: MEDICARE

## 2024-08-26 VITALS
HEIGHT: 67 IN | HEART RATE: 76 BPM | OXYGEN SATURATION: 99 % | RESPIRATION RATE: 16 BRPM | WEIGHT: 153.6 LBS | DIASTOLIC BLOOD PRESSURE: 72 MMHG | BODY MASS INDEX: 24.11 KG/M2 | SYSTOLIC BLOOD PRESSURE: 120 MMHG | TEMPERATURE: 98 F

## 2024-08-26 DIAGNOSIS — H61.23 BILATERAL IMPACTED CERUMEN: ICD-10-CM

## 2024-08-26 DIAGNOSIS — S06.5XAA SUBDURAL HEMATOMA (HCC): Primary | ICD-10-CM

## 2024-08-26 DIAGNOSIS — F03.B0 MODERATE DEMENTIA, UNSPECIFIED DEMENTIA TYPE, UNSPECIFIED WHETHER BEHAVIORAL, PSYCHOTIC, OR MOOD DISTURBANCE OR ANXIETY (HCC): ICD-10-CM

## 2024-08-26 PROCEDURE — G8420 CALC BMI NORM PARAMETERS: HCPCS | Performed by: NURSE PRACTITIONER

## 2024-08-26 PROCEDURE — G8427 DOCREV CUR MEDS BY ELIG CLIN: HCPCS | Performed by: NURSE PRACTITIONER

## 2024-08-26 PROCEDURE — 36415 COLL VENOUS BLD VENIPUNCTURE: CPT | Performed by: NURSE PRACTITIONER

## 2024-08-26 PROCEDURE — 69209 REMOVE IMPACTED EAR WAX UNI: CPT | Performed by: NURSE PRACTITIONER

## 2024-08-26 PROCEDURE — 1123F ACP DISCUSS/DSCN MKR DOCD: CPT | Performed by: NURSE PRACTITIONER

## 2024-08-26 PROCEDURE — 99214 OFFICE O/P EST MOD 30 MIN: CPT | Performed by: NURSE PRACTITIONER

## 2024-08-26 PROCEDURE — 1036F TOBACCO NON-USER: CPT | Performed by: NURSE PRACTITIONER

## 2024-08-26 ASSESSMENT — PATIENT HEALTH QUESTIONNAIRE - PHQ9
2. FEELING DOWN, DEPRESSED OR HOPELESS: NOT AT ALL
SUM OF ALL RESPONSES TO PHQ QUESTIONS 1-9: 0
SUM OF ALL RESPONSES TO PHQ QUESTIONS 1-9: 0
SUM OF ALL RESPONSES TO PHQ9 QUESTIONS 1 & 2: 0
1. LITTLE INTEREST OR PLEASURE IN DOING THINGS: NOT AT ALL
SUM OF ALL RESPONSES TO PHQ QUESTIONS 1-9: 0
SUM OF ALL RESPONSES TO PHQ QUESTIONS 1-9: 0

## 2024-08-26 NOTE — PROGRESS NOTES
\"Have you been to the ER, urgent care clinic since your last visit?  Hospitalized since your last visit?\"    NO    “Have you seen or consulted any other health care providers outside of [facility] since your last visit?”    NO            Click Here for Release of Records Request   coccyx

## 2024-08-27 LAB
ALBUMIN SERPL-MCNC: 3.3 G/DL (ref 3.5–5)
ALBUMIN/GLOB SERPL: 1 (ref 1.1–2.2)
ALP SERPL-CCNC: 82 U/L (ref 45–117)
ALT SERPL-CCNC: 12 U/L (ref 12–78)
ANION GAP SERPL CALC-SCNC: 4 MMOL/L (ref 5–15)
AST SERPL-CCNC: 14 U/L (ref 15–37)
BASOPHILS # BLD: 0 K/UL (ref 0–0.1)
BASOPHILS NFR BLD: 1 % (ref 0–1)
BILIRUB SERPL-MCNC: 0.4 MG/DL (ref 0.2–1)
BUN SERPL-MCNC: 16 MG/DL (ref 6–20)
BUN/CREAT SERPL: 14 (ref 12–20)
CALCIUM SERPL-MCNC: 9.2 MG/DL (ref 8.5–10.1)
CHLORIDE SERPL-SCNC: 108 MMOL/L (ref 97–108)
CO2 SERPL-SCNC: 28 MMOL/L (ref 21–32)
CREAT SERPL-MCNC: 1.16 MG/DL (ref 0.7–1.3)
DIFFERENTIAL METHOD BLD: ABNORMAL
EOSINOPHIL # BLD: 0.1 K/UL (ref 0–0.4)
EOSINOPHIL NFR BLD: 2 % (ref 0–7)
ERYTHROCYTE [DISTWIDTH] IN BLOOD BY AUTOMATED COUNT: 15.4 % (ref 11.5–14.5)
GLOBULIN SER CALC-MCNC: 3.2 G/DL (ref 2–4)
GLUCOSE SERPL-MCNC: 147 MG/DL (ref 65–100)
HCT VFR BLD AUTO: 37.7 % (ref 36.6–50.3)
HGB BLD-MCNC: 11.6 G/DL (ref 12.1–17)
IMM GRANULOCYTES # BLD AUTO: 0 K/UL (ref 0–0.04)
IMM GRANULOCYTES NFR BLD AUTO: 0 % (ref 0–0.5)
LYMPHOCYTES # BLD: 0.8 K/UL (ref 0.8–3.5)
LYMPHOCYTES NFR BLD: 24 % (ref 12–49)
MCH RBC QN AUTO: 31.5 PG (ref 26–34)
MCHC RBC AUTO-ENTMCNC: 30.8 G/DL (ref 30–36.5)
MCV RBC AUTO: 102.4 FL (ref 80–99)
MONOCYTES # BLD: 0.4 K/UL (ref 0–1)
MONOCYTES NFR BLD: 12 % (ref 5–13)
NEUTS SEG # BLD: 2.2 K/UL (ref 1.8–8)
NEUTS SEG NFR BLD: 61 % (ref 32–75)
NRBC # BLD: 0 K/UL (ref 0–0.01)
NRBC BLD-RTO: 0 PER 100 WBC
PLATELET # BLD AUTO: 228 K/UL (ref 150–400)
PMV BLD AUTO: 10 FL (ref 8.9–12.9)
POTASSIUM SERPL-SCNC: 4.2 MMOL/L (ref 3.5–5.1)
PROT SERPL-MCNC: 6.5 G/DL (ref 6.4–8.2)
RBC # BLD AUTO: 3.68 M/UL (ref 4.1–5.7)
SODIUM SERPL-SCNC: 140 MMOL/L (ref 136–145)
VIT B12 SERPL-MCNC: 330 PG/ML (ref 193–986)
WBC # BLD AUTO: 3.6 K/UL (ref 4.1–11.1)

## 2024-08-27 NOTE — PROGRESS NOTES
Chief Complaint   Patient presents with    Cerumen Impaction       ASSESSMENT AND PLAN:     1. Subdural hematoma (HCC)  Repeat CT scheduled.    2. Moderate dementia, unspecified dementia type, unspecified whether behavioral, psychotic, or mood disturbance or anxiety (HCC)    Brief MOCA in the exam room. Discussed neurology evaluation. Await CT. Consider starting Aricept.    - COLLECTION VENOUS BLOOD,VENIPUNCTURE  - CBC with Auto Differential; Future  - Comprehensive Metabolic Panel; Future  - Vitamin B12; Future  - Vitamin B12  - Comprehensive Metabolic Panel  - CBC with Auto Differential    3. Bilateral impacted cerumen    - REMOVAL IMPACTED CERUMEN IRRIGATION/LVG UNILAT      Cerumen irrigated easily, TM pearly gray. Discussed cognitive screening results. I also called his daughter. He will need outpatient PT when discharged from home health PT. I recommended increasing the frequency of [facility] [place]. She is contemplating a move in to assisted living. Her brother checks on his parents via phone 2-3 times per day.     Patient aware of plan of care and verbalized understanding. Questions answered. RTC 1 week, or sooner if needed.    HPI:    Mr.Lentz Arce is a 89 y.o. male in today for ER follow up.    Hx Graves disease, back on methimazole 5 mg. TSH suppressed October 2023, medication increased to 10 mg Monday and Friday, 5 mg all other days. Next endo visit is in September 2024.    HTN: On metoprolol.    HLD: Compliant with atorvastatin therapy.     New issues: Hospitalized back in May for a fall with subdural hematoma and SAH. Still needs repeat CT scan. There was some concern about dementia during his hospital stay. In today for cerumen removal.    No Known Allergies    Prior to Visit Medications    Medication Sig Taking? Authorizing Provider   traZODone (DESYREL) 50 MG tablet Take 0.5 tablets by mouth nightly Yes Provider, MD Ross   omeprazole (PRILOSEC) 40 MG delayed release capsule Take 1 capsule by  08/26/24 1118   BP: 120/72   Site: Right Upper Arm   Position: Sitting   Cuff Size: Medium Adult   Pulse: 76   Resp: 16   Temp: 98 °F (36.7 °C)   TempSrc: Oral   SpO2: 99%   Weight: 69.7 kg (153 lb 9.6 oz)   Height: 1.702 m (5' 7\")      Body mass index is 24.06 kg/m².     Wt Readings from Last 3 Encounters:   08/26/24 69.7 kg (153 lb 9.6 oz)   07/20/24 73.7 kg (162 lb 8 oz)   05/13/24 79.2 kg (174 lb 8 oz)       Constitutional: WDWN Male in no acute distress, disheveled  HENT:  NC/AT, TMs occluded by cerumen  EYES: EOMI, PERRL  Neck:  Supple, no JVD, mass or bruit. No thyromegaly.  Respiratory:  Respirations even and unlabored without use of accessory muscles, CTA throughout without wheezes, rales, rubs or rhonchi. Symmetrical chest expansion.  Cardiac: RRR  Musculoskeletal: moves all extremities without difficulty  Neurologic:  Even gait with cane, fine intentional tremor BUE  Skin: intact and warm to the touch, no rash   Lymphadenopathy: no cervical or supraclavicular nodes  Psych: Pleasant. Confused to place, situation. Oriented to person.     Medication Side Effects and Warnings were discussed with patient:  yes  Patient Labs were reviewed:  yes  Patient Past Records were reviewed:  yes    TYSHAWN Byrnes - NP

## 2024-08-28 ENCOUNTER — HOSPITAL ENCOUNTER (OUTPATIENT)
Facility: HOSPITAL | Age: 89
Discharge: HOME | End: 2024-08-31
Attending: NEUROLOGICAL SURGERY
Payer: MEDICARE

## 2024-08-28 DIAGNOSIS — S06.5XAA SUBDURAL HEMATOMA (HCC): ICD-10-CM

## 2024-08-28 PROCEDURE — 70450 CT HEAD/BRAIN W/O DYE: CPT

## 2024-09-04 ENCOUNTER — TELEPHONE (OUTPATIENT)
Age: 89
End: 2024-09-04

## 2024-10-21 RX ORDER — ATORVASTATIN CALCIUM 10 MG/1
10 TABLET, FILM COATED ORAL DAILY
Qty: 90 TABLET | Refills: 3 | Status: SHIPPED | OUTPATIENT
Start: 2024-10-21

## 2025-02-21 RX ORDER — METOPROLOL SUCCINATE 25 MG/1
25 TABLET, EXTENDED RELEASE ORAL DAILY
Qty: 30 TABLET | Refills: 3 | Status: SHIPPED | OUTPATIENT
Start: 2025-02-21

## 2025-02-21 NOTE — TELEPHONE ENCOUNTER
This is Neftali's Daughter Ольга. He has run out of the Metroprolol high blood pressure medicene and there aren't any refills. Can you refill that for me?

## 2025-03-10 RX ORDER — METOPROLOL SUCCINATE 25 MG/1
25 TABLET, EXTENDED RELEASE ORAL DAILY
Qty: 90 TABLET | Refills: 1 | Status: SHIPPED | OUTPATIENT
Start: 2025-03-10

## 2025-04-06 SDOH — ECONOMIC STABILITY: FOOD INSECURITY: WITHIN THE PAST 12 MONTHS, THE FOOD YOU BOUGHT JUST DIDN'T LAST AND YOU DIDN'T HAVE MONEY TO GET MORE.: NEVER TRUE

## 2025-04-06 SDOH — ECONOMIC STABILITY: FOOD INSECURITY: WITHIN THE PAST 12 MONTHS, YOU WORRIED THAT YOUR FOOD WOULD RUN OUT BEFORE YOU GOT MONEY TO BUY MORE.: NEVER TRUE

## 2025-04-06 SDOH — ECONOMIC STABILITY: INCOME INSECURITY: IN THE LAST 12 MONTHS, WAS THERE A TIME WHEN YOU WERE NOT ABLE TO PAY THE MORTGAGE OR RENT ON TIME?: NO

## 2025-04-07 ENCOUNTER — OFFICE VISIT (OUTPATIENT)
Age: 89
End: 2025-04-07
Payer: MEDICARE

## 2025-04-07 VITALS
OXYGEN SATURATION: 95 % | BODY MASS INDEX: 24.55 KG/M2 | HEART RATE: 82 BPM | SYSTOLIC BLOOD PRESSURE: 138 MMHG | RESPIRATION RATE: 16 BRPM | HEIGHT: 67 IN | WEIGHT: 156.4 LBS | TEMPERATURE: 98.2 F | DIASTOLIC BLOOD PRESSURE: 72 MMHG

## 2025-04-07 DIAGNOSIS — H61.23 BILATERAL IMPACTED CERUMEN: ICD-10-CM

## 2025-04-07 DIAGNOSIS — R73.01 IFG (IMPAIRED FASTING GLUCOSE): ICD-10-CM

## 2025-04-07 DIAGNOSIS — E78.2 MIXED HYPERLIPIDEMIA: ICD-10-CM

## 2025-04-07 DIAGNOSIS — S06.5XAA SUBDURAL HEMATOMA (HCC): ICD-10-CM

## 2025-04-07 DIAGNOSIS — E05.00 GRAVES DISEASE: Primary | ICD-10-CM

## 2025-04-07 DIAGNOSIS — N18.30 STAGE 3 CHRONIC KIDNEY DISEASE, UNSPECIFIED WHETHER STAGE 3A OR 3B CKD (HCC): ICD-10-CM

## 2025-04-07 DIAGNOSIS — F03.B0 MODERATE DEMENTIA, UNSPECIFIED DEMENTIA TYPE, UNSPECIFIED WHETHER BEHAVIORAL, PSYCHOTIC, OR MOOD DISTURBANCE OR ANXIETY (HCC): ICD-10-CM

## 2025-04-07 PROCEDURE — G8420 CALC BMI NORM PARAMETERS: HCPCS | Performed by: NURSE PRACTITIONER

## 2025-04-07 PROCEDURE — 1125F AMNT PAIN NOTED PAIN PRSNT: CPT | Performed by: NURSE PRACTITIONER

## 2025-04-07 PROCEDURE — 36415 COLL VENOUS BLD VENIPUNCTURE: CPT | Performed by: NURSE PRACTITIONER

## 2025-04-07 PROCEDURE — 1123F ACP DISCUSS/DSCN MKR DOCD: CPT | Performed by: NURSE PRACTITIONER

## 2025-04-07 PROCEDURE — 1036F TOBACCO NON-USER: CPT | Performed by: NURSE PRACTITIONER

## 2025-04-07 PROCEDURE — 69209 REMOVE IMPACTED EAR WAX UNI: CPT | Performed by: NURSE PRACTITIONER

## 2025-04-07 PROCEDURE — 1159F MED LIST DOCD IN RCRD: CPT | Performed by: NURSE PRACTITIONER

## 2025-04-07 PROCEDURE — G8427 DOCREV CUR MEDS BY ELIG CLIN: HCPCS | Performed by: NURSE PRACTITIONER

## 2025-04-07 PROCEDURE — 1160F RVW MEDS BY RX/DR IN RCRD: CPT | Performed by: NURSE PRACTITIONER

## 2025-04-07 PROCEDURE — 99214 OFFICE O/P EST MOD 30 MIN: CPT | Performed by: NURSE PRACTITIONER

## 2025-04-07 RX ORDER — METHIMAZOLE 5 MG/1
TABLET ORAL
Qty: 90 TABLET | Refills: 3 | Status: SHIPPED
Start: 2025-04-07

## 2025-04-07 ASSESSMENT — PATIENT HEALTH QUESTIONNAIRE - PHQ9
SUM OF ALL RESPONSES TO PHQ QUESTIONS 1-9: 0
1. LITTLE INTEREST OR PLEASURE IN DOING THINGS: NOT AT ALL
SUM OF ALL RESPONSES TO PHQ QUESTIONS 1-9: 0
2. FEELING DOWN, DEPRESSED OR HOPELESS: NOT AT ALL

## 2025-04-07 NOTE — PROGRESS NOTES
\"Have you been to the ER, urgent care clinic since your last visit?  Hospitalized since your last visit?\"    NO    “Have you seen or consulted any other health care providers outside our system since your last visit?”    NO           
review of systems was negative except for that written in the HPI.    Patient is not experiencing chest pain radiating to the jaw and/or down the arms.    Physical Examination:    Vitals:    04/07/25 1102   BP: 138/72   BP Site: Right Upper Arm   Patient Position: Sitting   BP Cuff Size: Medium Adult   Pulse: 82   Resp: 16   Temp: 98.2 °F (36.8 °C)   TempSrc: Oral   SpO2: 95%   Weight: 70.9 kg (156 lb 6.4 oz)   Height: 1.702 m (5' 7\")        Body mass index is 24.5 kg/m².     Wt Readings from Last 3 Encounters:   04/07/25 70.9 kg (156 lb 6.4 oz)   08/26/24 69.7 kg (153 lb 9.6 oz)   07/20/24 73.7 kg (162 lb 8 oz)       Constitutional: WDWN Male in no acute distress  HENT:  NC/AT, TMs occluded by cerumen  EYES: EOMI, PERRL  Neck:  Supple, no JVD, mass or bruit. No thyromegaly.  Respiratory:  Respirations even and unlabored without use of accessory muscles, CTA throughout without wheezes, rales, rubs or rhonchi. Symmetrical chest expansion.  Cardiac: RRR  Musculoskeletal: moves all extremities without difficulty  Neurologic:  Even gait with cane, fine intentional tremor BUE  Skin: intact and warm to the touch, no rash   Lymphadenopathy: no cervical or supraclavicular nodes  Psych: Pleasant. Confused to place, situation. Oriented to person.     Medication Side Effects and Warnings were discussed with patient:  yes  Patient Labs were reviewed:  yes  Patient Past Records were reviewed:  yes    TYSHAWN Byrnes NP

## 2025-04-08 LAB
ALBUMIN SERPL-MCNC: 3.9 G/DL (ref 3.5–5)
ALBUMIN/GLOB SERPL: 1.3 (ref 1.1–2.2)
ALP SERPL-CCNC: 88 U/L (ref 45–117)
ALT SERPL-CCNC: 22 U/L (ref 12–78)
ANION GAP SERPL CALC-SCNC: 8 MMOL/L (ref 2–12)
AST SERPL-CCNC: 32 U/L (ref 15–37)
BASOPHILS # BLD: 0.03 K/UL (ref 0–0.1)
BASOPHILS NFR BLD: 0.9 % (ref 0–1)
BILIRUB SERPL-MCNC: 0.8 MG/DL (ref 0.2–1)
BUN SERPL-MCNC: 32 MG/DL (ref 6–20)
BUN/CREAT SERPL: 21 (ref 12–20)
CALCIUM SERPL-MCNC: 9.3 MG/DL (ref 8.5–10.1)
CHLORIDE SERPL-SCNC: 105 MMOL/L (ref 97–108)
CHOLEST SERPL-MCNC: 184 MG/DL
CO2 SERPL-SCNC: 25 MMOL/L (ref 21–32)
CREAT SERPL-MCNC: 1.5 MG/DL (ref 0.7–1.3)
DIFFERENTIAL METHOD BLD: ABNORMAL
EOSINOPHIL # BLD: 0.11 K/UL (ref 0–0.4)
EOSINOPHIL NFR BLD: 3.2 % (ref 0–7)
ERYTHROCYTE [DISTWIDTH] IN BLOOD BY AUTOMATED COUNT: 13.2 % (ref 11.5–14.5)
EST. AVERAGE GLUCOSE BLD GHB EST-MCNC: 105 MG/DL
FOLATE SERPL-MCNC: 71.9 NG/ML (ref 5–21)
GLOBULIN SER CALC-MCNC: 3.1 G/DL (ref 2–4)
GLUCOSE SERPL-MCNC: 98 MG/DL (ref 65–100)
HBA1C MFR BLD: 5.3 % (ref 4–5.6)
HCT VFR BLD AUTO: 40.9 % (ref 36.6–50.3)
HDLC SERPL-MCNC: 62 MG/DL
HDLC SERPL: 3 (ref 0–5)
HGB BLD-MCNC: 13.3 G/DL (ref 12.1–17)
IMM GRANULOCYTES # BLD AUTO: 0.01 K/UL (ref 0–0.04)
IMM GRANULOCYTES NFR BLD AUTO: 0.3 % (ref 0–0.5)
LDLC SERPL CALC-MCNC: 110 MG/DL (ref 0–100)
LYMPHOCYTES # BLD: 0.93 K/UL (ref 0.8–3.5)
LYMPHOCYTES NFR BLD: 27 % (ref 12–49)
MCH RBC QN AUTO: 31.7 PG (ref 26–34)
MCHC RBC AUTO-ENTMCNC: 32.5 G/DL (ref 30–36.5)
MCV RBC AUTO: 97.4 FL (ref 80–99)
MONOCYTES # BLD: 0.34 K/UL (ref 0–1)
MONOCYTES NFR BLD: 9.9 % (ref 5–13)
NEUTS SEG # BLD: 2.03 K/UL (ref 1.8–8)
NEUTS SEG NFR BLD: 58.7 % (ref 32–75)
NRBC # BLD: 0 K/UL (ref 0–0.01)
NRBC BLD-RTO: 0 PER 100 WBC
PLATELET # BLD AUTO: 216 K/UL (ref 150–400)
PMV BLD AUTO: 10.3 FL (ref 8.9–12.9)
POTASSIUM SERPL-SCNC: 4.3 MMOL/L (ref 3.5–5.1)
PROT SERPL-MCNC: 7 G/DL (ref 6.4–8.2)
RBC # BLD AUTO: 4.2 M/UL (ref 4.1–5.7)
SODIUM SERPL-SCNC: 138 MMOL/L (ref 136–145)
T4 FREE SERPL-MCNC: 1.1 NG/DL (ref 0.8–1.5)
TRIGL SERPL-MCNC: 60 MG/DL
TSH SERPL DL<=0.05 MIU/L-ACNC: 0.36 UIU/ML (ref 0.36–3.74)
VIT B12 SERPL-MCNC: 860 PG/ML (ref 193–986)
VLDLC SERPL CALC-MCNC: 12 MG/DL
WBC # BLD AUTO: 3.5 K/UL (ref 4.1–11.1)

## 2025-04-09 ENCOUNTER — RESULTS FOLLOW-UP (OUTPATIENT)
Age: 89
End: 2025-04-09

## 2025-04-20 ENCOUNTER — HOSPITAL ENCOUNTER (EMERGENCY)
Facility: HOSPITAL | Age: 89
Discharge: HOME | End: 2025-04-20
Attending: EMERGENCY MEDICINE
Payer: MEDICARE

## 2025-04-20 ENCOUNTER — APPOINTMENT (OUTPATIENT)
Facility: HOSPITAL | Age: 89
End: 2025-04-20
Payer: MEDICARE

## 2025-04-20 VITALS
BODY MASS INDEX: 26 KG/M2 | HEART RATE: 73 BPM | SYSTOLIC BLOOD PRESSURE: 185 MMHG | OXYGEN SATURATION: 97 % | DIASTOLIC BLOOD PRESSURE: 87 MMHG | RESPIRATION RATE: 18 BRPM | WEIGHT: 166 LBS | TEMPERATURE: 98.7 F

## 2025-04-20 DIAGNOSIS — K59.00 CONSTIPATION, UNSPECIFIED CONSTIPATION TYPE: Primary | ICD-10-CM

## 2025-04-20 LAB
ALBUMIN SERPL-MCNC: 3.4 G/DL (ref 3.5–5)
ALBUMIN/GLOB SERPL: 1 (ref 1.1–2.2)
ALP SERPL-CCNC: 78 U/L (ref 45–117)
ALT SERPL-CCNC: 18 U/L (ref 12–78)
ANION GAP SERPL CALC-SCNC: 9 MMOL/L (ref 2–12)
AST SERPL-CCNC: 27 U/L (ref 15–37)
BASOPHILS # BLD: 0.01 K/UL (ref 0–0.1)
BASOPHILS NFR BLD: 0.3 % (ref 0–1)
BILIRUB SERPL-MCNC: 0.8 MG/DL (ref 0.2–1)
BUN SERPL-MCNC: 37 MG/DL (ref 6–20)
BUN/CREAT SERPL: 26 (ref 12–20)
CALCIUM SERPL-MCNC: 8.8 MG/DL (ref 8.5–10.1)
CHLORIDE SERPL-SCNC: 105 MMOL/L (ref 97–108)
CO2 SERPL-SCNC: 28 MMOL/L (ref 21–32)
CREAT SERPL-MCNC: 1.44 MG/DL (ref 0.7–1.3)
DIFFERENTIAL METHOD BLD: ABNORMAL
EOSINOPHIL # BLD: 0.03 K/UL (ref 0–0.4)
EOSINOPHIL NFR BLD: 0.8 % (ref 0–7)
ERYTHROCYTE [DISTWIDTH] IN BLOOD BY AUTOMATED COUNT: 12.9 % (ref 11.5–14.5)
GLOBULIN SER CALC-MCNC: 3.4 G/DL (ref 2–4)
GLUCOSE SERPL-MCNC: 111 MG/DL (ref 65–100)
HCT VFR BLD AUTO: 35.8 % (ref 36.6–50.3)
HGB BLD-MCNC: 12.2 G/DL (ref 12.1–17)
IMM GRANULOCYTES # BLD AUTO: 0.01 K/UL (ref 0–0.04)
IMM GRANULOCYTES NFR BLD AUTO: 0.3 % (ref 0–0.5)
LACTATE SERPL-SCNC: 0.9 MMOL/L (ref 0.4–2)
LIPASE SERPL-CCNC: 52 U/L (ref 13–75)
LYMPHOCYTES # BLD: 0.51 K/UL (ref 0.8–3.5)
LYMPHOCYTES NFR BLD: 13.5 % (ref 12–49)
MCH RBC QN AUTO: 32 PG (ref 26–34)
MCHC RBC AUTO-ENTMCNC: 34.1 G/DL (ref 30–36.5)
MCV RBC AUTO: 94 FL (ref 80–99)
MONOCYTES # BLD: 0.43 K/UL (ref 0–1)
MONOCYTES NFR BLD: 11.3 % (ref 5–13)
NEUTS SEG # BLD: 2.8 K/UL (ref 1.8–8)
NEUTS SEG NFR BLD: 73.8 % (ref 32–75)
NRBC # BLD: 0 K/UL (ref 0–0.01)
NRBC BLD-RTO: 0 PER 100 WBC
PLATELET # BLD AUTO: 178 K/UL (ref 150–400)
PLATELET COMMENT: ABNORMAL
PMV BLD AUTO: 8.9 FL (ref 8.9–12.9)
POTASSIUM SERPL-SCNC: 4.1 MMOL/L (ref 3.5–5.1)
PROT SERPL-MCNC: 6.8 G/DL (ref 6.4–8.2)
RBC # BLD AUTO: 3.81 M/UL (ref 4.1–5.7)
RBC MORPH BLD: ABNORMAL
SODIUM SERPL-SCNC: 142 MMOL/L (ref 136–145)
WBC # BLD AUTO: 3.8 K/UL (ref 4.1–11.1)

## 2025-04-20 PROCEDURE — 36415 COLL VENOUS BLD VENIPUNCTURE: CPT

## 2025-04-20 PROCEDURE — 74177 CT ABD & PELVIS W/CONTRAST: CPT

## 2025-04-20 PROCEDURE — 83605 ASSAY OF LACTIC ACID: CPT

## 2025-04-20 PROCEDURE — 99285 EMERGENCY DEPT VISIT HI MDM: CPT

## 2025-04-20 PROCEDURE — 93005 ELECTROCARDIOGRAM TRACING: CPT | Performed by: EMERGENCY MEDICINE

## 2025-04-20 PROCEDURE — 85025 COMPLETE CBC W/AUTO DIFF WBC: CPT

## 2025-04-20 PROCEDURE — 6360000004 HC RX CONTRAST MEDICATION: Performed by: EMERGENCY MEDICINE

## 2025-04-20 PROCEDURE — 83690 ASSAY OF LIPASE: CPT

## 2025-04-20 PROCEDURE — 80053 COMPREHEN METABOLIC PANEL: CPT

## 2025-04-20 RX ORDER — IOPAMIDOL 755 MG/ML
100 INJECTION, SOLUTION INTRAVASCULAR
Status: COMPLETED | OUTPATIENT
Start: 2025-04-20 | End: 2025-04-20

## 2025-04-20 RX ADMIN — IOPAMIDOL 100 ML: 755 INJECTION, SOLUTION INTRAVENOUS at 15:29

## 2025-04-20 ASSESSMENT — PAIN - FUNCTIONAL ASSESSMENT: PAIN_FUNCTIONAL_ASSESSMENT: 0-10

## 2025-04-20 ASSESSMENT — PAIN DESCRIPTION - FREQUENCY: FREQUENCY: INTERMITTENT

## 2025-04-20 ASSESSMENT — PAIN DESCRIPTION - ORIENTATION: ORIENTATION: MID;LOWER

## 2025-04-20 ASSESSMENT — PAIN DESCRIPTION - DESCRIPTORS: DESCRIPTORS: DISCOMFORT;SHARP

## 2025-04-20 ASSESSMENT — PAIN DESCRIPTION - LOCATION: LOCATION: ABDOMEN;PELVIS

## 2025-04-20 ASSESSMENT — LIFESTYLE VARIABLES
HOW MANY STANDARD DRINKS CONTAINING ALCOHOL DO YOU HAVE ON A TYPICAL DAY: PATIENT DOES NOT DRINK
HOW OFTEN DO YOU HAVE A DRINK CONTAINING ALCOHOL: NEVER

## 2025-04-20 ASSESSMENT — PAIN DESCRIPTION - PAIN TYPE: TYPE: ACUTE PAIN

## 2025-04-20 ASSESSMENT — PAIN SCALES - GENERAL: PAINLEVEL_OUTOF10: 8

## 2025-04-20 NOTE — ED TRIAGE NOTES
Pt sent by wife from home by EMS for constipation. Per EMS pt wife did not know his birthday or what medicine she gave him. Unaware of last BM. 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 Reports 8/10 pelvic pain nondescript, says he tried to have a BM but can't remember if he did. Pt unaware of any medical history, thinks he has an appendix and a gallbladder but isn't sure. Vitals WNL, in no apparent distress.

## 2025-04-20 NOTE — ED PROVIDER NOTES
CURRENT MEDICATIONS      Previous Medications    ATORVASTATIN (LIPITOR) 10 MG TABLET    TAKE 1 TABLET BY MOUTH EVERY DAY    CYANOCOBALAMIN ([employer] VITAMIN B12) 1000 MCG TABLET    Take 1 tablet by mouth daily    METHIMAZOLE (TAPAZOLE) 5 MG TABLET    1 tab PO QD    METOPROLOL SUCCINATE (TOPROL XL) 25 MG EXTENDED RELEASE TABLET    TAKE 1 TABLET BY MOUTH EVERY DAY       SCREENINGS               No data recorded            PHYSICAL EXAM      ED Triage Vitals [04/20/25 1348]   Encounter Vitals Group      BP (!) 159/93      Systolic BP Percentile       Diastolic BP Percentile       Pulse 74      Respirations 16      Temp 98.7 °F (37.1 °C)      Temp Source Oral      SpO2 96 %      Weight - Scale 75.3 kg (166 lb)      Height       Head Circumference       Peak Flow       Pain Score       Pain Loc       Pain Education       Exclude from Growth Chart         Physical Exam  Constitutional:       Appearance: He is well-developed.   Cardiovascular:      Rate and Rhythm: Normal rate and regular rhythm.   Pulmonary:      Effort: Pulmonary effort is normal.      Breath sounds: Normal breath sounds.   Abdominal:      Palpations: Abdomen is soft.      Tenderness: There is no abdominal tenderness.   Genitourinary:     Comments: Hard stool in rectal vault.  Exam performed with nurse Taisha present.  Skin:     General: Skin is warm.   Neurological:      Mental Status: He is alert and oriented to person, place, and time.          DIAGNOSTIC RESULTS   LABS:     Recent Results (from the past 24 hours)   EKG 12 Lead    Collection Time: 04/20/25  2:13 PM   Result Value Ref Range    Ventricular Rate 73 BPM    Atrial Rate 73 BPM    P-R Interval 152 ms    QRS Duration 84 ms    Q-T Interval 398 ms    QTc Calculation (Bazett) 438 ms    P Axis 41 degrees    R Axis -15 degrees    T Axis 20 degrees    Diagnosis       Normal sinus rhythm  Moderate voltage criteria for LVH, may be normal variant  Borderline ECG  When compared with ECG of 08-MAY-2024

## 2025-04-20 NOTE — ED NOTES
This RN and ED Provider at bedside 1541 - 1601 for disimpaction and mineral oil enema. Multiple medium sized hard stool pellets removed. Pt reports relief, now attempting to urinate.

## 2025-04-20 NOTE — ED NOTES
Pt waiting on ride from caregiver, PCT at bedside as pt has been wandering halls asking where his ride is after being encouraged to stay in room.

## 2025-04-20 NOTE — DISCHARGE INSTRUCTIONS
Mr. Henderson had blood work and a CT scan of his abdomen today.  His CT scan showed constipation and a 1.5cm RIGHT RENAL lesion.  Please talk to his primary care doctor about follow-up for this lesion (dedicated renal CT or MRI recommended).    Please try to drink plenty of water and eat foods high in fiber.

## 2025-04-20 NOTE — ED NOTES
Pt wife who was en route per EMS when they left with pt has still not arrived to the ED. Pt transported to CT scan with [employer] by Stretcher at this time.

## 2025-04-21 LAB
EKG ATRIAL RATE: 73 BPM
EKG DIAGNOSIS: NORMAL
EKG P AXIS: 41 DEGREES
EKG P-R INTERVAL: 152 MS
EKG Q-T INTERVAL: 398 MS
EKG QRS DURATION: 84 MS
EKG QTC CALCULATION (BAZETT): 438 MS
EKG R AXIS: -15 DEGREES
EKG T AXIS: 20 DEGREES
EKG VENTRICULAR RATE: 73 BPM

## (undated) DEVICE — SHEET,DRAPE,70X100,STERILE: Brand: MEDLINE

## (undated) DEVICE — PADDING CAST W6INXL4YD RAYON UNDERCAST SOF-ROL

## (undated) DEVICE — BLANKET WRM AD PREM MISTRAL-AIR

## (undated) DEVICE — TIP CLEANER: Brand: VALLEYLAB

## (undated) DEVICE — DRAPE,EXTREMITY,89X128,STERILE: Brand: MEDLINE

## (undated) DEVICE — PACK,SET UP,NO DRAPES: Brand: MEDLINE

## (undated) DEVICE — 1.25MM NON-THREADED GUIDE WIRE 150MM

## (undated) DEVICE — 3M™ STERI-DRAPE™ U-DRAPE 1015: Brand: STERI-DRAPE™

## (undated) DEVICE — SUTURE ABSORBABLE BRAIDED 2-0 CT-1 27 IN UD VICRYL J259H

## (undated) DEVICE — 3L THIN WALL CAN: Brand: CRD

## (undated) DEVICE — GUIDEWIRE ORTH L150MM DIA1.25MM S STL THRD FOR 4MM CANN SCR

## (undated) DEVICE — SKIN MARKER,REGULAR TIP WITH RULER: Brand: DEVON

## (undated) DEVICE — SOL IRR SOD CL 0.9% 1000ML BTL --

## (undated) DEVICE — SYRINGE BLB FEED IV POLE BG 60ML

## (undated) DEVICE — 2.5MM DRILL BIT/QC/GOLD/110MM

## (undated) DEVICE — STOCKINETTE,IMPERVIOUS,12X48,STERILE: Brand: MEDLINE

## (undated) DEVICE — GOWN,REINFORCED,POLY,AURORA,LARGE,STRL: Brand: MEDLINE

## (undated) DEVICE — BANDAGE COMPR W6INXL5YD NONSTERILE TAN BRTH SELF ADH WRP W/

## (undated) DEVICE — C-ARM: Brand: UNBRANDED

## (undated) DEVICE — DRAPE SHT 3 QTR PROXIMA 53X77 --

## (undated) DEVICE — BIT DRL L110MM DIA3.5MM QUIK CPL W/O STP REUSE

## (undated) DEVICE — APPLICATOR SCRB 26ML TEAL STRL -- CHLORAPREP 26ML

## (undated) DEVICE — BANDAGE,ELASTIC,ESMARK,STERILE,4"X9',LF: Brand: MEDLINE

## (undated) DEVICE — Z DISCONTINUED NO SUB IDED SPLINT ORTH W5XL30IN LAYERED FBRGLS FOAM PD BRTH BK MOLD

## (undated) DEVICE — BIT DRL L160MM DIA2.7MM CANN QUIK CPL ADJ STP REUSE FOR

## (undated) DEVICE — STERILE POLYISOPRENE POWDER-FREE SURGICAL GLOVES: Brand: PROTEXIS

## (undated) DEVICE — TUBING, SUCTION, 1/4" X 10', STRAIGHT: Brand: MEDLINE

## (undated) DEVICE — SUTURE ETHLN SZ 3-0 L30IN NONABSORBABLE BLK FSL L30MM 3/8 1671H

## (undated) DEVICE — GOWN,REINFORCED,POLY,AURORA,XLARGE,STRL: Brand: MEDLINE

## (undated) DEVICE — NEEDLE HYPO 21GA L1.5IN GRN POLYPR HUB S STL THN WALL IV

## (undated) DEVICE — COVER,TABLE,44X76,STERILE: Brand: MEDLINE

## (undated) DEVICE — REM POLYHESIVE ADULT PATIENT RETURN ELECTRODE: Brand: VALLEYLAB

## (undated) DEVICE — YANKAUER,TAPERED BULBOUS TIP,W/O VENT: Brand: MEDLINE

## (undated) DEVICE — LABEL STERILIZATION W/PEN -- 50/CA

## (undated) DEVICE — ZIMMER® STERILE DISPOSABLE TOURNIQUET CUFF WITH PROTECTIVE SLEEVE AND PLC, DUAL PORT, SINGLE BLADDER, 34 IN. (86 CM)

## (undated) DEVICE — BIT DRL L125MM DIA2.7MM QUIK CPL FOR MINI FRAG SYS

## (undated) DEVICE — SYR 20ML LL STRL LF --

## (undated) DEVICE — NEEDLE HYPO 18GA L1.5IN PNK POLYPR HUB S STL THN WALL FILL

## (undated) DEVICE — INTENDED FOR TISSUE SEPARATION, AND OTHER PROCEDURES THAT REQUIRE A SHARP SURGICAL BLADE TO PUNCTURE OR CUT.: Brand: BARD-PARKER SAFETY BLADES SIZE 15, STERILE

## (undated) DEVICE — GAUZE,SPONGE,4"X4",16PLY,STRL,LF,10/TRAY: Brand: MEDLINE

## (undated) DEVICE — PENCIL ES L3M BTTN SWCH S STL HEX LOK BLDE ELECTRD HOLSTER

## (undated) DEVICE — SPONGE: LAP 18X18 W  200/CS: Brand: MEDICAL ACTION INDUSTRIES